# Patient Record
Sex: FEMALE | Race: WHITE
[De-identification: names, ages, dates, MRNs, and addresses within clinical notes are randomized per-mention and may not be internally consistent; named-entity substitution may affect disease eponyms.]

---

## 2017-10-12 ENCOUNTER — HOSPITAL ENCOUNTER (OUTPATIENT)
Dept: HOSPITAL 22 - LAB | Age: 22
End: 2017-10-12
Attending: NURSE PRACTITIONER
Payer: MEDICAID

## 2017-10-12 DIAGNOSIS — R53.83: Primary | ICD-10-CM

## 2017-10-12 DIAGNOSIS — E55.9: ICD-10-CM

## 2017-10-12 LAB
BASOPHILS # BLD AUTO: 1.9 K/MM3 (ref 0.7–4.5)
BUN: 15 MG/DL (ref 7–18)
EOSINOPHIL NFR BLD AUTO: 29.4 % (ref 10–50)
GFR SERPLBLD BASED ON 1.73 SQ M-ARVRAT: 91 ML/MIN (ref 59–?)
HCT VFR BLD CALC: 14.4 G/DL (ref 12.2–16.2)

## 2017-11-19 ENCOUNTER — HOSPITAL ENCOUNTER (EMERGENCY)
Dept: HOSPITAL 22 - UTC | Age: 22
Discharge: HOME | End: 2017-11-19
Payer: MEDICAID

## 2017-11-19 VITALS — HEIGHT: 62 IN | WEIGHT: 160 LBS | BODY MASS INDEX: 29.44 KG/M2

## 2017-11-19 VITALS — SYSTOLIC BLOOD PRESSURE: 131 MMHG | DIASTOLIC BLOOD PRESSURE: 96 MMHG

## 2017-11-19 DIAGNOSIS — J02.9: Primary | ICD-10-CM

## 2017-11-19 NOTE — EXTERNAL MEDICAL SUMMARY RPT
[facility]
 Created on: 2017
 
TROY JAMMIE
External Reference #: [id number]
: 89
Sex: Female
 
Demographics
 
 
 
 Address  [address]  [address]
 
 Home Phone  [phone]
 
 Preferred Language  English
 
 Marital Status  Unknown
 
 Christianity Affiliation  Unknown
 
 Race  Unknown
 
 Ethnic Group  Unknown
 
 
Author
 
 
 
 Author            ,            [facility]
 
 Organization  [facility]
 
 Address  Unknown
 
 Phone  [email or web address]
 
 
 
Care Team Providers
 
 
 
 Care Team Member Name  Role  Phone
 
 YAHIR CARDOZA, YAHIR CARDOZA   Unavailable  Unavailable
 
 LAZARO EMILY, LAZARO   Unavailable  Unavailable
 
 EMILY   
 
 UMESH MILIND,   Unavailable  Unavailable
 
 UMESH MILIND   
 
 MARK VISION,   Unavailable  Unavailable
 
 [facility] V[facility]   
 
 [facility]   Un[facility]  Unavailable
 
 [place]   
 
 [facility] [place]  
 
    
 
 [facility]   Unavailable  Unavailable
 
 [place]  
 
  [facility] [place]  
 
    
 
 ENEDELIA HANNAH,   Unavailable  Unavailable
 
 ENEDELIA HANNAH   
 
 [facility]   Unavailable  Unavailable
 
 [place], [facility]   Unavailable  Unavailable
 
 CENTER, [facility]   Unavailable  Unavailable
 
 INC, [facility]   
 
 [facility]   Unavailable  Unavailable
 
 HOSPITAL, [facility],   Unavailable  Unavailable
 
 [facility]   
 
 [facility],  Unavailable  Unavailable
 
  [facility]  
 
    
 
 [facility]   Unavailable  Unavailable
 
 IM[facility]ING [facility], [facility]   
 
 DYLLAN SOM,   Unavailable  Unavailable
 
 DYLLAN SOM   
 
 DYLLAN, SOM B,   Unavailable  Unavailable
 
 DYLLAN, SOM B   
 
 MUSIC RONDA, MUS[facility] RONDA   Unavailable  Unavailable
 
 [facility],   Unavailable  Unavailable
 
 [facility]   Unavailable  Unavailable
 
 [facility]   
 
 [facility]   
 
 DILLAN MEAD,   Unavailable  Unavailable
 
 [facility],   Unavailable  Unavailable
 
 [facility]   
 
 [facility]   Unavailable  Unavailable
 
 [address]   
 
 [facility] [address]   
 
 [facility]   Unavailable  Unavailable
 
 [facility], [facility]   
 
 [facility] ARNAV WANG III MESERET,   Unavailable  Unavailable
 
 KATHLEEN III MESERET   
 
                                            
 
Purpose
                      Continuity of Care Document - 2008 through 2017                                                                            
                     
 
Problems
                      
 
 
 Code         Diagnosis    DOS          Provider     Status     
 
                                                               
 
               
 
 J00          ACUTE   2017   [facility]              
 
              NASOPHARYNG               PHYSICIAN              
 
      ITIS COMMON          GROUP                   
 
   COLD                       
 
                  
 
      
 
         CARPAL   2017   [facility]              
 
              TUNNEL                PHYSICIANS              
 
      SYNDROME           GROUP                   
 
  BILATERAL                  
 
  UPPER LIMBS     
 
                
 
            
 
         LESION OF   2017   [facility]              
 
              ULNAR NERVE               PHYSICIANS              
 
       BILATERAL           GROUP                   
 
  UPPER LIMBS                 
 
                  
 
            
 
         ENCOUNTER   2017   [facility]              
 
              FOR                PHYSICIANS              
 
      SURVEILLANC          GROUP                   
 
  E                  
 
  CONTRACEPTI     
 
  VES UNS       
 
                
 
        
 
 J209         ACUTE   2017   [facility]              
 
              BRONCHITIS                PHYSICIANS,             
 
      UNSPECIFIED           PLL                   
 
                              
 
              
 
 R05          COUGH        2017   [facility]              
 
                                        PHYSICIANS,             
 
                  [facility]                   
 
                  
 
      
 
 R509         FEVER   2017   [place]              
 
              UNSPECIFIED               MEDICAL              
 
                           IMAGING ASS             
 
                          
 
            
 
 J028         ACUTE   2017   [facility]              
 
              PHARYNGITIS               PHYSICIANS              
 
       DUE TO           GROUP                   
 
  OTHER SPEC                  
 
  ORGANISMS       
 
                
 
          
 
 Z111         ENCOUNTER   2017   [facility]              
 
              SCREENING                PHYSICIAN              
 
      FOR           GROUP                   
 
  RESPIRATORY                 
 
        
 
  TUBERCULOSI   
 
  S             
 
             
 
 J029         ACUTE   2017   [facility]              
 
              [facility]               [facility]              
 
                 INC                     
 
  UNSPECIFIED                
 
                
 
            
 
 J020         STREPTOCOCC  2017   [facility]              
 
              AL                [facility]              
 
      PHARYNGITIS          INC                     
 
                             
 
            
 
 J069         ACUTE UPPER  2016   [facility]              
 
                              PHYSICIANS              
 
      RESPIRATORY          GROUP                   
 
   INFECTION                  
 
  UNSPECIFIED     
 
                
 
            
 
 [id number]       CELLULITIS   2016   [facility]              
 
              OF RIGHT                PHYSICIANS,             
 
      LOWER LIMB            PLLC                   
 
                              
 
             
 
         ENCOUNTER   2016   [facility]              
 
              SURVEILLANC               PHYSICIANS              
 
      E           GROUP                   
 
  INJECTABLE                  
 
  CONTRACEPTI     
 
  VE            
 
              
 
 [id number]       ACUTE   2016   [facility]              
 
              SUPPURATIVE               PHYSICIANS              
 
       OM W/O           GROUP                   
 
  RUPT EAR                  
 
  DRUM UNS      
 
  EAR           
 
               
 
 R102         PELVIC AND   2016   [facility]              
 
              PERINEAL                PHYSICIANS              
 
      PAIN                 GROUP                   
 
                              
 
      
 
         LEFT LOWER   2016   [facility]              
 
              QUADRANT                PHYSICIANS              
 
      PAIN                 GROUP                   
 
                              
 
      
 
         ENCOUNTER   2015   [facility]              
 
              FOR                PHYSICIANS              
 
      SURVEILLANC          GROUP                   
 
  E OTHER                  
 
  CONTRACEPTI     
 
  VES           
 
               
 
         HIGH RISK   2015   [place]              
 
              HETEROSEXUA               [facility]              
 
      L BEHAVIOR           INC                     
 
                             
 
           
 
 7804         DIZZINESS   2015   [facility]              
 
              AND                PHYSICIANS              
 
      GIDDINESS            GROUP                   
 
                              
 
            
 
 7850         UNSPECIFIED  2015   [facility]              
 
                              PHYSICIANS              
 
      TACHYCARDIA          GROUP                   
 
                              
 
              
 
 36198        CHEST PAIN   2015   [facility]              
 
              UNSPECIFIED               PHYSICIANS              
 
                           GROUP                   
 
                          
 
      
 
 81197        ACUTE   2015   [place]              
 
              SANGUINOUS                MEMORIAL              
 
      OTITIS           [facility]                
 
  MEDIA                       
 
                     
 
      
 
 5990         URINARY   2015   [facility]              
 
              TRACT                PHYSICIANS              
 
      INFECTION           GROUP                   
 
  SITE NOT                  
 
  SPECIFIED       
 
                
 
          
 
 6268         OTH D/O   2015   [facility]              
 
              MENSTRUATIO               PHYSICIANS              
 
      N&OTH ABN           GROUP                   
 
  BLEED FE                  
 
  GNT TRACT       
 
                
 
          
 
 20444        INFLUENZA   2015   [place]              
 
              IDENT [facility]                
 
  A RESP                  
 
  MANIFEST           
 
                
 
         
 
 4660         ACUTE   2015   [facility]              
 
              BRONCHITIS                PHYSICIANS              
 
                           GROUP                   
 
                         
 
      
 
 52674        ABDOMINAL   2014   [facility]              
 
              PAIN RIGHT                PHYSICIANS              
 
      LOWER           GROUP                   
 
  QUADRANT                    
 
                  
 
         
 
 V741         SCREENING   10-   [facility]              
 
              EXAMINATION               PHYSICIANS              
 
       FOR           GROUP                   
 
  PULMONARY                  
 
  TUBERCULOSI     
 
  S             
 
             
 
 23852        SHORTNESS   2014   [facility]              
 
              OF BREATH                 PHYSICIANS              
 
                           GROUP                   
 
                        
 
      
 
 5368         DYSPEPSIA&O  2014   [facility]              
 
              THER SPEC                [facility] DEPT              
 
    DISORDERS           [facility]                 
 
  FUNCTION                  
 
  STOMACH           
 
                
 
        
 
 09586        NAUSEA   2014   [facility]              
 
              ALONE                     [facility] DE[facility]              
 
                         [place]                 
 
                    
 
        
 
 63219        NAUSEA WITH  2014   [facility]              
 
               VOMITING                 [facility]              
 
                         [place]                 
 
                        
 
        
 
 61308        PAIN IN   2014   [facility]              
 
              JOINT, SITE               TH DEPT              
 
               [place]                 
 
  UNSPECIFIED                 
 
                    
 
            
 
 6253         DYSMENORRHE  2014   [facility]              
 
              A                         [facility] DE[facility]              
 
                      [place]                 
 
                  
 
        
 
 7840         HEADACHE     2014   [facility]              
 
                                        TH DEPT              
 
                  [place]                 
 
                  
 
        
 
 64461        FEVER   2014   [facility]              
 
              UNSPECIFIED               [facility] DEPT              
 
                         [place]                 
 
                          
 
        
 
 462          ACUTE   2014   [facility]              
 
              PHARYNGITIS               PHYSICIANS              
 
                           GROUP                   
 
                          
 
      
 
 46297        FEVER   2014   [facility]              
 
              PRESENTING                PHYSICIANS              
 
      CONDITIONS           GROUP                   
 
  CLASSIFIED                  
 
  ELSEWHERE       
 
                
 
          
 
         SURVEILLANC  2013   [facility]             
 
              E OT PREV                 HEALTH              
 
    PRSC           CENTER                  
 
  CONTRACEPT                  
 
  METHOD           
 
                
 
       
 
         OTHER   2013   [facility]             
 
              [facility]                 HEALTH              
 
    PROCREATIVE          CENTER                  
 
   MANAGEMENT                 
 
                   
 
            
 
 26645        DIARRHEA     2013   [facility]AH[facility] SONY               
 
                                                                
 
                                    
 
      
 
 V700         ROUTINE   2013   [facility]              
 
              GENERAL                [facility]                     
 
    MEDICAL                                  
 
  EXAM@[facility]     
 
   CARE FACL    
 
                
 
           
 
 1110         PITYRIASIS   2013   MUS[facility] RONDA               
 
              VERSICOLOR                                        
 
                                           
 
             
 
 V255         INSERTION   2012   [facility]              
 
              OF                                        
 
    IMPLANTABLE                             
 
   SUBDERMAL      
 
  CONTRACEPTI   
 
  VE            
 
              
 
 2662         OTHER   2012   [facility]             
 
              [facility]                 HEALTH              
 
    DEFICIENCIE          CENTER                  
 
  S                           
 
                
 
 17345        UNSPECIFIED  2012   [facility]             
 
               VIRAL                 [facility]                    
 
    INFECTION                                   
 
  IN CCE &      
 
  UNS SITE      
 
                
 
         
 
 490          BRONCHITIS   2011   [facility]             
 
              NOT                 [facility]                    
 
    SPECIFIED                                   
 
  AS ACUTE OR     
 
   CHRONIC      
 
                
 
         
 
 49741        ASTHMA   2011   [facility]             
 
              UNSPECIFIED                [facility]                    
 
     WITH                                   
 
  EXACERBATIO     
 
  N             
 
             
 
 09982        ACUTE   2011   [facility]             
 
              BRONCHOSPAS                [facility]                    
 
    M                                            
 
               
 
 67850        OTHER   2011   [facility]              
 
              DYSPNEA AND               MIL[facility]                     
 
                                      
 
  RESPIRATORY     
 
      
 
  ABNORMALITI   
 
  ES            
 
              
 
 4659         ACUTE URIS   10-   [facility]              
 
              OF                DON                     
 
    UNSPECIFIED                                 
 
   SITE           
 
                
 
      
 
 10223        OTHER   2011   [facility]              
 
              SPECIFIED                DON                     
 
    DISORDERS                                  
 
  OF URINARY      
 
  TRACT         
 
                
 
      
 
 5589         OTH&UNSPEC   2011   [facility]              
 
              NONINFECTIO               DON                     
 
    US                                  
 
  GASTROENTER     
 
  ITIS&COLITI   
 
  S             
 
             
 
         OT GENERAL  2011   [facility]             
 
                               HEALTH              
 
    CNSL&ADVICE          CENTER                  
 
   CONTRACEPT                 
 
   MANAGEMENT      
 
                
 
            
 
 3670         HYPERMETROP  2011   [facility]              
 
              IA                        VISION                  
 
                                               
 
         
 
 4778         ALLERGIC   2011   [facility]              
 
              RHINITIS                SOM                     
 
    DUE TO                                  
 
  OTHER      
 
  ALLERGEN      
 
                
 
         
 
 7862         COUGH        2011   [facility]              
 
                                        SOM                     
 
                                      
 
      
 
 70550        EXTRINSIC   2011   [facility]              
 
              ASTHMA,                SOM                     
 
    WITH                                  
 
  EXACERBATIO     
 
  N             
 
             
 
         GENERAL   10-   [facility]             
 
              CNSL                 HEALTH              
 
    INITIATION           CENTER                  
 
  OTH                  
 
  CONTRACEPT       
 
  MEASURES      
 
                
 
         
 
         PREGNANCY   10-   [facility]             
 
              EXAMINATION                HEALTH              
 
     OR TEST           CENTER                  
 
  NEGATIVE                  
 
  RESULT           
 
                
 
       
 
 7881         DYSURIA      2010   [facility]              
 
                                        DON                     
 
                                        
 
      
 
 3829         UNSPECIFIED  2010   [facility],              
 
               OTITIS                DON R                   
 
    MEDIA                                        
 
                    
 
      
 
 70637        OTHER   2010   [facility],              
 
              CHRONIC                SOM B                   
 
    ALLERGIC                                   
 
  CONJUNCTIVI       
 
  TIS           
 
               
 
 22637        ESOPHAGEAL   2010   [facility],              
 
              REFLUX                    SOM B                   
 
                                                 
 
           
 
 4779         ALLERGIC   2009   [facility],              
 
              RHINITIS                DON R                   
 
    CAUSE                                   
 
  UNSPECIFIED       
 
                
 
            
 
 6262         EXCESSIVE   2008   [facility],              
 
              OR FREQUENT               DON R                   
 
                                       
 
  MENSTRUATIO       
 
  N             
 
             
 
 7831         ABNORMAL   2008   [place]              
 
              WEIGHT GAIN               [facility]              
 
                         INC                     
 
                         
 
 V069         NEED PROPH   2008   [facility]              
 
              VACCINATION               HEALTH              
 
     W/UNSPEC           CENTRAL              
 
  COMB    BANK ACCT     
 
  VACCINE                   
 
                      
 
        
 
 0340         STREPTOCOCC  2008   [facility]             
 
              AL [facility]             
 
    THROAT                                       
 
                          
 
       
 
 7821         RASH AND   2008   [facility],              
 
              OTHER                DON R                   
 
    NONSPECIFIC                                  
 
   SKIN        
 
  ERUPTION      
 
                
 
         
 
 6828         CELLULITIS   2008   [facility],              
 
              AND ABSCESS               DON R                   
 
     OF OTHER                                   
 
  SPECIFIED        
 
  SITE          
 
                
 
 J20.9        ACUTE                                        
 
              BRONCHITIS,                                       
 
                                            
 
  UNSPECIFIED             
 
                
 
            
 
                                                                                
                                                                                
                                                                                
                                                                                
                                                                                
                                                                                
                                                                                
                                                                                
                                                                                
                                                                                
        
 
Medications
                      
 
 
 Na  ND  Rx  Da  Fi  Fi  Am  Da  Di  Ph  RX  Ph  St
 
 me  C   No  te  ll  ll  ou  ys  ag  ar   #  ys  at
 
         rm        s   nt      no  ma      ic  us
 
             Or  Da              si  cy      ia    
 
             de  te              s           n     
 
             re                                    
 
             d                                     
 
                                                   
 
                                                   
 
                                                   
 
                                                  
 
                                   
 
                           
 
                      
 
               
 
              
 
 VI  65      10  11      4.  28          00  [place]  Ac
 
 T   38      -1  -1      00              00  L-  ti
 
 D2  00      2-  7-      0               07  MA  ve
 
    73      20  20                      51  RT    
 
 1.  70      17  17                      50       
 
 25  6                                   83  PH    
 
                                            AR    
 
 MG                                          MA    
 
                                            CY    
 
 (5                                              
 
 0,                                    #5    
 
 00                                   91 
 
 0                                     
 
 UN                                    
 
 IT                             
 
 )                         
 
                           
 
                           
 
                  
 
                  
 
                  
 
               
 
               
 
               
 
              
 
 SV  81      10  11      30  30          00  [place]  Ac
 
    13      -1  -1      .0              00  L-  ti
 
 VI  10      2-  7-      00              08  MA  ve
 
 TA  31      20  20                      84  RT    
 
 MI  27      17  17                      15       
 
 N   1                                   82  PH    
 
 D3                                          AR    
 
                                            MA    
 
 5,                                          CY    
 
 00                                              
 
 0                                     #5    
 
 UN                                    91 
 
 IT                                    
 
                                      
 
 SF                             
 
 TG                        
 
 L                         
 
                           
 
                  
 
                  
 
                  
 
               
 
               
 
               
 
               
 
              
 
 NA  65      10  11      60  30          00  [place]  Ac
 
 ME  16      -1  -1      .0              00  L-  ti
 
 OX  20      2  7-      00              07  MA  ve
 
 EN  19      20  20                      51  RT    
 
    01      17  17                      50       
 
 50  1                                   84  PH    
 
 0                                           AR    
 
 MG                                          MA    
 
                                            CY    
 
 TA                                              
 
 BL                                    #5    
 
 ET                                    91 
 
                                       
 
                                       
 
                                
 
                           
 
                           
 
                           
 
                  
 
                  
 
                  
 
 [place]  60      10  11      5.  7           00  [place]  Ac
 
 LF  75      -1  -1      00              00  L-  ti
 
 AC  80      6-  7-      0               07  MA  ve
 
 ET  01      20  20                      51  RT    
 
 AM  80      17  17                      56       
 
 ID  5                                   63  PH    
 
 E                                           AR    
 
 10                                          MA    
 
 %                                           CY    
 
 EY                                             
 
 E                                     #5    
 
 DR                                   91 
 
 OP                                    
 
 S                                     
 
                                
 
                           
 
                           
 
                           
 
                  
 
                  
 
                  
 
               
 
              
 
 AZ  50      10  11      6.  5           00  [place]  Ac
 
 IT  11      -1  -1      00              00  L-  ti
 
 HR  10      6-  7-      0               07  MA  ve
 
 OM  78      20  20                      51  RT    
 
 YC  75      17  17                      56       
 
 IN  1                                   62  PH    
 
                                            AR    
 
 25                                          MA    
 
 0                                           CY    
 
 MG                                             
 
                                      #5    
 
 TA                                   91 
 
 BL                                    
 
 ET                                    
 
                                
 
                           
 
                           
 
                           
 
                  
 
                  
 
                  
 
               
 
               
 
 ME  00      09  11      20  10          00  [place]  Ac
 
 OM  60      -2  -0      0.              00  L-  ti
 
 ET  31      9-  3-      00              07  MA  ve
 
 HA  58      20  20      0               51  RT    
 
 ZI  65      17  17                      26       
 
 NE  8                                   76  PH    
 
 -D                                          AR    
 
 M                                           MA    
 
 SY                                          CY    
 
 RU                                              
 
 P                                     #5    
 
                                       91 
 
                                         
 
                                       
 
                                
 
                           
 
                           
 
                           
 
                  
 
                 
 
               
 
 LO  00      09  11      30  30          00  [place]  Ac
 
 RA  78      -2  -0      .0              00  L-  ti
 
 TA  15      9-  3-      00              08  MA  ve
 
 DI  07      20  20                      84  RT    
 
 NE  70      17  17                      13       
 
    1                                   82  PH    
 
 10                                          AR    
 
                                            MA    
 
 MG                                          CY    
 
                                                
 
 TA                                    #5    
 
 BL                                    91 
 
 ET                                    
 
                                       
 
                                
 
                           
 
                           
 
                           
 
                  
 
                  
 
                  
 
               
 
 ME  59      08  09      1.  90          00  CL  Ac
 
 DR  76      -1  -2      00              00  IN  ti
 
 OX  24      8-  2-      0               00  IC  ve
 
 YP  53      20  20                      40       
 
 RO  80      17  17                      75  PH    
 
 GE  2                                   72  AR    
 
 ST                                          MA    
 
 ER                                          CY    
 
 ON                                                
 
 E                                                
 
 15                                          
 
 0                                       
 
 MG                                    
 
 /M                                    
 
 L                              
 
                           
 
                           
 
                        
 
               
 
               
 
               
 
               
 
               
 
              
 
 BE  68      08  09      15  5           00  [place]  Ac
 
 NZ  38      -0  -0      .0              00  L-  ti
 
 ON  20      3-  8-      00              07  MA  ve
 
 AT  24      20  20                      50  RT    
 
 AT  70      17  17                      20       
 
 E   1                                   58  PH    
 
 10                                          AR    
 
 0                                           MA    
 
 MG                                          CY    
 
                                               
 
 CA                                    #5    
 
 PS                                    91 
 
 UL                                    
 
 E                                     
 
                                
 
                           
 
                           
 
                           
 
                  
 
                  
 
                  
 
               
 
              
 
 AZ  59      08  09      4.  4           00  [place]  Ac
 
 IT  76      -0  -0      00              00  L-  ti
 
 HR  23      3-  8-      0               07  MA  ve
 
 OM  06      20  20                      50  RT    
 
 YC  00      17  17                      20       
 
 IN  2                                   59  PH    
 
                                            AR    
 
 25                                          MA    
 
 0                                           CY    
 
 MG                                             
 
                                      #5    
 
 TA                                   91 
 
 BL                                    
 
 ET                                    
 
                                
 
                           
 
                           
 
                           
 
                  
 
                  
 
                  
 
               
 
               
 
 BR  60      07  08      40  7           00  [place]  Ac
 
 OM  43      -2  -2      0.              00  L-  ti
 
 PH  20      6-  5-      00              07  MA  ve
 
 EN  27      20  20      0               50  RT    
 
 IR  51      17  17                      08       
 
 -P  6                                   19  PH    
 
 SE                                          AR    
 
 UD                                          MA    
 
 OE                                          CY    
 
 PH                                             
 
 ED                                    #5    
 
 -D                                    91 
 
 M                                       
 
 SY                                    
 
 R                              
 
                           
 
                           
 
                           
 
                  
 
                  
 
                  
 
               
 
               
 
              
 
 NA  65      07  08      28  14          00  [place]  Ac
 
 ME  16      -1  -1      .0              00  L-  ti
 
 OX  20      8-  8-      00              07  MA  ve
 
 EN  19      20  20                      49  RT    
 
    01      17  17                      93       
 
 50  1                                   43  PH    
 
 0                                           AR    
 
 MG                                          MA    
 
                                            CY    
 
 TA                                              
 
 BL                                    #5    
 
 ET                                    91 
 
                                       
 
                                       
 
                                
 
                           
 
                           
 
                           
 
                  
 
                  
 
                  
 
 ME  59      05  06      1.  90          00  CL  
 
 DR  76      -2  -2      00              00  IN  ti
 
 OX  24      4-  3-      0               00  IC  ve
 
 YP  53      20  20                      40       
 
 RO  80      17  17                      75  PH    
 
 GE  2                                   72  AR    
 
 ST                                          MA    
 
 ER                                          CY    
 
 ON                                                
 
 E                                                
 
 15                                          
 
 0                                       
 
 MG                                    
 
 /M                                    
 
 L                              
 
                           
 
                           
 
                        
 
               
 
               
 
               
 
               
 
               
 
              
 
 BR  60      05  06      15  3           00  [facility]
 
 OM  43      -1  -1      0.              00  L-  ti
 
 PH  20      1-  6-      00              07  MA  ve
 
 EN  27      20  20      0               48  RT    
 
 IR  51      17  17                      74       
 
 -P  6                                   48  PH    
 
 SE                                          AR    
 
 UD                                          MA    
 
 OE                                          CY    
 
 PH                                             
 
 ED                                    #5    
 
 -D                                    91 
 
 M                                       
 
 SY                                    
 
 R                              
 
                           
 
                           
 
                           
 
                  
 
                  
 
                  
 
               
 
               
 
              
 
 VE  00      05  06      18  17          00  [facility]
 
 NT  17      -1  -1      .0              00  L-  ti
 
 OL  30      1-  6-      00              07  MA  ve
 
 IN  68      20  20                      48  RT    
 
    22      17  17                      74       
 
 HF  0                                   50  PH    
 
 A                                           AR    
 
 90                                          MA    
 
                                            CY    
 
 MC                                              
 
 G                                     #5    
 
 IN                                    91 
 
 [place]                                    
 
 LE                                    
 
 R                              
 
                           
 
                           
 
                           
 
                  
 
                  
 
                  
 
               
 
               
 
              
 
 CE  68      05  06      20  10          00  [facility]
 
 FD  18      -1  -1      .0              00  L-  ti
 
 IN  00      2-  6-      00              07  MA  ve
 
 IR  71      20  20                      48  RT    
 
    16      17  17                      76       
 
 30  0                                   54  PH    
 
 0                                           AR    
 
 MG                                          MA    
 
                                            CY    
 
 CA                                              
 
 PS                                    #5    
 
 UL                                    91 
 
 E                                     
 
                                       
 
                                
 
                           
 
                           
 
                           
 
                  
 
                  
 
                  
 
              
 
 AZ  59      05  06      6.  5           00  [facility]
 
 IT  76      -1  -1      00              00  L-  ti
 
 HR  23      6-  6-      0               07  MA  ve
 
 OM  06      20  20                      48  RT    
 
 YC  00      17  17                      81       
 
 IN  1                                   07  PH    
 
                                            AR    
 
 25                                          MA    
 
 0                                           CY    
 
 MG                                             
 
                                      #5    
 
 TA                                   91 
 
 BL                                    
 
 ET                                    
 
                                
 
                           
 
                           
 
                           
 
                  
 
                  
 
                  
 
               
 
               
 
 BE  68      05  06      15  5           00  [facility]
 
 NZ  38      -1  -1      .0              00  L-  ti
 
 ON  20      6-  6-      00              07  MA  ve
 
 AT  24      20  20                      48  RT    
 
 AT  70      17  17                      81       
 
 E   1                                   08  PH    
 
 10                                          AR    
 
 0                                           MA    
 
 MG                                          CY    
 
                                               
 
 CA                                    #5    
 
 PS                                    91 
 
 UL                                    
 
 E                                     
 
                                
 
                           
 
                           
 
                           
 
                  
 
                  
 
                  
 
               
 
              
 
 ME  59      03  03      1.  90          00  CL  
 
 DR  76      -0  -3      00              00  IN  ti
 
 OX  24      1-  1-      0               00  IC  ve
 
 YP  53      20  20                      40       
 
 RO  80      17  17                      75  PH    
 
 GE  2                                   72  AR    
 
 ST                                          MA    
 
 ER                                          CY    
 
 ON                                                
 
 E                                                
 
 15                                          
 
 0                                       
 
 MG                                    
 
 /M                                    
 
 L                              
 
                           
 
                           
 
                        
 
               
 
               
 
               
 
               
 
               
 
              
 
 BR  60      12  01      20  4           00  [facility]
 
 OM  43      -1  -1      0.              00  L-  ti
 
 PH  20      4-  3-      00              07  MA  ve
 
 EN  27      20  20      0               45  RT    
 
 IR  51      16  17                      85       
 
 -P  6                                   18  PH    
 
 SE                                          AR    
 
 UD                                          MA    
 
 OE                                          CY    
 
 PH                                             
 
 ED                                    #5    
 
 -D                                    91 
 
 M                                       
 
 SY                                    
 
 R                              
 
                           
 
                           
 
                           
 
                  
 
                  
 
                  
 
               
 
               
 
              
 
 ME  59      12  01      1.  90          00  CL  Ac
 
 DR  76      -0  -0      00              00  IN  ti
 
 OX  24      7-  9-      0               00  IC  ve
 
 YP  53      20  20                      40       
 
 RO  80      16  17                      75  PH    
 
 GE  2                                   72  AR    
 
 ST                                          MA    
 
 ER                                          CY    
 
 ON                                                
 
 E                                                
 
 15                                          
 
 0                                       
 
 MG                                    
 
 /M                                    
 
 L                              
 
                           
 
                           
 
                        
 
               
 
               
 
               
 
               
 
               
 
              
 
 FL  00      10  10  1   16  30      EA  24  ST  Ac
 
 UT  05      -1  -1      .0          ST  57  EP  ti
 
 IC  43      9-  9-      00          SI  37  HE  ve
 
 AS  27      20  20                  DE      NS    
 
 ON  09      11  11                              
 
 E   9                               PH      DO    
 
 ME                                  AR      N     
 
 OP                                  MA      R     
 
                                    CY            
 
 50                                             
 
                           OF            
 
 MC                                   
 
 G                       CY      
 
 SP                      NT      
 
 RA               HI      
 
 Y              AN      
 
                A      
 
                     
 
                  
 
                  
 
                  
 
                  
 
                  
 
                  
 
                 
 
              
 
 BR  60      10  10  1   12  3       EA  24  ST  Ac
 
 OM  43      -1  -1      0.          ST  57  EP  ti
 
 FE  20      9-  9-      00          SI  38  HE  ve
 
 D   83      20  20      0           DE      NS    
 
 DM  71      11  11                              
 
    6                               PH      DO    
 
 CO                                  AR      N     
 
 UG                                  MA      R     
 
 H                                   CY            
 
 SY                                            
 
 RU                         OF            
 
 P                                    
 
                           CY      
 
                         NT      
 
                  HI      
 
                AN      
 
                A      
 
                     
 
                  
 
                  
 
                 
 
               
 
               
 
               
 
               
 
              
 
 ME  00      06  06  0   16  3       EA  23  ST  Ac
 
 OM  78      -2  -2      .0          ST  09  EP  ti
 
 ET  11      7-  7      00          SI  79  HE  ve
 
 HA  83      20  20                  DE      NS    
 
 ZI  01      11  11                              
 
 NE  0                               PH      DO    
 
                                    AR      N     
 
 25                                  MA      R     
 
                                    CY            
 
 MG                                            
 
                           OF            
 
 TA                                   
 
 BL                      CY      
 
 ET                      NT      
 
                  HI      
 
                AN      
 
                A      
 
                     
 
                  
 
                  
 
                  
 
                  
 
                  
 
               
 
               
 
              
 
 [place]  50      06  06  0   12  6       EA  23  ST  Ac
 
 LF  38      -2  -2      0.          ST  09  EP  ti
 
 AM  30      7-  7-      00          SI  80  HE  ve
 
 ET  82      20  20      0           DE      NS    
 
 HO  31      11  11                              
 
 XA  6                               PH      DO    
 
 ZO                                  AR      N     
 
 LE                                  MA      R     
 
 -T                                  CY            
 
 MP                                            
 
                           OF            
 
 [place]                                   
 
 SP                        CY      
 
                         NT      
 
                  HI      
 
                AN      
 
                A      
 
                     
 
                  
 
                  
 
                  
 
                  
 
               
 
               
 
               
 
              
 
 AM  00      05  05  0   30  10      EA  22  ST  Ac
 
 OX  78      -2  -2      .0          ST  65  EP  ti
 
 IC  12      4-  4-      00          SI  59  HE  ve
 
 IL  02      20  20                  DE      NS    
 
 LI  00      11  11                              
 
 N   5                               PH      DO    
 
 25                                  AR      N     
 
 0                                   MA      R     
 
 MG                                  CY            
 
                                               
 
 CA                         OF            
 
 PS                                   
 
 UL                      CY      
 
 E                       NT      
 
                  HI      
 
                AN      
 
                A      
 
                     
 
                  
 
                  
 
                  
 
                  
 
                 
 
               
 
               
 
              
 
 ME  00      02  02  0   12  4       EA  21  ST  Ac
 
 OM  78      -2  -2      .0          ST  40  EP  ti
 
 ET  11      5-  5-      00          SI  90  HE  ve
 
 HA  83      20  20                  DE      NS    
 
 ZI  01      11  11                              
 
 NE  0                               PH      DO    
 
                                    AR      N     
 
 25                                  MA      R     
 
                                    CY            
 
 MG                                            
 
                           OF            
 
 TA                                   
 
 BL                      CY      
 
 ET                      NT      
 
                  HI      
 
                AN      
 
                A      
 
                     
 
                  
 
                  
 
                  
 
                  
 
                  
 
               
 
               
 
              
 
 LO  45      01  02  6   30  30      EA  20  MA  Ac
 
 RA  80      -1  -2      .0          ST  86  SH  ti
 
 TA  20      8-  4-      00          SI  08  BU  ve
 
 DI  65      20  20                  DE      RN    
 
 NE  08      11  11                              
 
    7                               PH      AM    
 
 10                                  AR      Y     
 
                                    MA      B     
 
 MG                                  CY            
 
                                               
 
 TA                         OF            
 
 BL                                   
 
 ET                      CY      
 
                         NT      
 
                  HI      
 
                AN      
 
                A      
 
                     
 
                  
 
                  
 
                  
 
                  
 
               
 
               
 
               
 
              
 
     00      01  02  6   30  30      EA  20  CO  Ac
 
     00        -2      .0          ST  86  MM  ti
 
     60      8-  4      00          SI  10  UN  ve
 
     11      20  20                  DE      IT    
 
     73      11  11                         Y     
 
     1                               PH      AL    
 
                                     AR      LE    
 
                                     MA      RG    
 
                                     CY      Y     
 
                                          &     
 
                            OF      AS    
 
                                   TH 
 
                         CY   MA 
 
                         NT     
 
                  HI   PS 
 
                AN   C  
 
                A       
 
                        
 
                  
 
                  
 
                  
 
                  
 
                  
 
                  
 
                 
 
              
 
     00      01  02  6   30  30      EA  20  MA  Ac
 
       -2      .0          ST  86  SH  ti
 
     60      8-  4      00          SI  10  BU  ve
 
     11      20  20                  DE      RN    
 
     73      11  11                              
 
     1                               PH      AM    
 
                                     AR      Y     
 
                                     MA      B     
 
                                     CY            
 
                                                
 
                            OF            
 
                                      
 
                         CY      
 
                         NT      
 
                  HI      
 
                AN      
 
                A      
 
                     
 
               
 
               
 
               
 
               
 
               
 
               
 
               
 
              
 
 AS  00      02  02  6   0.  15      EA  21  MA  Ac
 
 MA        24          ST  29  SH  ti
 
 NE  51      7-  7-      0           SI  45  BU  ve
 
 X   34      20  20                  DE      RN    
 
 TW  10      11  11                              
 
 IS  2                               PH      AM    
 
 TH                                  AR      Y     
 
 AL                                  MA      B     
 
 ER                                  CY            
 
                                               
 
 22                         OF            
 
 0                                   
 
 MC                      CY      
 
 G                       NT      
 
 #6               HI      
 
 0              AN      
 
                A      
 
                     
 
                  
 
                  
 
                  
 
                  
 
                  
 
                  
 
                 
 
              
 
 MA  51      02  02  0   59  1       EA  21  ST  Ac
 
 LA  67        -1      .0          ST  26  EP  ti
 
 TH  25      6          SI  49  HE  ve
 
 IO  27      20  20                  DE      NS    
 
 N   70      11  11                              
 
 0.  4                               PH      DO    
 
 5%                                  AR      N     
 
                                    MA      R     
 
 LO                                  CY            
 
 TI                                            
 
 ON                         OF            
 
                                      
 
                         CY      
 
                         NT      
 
                  HI      
 
                AN      
 
                A      
 
                     
 
                  
 
                  
 
               
 
               
 
               
 
               
 
               
 
              
 
 ME  37      01  01  6   30  30      EA  20  MA  Ac
 
 IL    -1      .0          ST  86  SH  ti
 
 OS  00                SI  05  BU  ve
 
 EC  45      20  20                  DE      RN    
 
    50      11  11                              
 
 OT  2                               PH      AM    
 
 C                                   AR      Y     
 
 20                                  MA      B     
 
 .6                                  CY            
 
                                               
 
 MG                         OF            
 
                                     
 
 TA                      CY      
 
 BL                      NT      
 
 ET               HI      
 
                AN      
 
                A      
 
                     
 
                  
 
                  
 
                  
 
                  
 
                  
 
                  
 
               
 
              
 
 [place]  00      01  01  3   30  4       EA  20  MA  Ac
 
 ST  16        -1      .0          ST  86  SH  ti
 
 AT  80      8  8          SI  06  BU  ve
 
 IN  00      20  20                  DE      RN    
 
    73      11  11                              
 
 10  0                               PH      AM    
 
 0,                                  AR      Y     
 
 00                                  MA      B     
 
 0                                   CY            
 
 UN                                            
 
 IT                         OF            
 
 S/                                   
 
 GM                      CY      
 
                        NT      
 
 OI               HI      
 
 NT             AN      
 
                A      
 
                     
 
                  
 
                  
 
                  
 
                  
 
                  
 
                  
 
                  
 
              
 
 NA  00      01  01  6   17  30      EA  20  MA  Ac
 
 SO  08      -  -1      .0          ST  86  SH  ti
 
 NE  51      8  8      00          SI  07  BU  ve
 
 X   28      20  20                  DE      RN    
 
 50  80      11  11                              
 
    1                               PH      AM    
 
 MC                                  AR      Y     
 
 G                                   MA      B     
 
 NA                                  CY            
 
 SA                                             
 
 L                          OF            
 
 SP                                   
 
 RA                      CY      
 
 Y                       NT      
 
                  HI      
 
                AN      
 
                A      
 
                     
 
                  
 
                  
 
                  
 
                  
 
                 
 
               
 
               
 
              
 
 LO  45      01  01  6   30  30      EA  20  MA  Ac
 
 RA  80      -1  -1      .0          ST  86  SH  ti
 
 TA  20      8-  8-      00          SI  08  BU  ve
 
 DI  65      20  20                  DE      RN    
 
 NE  08      11  11                              
 
    7                               PH      AM    
 
 10                                  AR      Y     
 
                                    MA      B     
 
 MG                                  CY            
 
                                               
 
 TA                         OF            
 
 BL                                   
 
 ET                      CY      
 
                         NT      
 
                  HI      
 
                AN      
 
                A      
 
                     
 
                  
 
                  
 
                  
 
                  
 
               
 
               
 
               
 
              
 
 VE  00      01  01  3   18  18      EA  20  MA  Ac
 
 NT  17      -1  -1      .0          ST  86  SH  ti
 
 OL  30      8-  8-      00          SI  09  BU  ve
 
 IN  68      20  20                  DE      RN    
 
    22      11  11                              
 
 HF  0                               PH      AM    
 
 A                                   AR      Y     
 
 90                                  MA      B     
 
                                    CY            
 
 MC                                             
 
 G                          OF            
 
 IN                                   
 
 HA                      CY      
 
 LE                      NT      
 
 R                HI      
 
                AN      
 
                A      
 
                     
 
                  
 
                  
 
                  
 
                  
 
                  
 
                 
 
               
 
              
 
     00      01  01  6   30  30      EA  20  CO  Ac
 
     00      -1  -1      .0          ST  86  MM  ti
 
     60      8-  8-      00          SI  10  UN  ve
 
     11      20  20                  DE      IT    
 
     73      11  11                         Y     
 
     1                               PH      AL    
 
                                     AR      LE    
 
                                     MA      RG    
 
                                     CY      Y     
 
                                          &     
 
                            OF      AS    
 
                                   TH 
 
                         CY   MA 
 
                         NT     
 
                  HI   PS 
 
                AN   C  
 
                A       
 
                        
 
                  
 
                  
 
                  
 
                  
 
                  
 
                  
 
                 
 
              
 
     00      01  01  6   30  30      EA  20  MA  Ac
 
     00      -1  -1      .0          ST  86  SH  ti
 
     60      8-  8-      00          SI  10  BU  ve
 
     11      20  20                  DE      RN    
 
     73      11  11                              
 
     1                               PH      AM    
 
                                     AR      Y     
 
                                     MA      B     
 
                                     CY            
 
                                                
 
                            OF            
 
                                      
 
                         CY      
 
                         NT      
 
                  HI      
 
                AN      
 
                A      
 
                     
 
               
 
               
 
               
 
               
 
               
 
               
 
               
 
              
 
 LO  45      11  11  0   30  30      EA  19  ST  Ac
 
 RA  80      -0  -0      .0          ST  89  EP  ti
 
 TA  20      9-  9-      00          SI  81  HE  ve
 
 DI  65      20  20                  DE      NS    
 
 NE  08      10  10                              
 
    7                               PH      DO    
 
 10                                  AR      N     
 
                                    MA      R     
 
 MG                                  CY            
 
                                               
 
 TA                         OF            
 
 BL                                   
 
 ET                      CY      
 
                         NT      
 
                  HI      
 
                AN      
 
                A      
 
                     
 
                  
 
                  
 
                  
 
                  
 
               
 
               
 
               
 
              
 
 AZ  00      11  11  0   6.  6       EA  19  ST  Ac
 
 IT  09      -0  -0      00          ST  89  EP  ti
 
 HR  37      9-  9-      0           SI  82  HE  ve
 
 OM  14      20  20                  DE      NS    
 
 YC  61      10  10                              
 
 IN  8                               PH      DO    
 
                                    AR      N     
 
 25                                  MA      R     
 
 0                                   CY            
 
 MG                                            
 
                           OF            
 
 TA                                  
 
 BL                      CY      
 
 ET                      NT      
 
                  HI      
 
                AN      
 
                A      
 
                     
 
                  
 
                  
 
                  
 
                  
 
                  
 
               
 
               
 
              
 
 [place]  53      09  09  0   14  7       EA  19  ST  Ac
 
 LF  74      -2  -2      .0          ST  29  EP  ti
 
 AM  60      5-  5-      00          SI  06  HE  ve
 
 ET  27      20  20                  DE      NS    
 
 HO  20      10  10                              
 
 XA  5                               PH      DO    
 
 ZO                                  AR      N     
 
 LE                                  MA      R     
 
 -T                                  CY            
 
 MP                                            
 
                           OF            
 
 DS                                   
 
                        CY      
 
 TA                      NT      
 
 BL               HI      
 
 ET             AN      
 
                A      
 
                     
 
                  
 
                  
 
                  
 
                  
 
                  
 
                  
 
                  
 
              
 
     00      09  09  0   6.  3       EA  19  RU  Ac
 
     59      -2  -2      00          ST  25  SH  ti
 
     10      2-  2-      0           SI  63     ve
 
     50      20  20                  DE      NE    
 
     20      10  10                         IL    
 
     1                               PH       C    
 
                                     AR            
 
                                     MA            
 
                                     CY            
 
                                               
 
                            OF            
 
                                     
 
                         CY      
 
                         NT      
 
                  HI      
 
                AN   
 
                A    
 
                     
 
               
 
               
 
               
 
               
 
               
 
               
 
               
 
              
 
 AZ  00      08  08  0   6.  6       EA  18  ST  Ac
 
 IT  09      -2  -2      00          ST  78  EP  ti
 
 HR  37      0-  0-      0           SI  86  HE  ve
 
 OM  14      20  20                  DE      NS    
 
 YC  61      10  10                              
 
 IN  8                               PH      DO    
 
                                    AR      N     
 
 25                                  MA      R     
 
 0                                   CY            
 
 MG                                            
 
                           OF            
 
 TA                                  
 
 BL                      CY      
 
 ET                      NT      
 
                  HI      
 
                AN      
 
                A      
 
                     
 
                  
 
                  
 
                  
 
                  
 
                  
 
               
 
               
 
              
 
 SE  45      03  07  3   12  15      EA  16  ST  Ac
 
 LE  80      -2  -2      0.          ST  91  EP  ti
 
 NI  20      4-  6-      00          SI  94  HE  ve
 
 UM  04      20  20      0           DE      NS    
 
    06      10  10                              
 
 [place]  4                               PH      DO    
 
 LF                                  AR      N     
 
 ID                                  MA      R     
 
 E                                   CY            
 
 2.                                             
 
 5%                         OF            
 
                                     
 
 LO                        CY      
 
 TI                      NT      
 
 ON               HI      
 
                AN      
 
                A      
 
                     
 
                  
 
                  
 
                  
 
                  
 
                  
 
                  
 
               
 
              
 
 NA  00      03  03  0   17  30      EA  16  MA  Ac
 
 SO  08      -2  -2      .0          ST  96  SH  ti
 
 NE  51      7-  7-      00          SI  78  BU  ve
 
 X   28      20  20                  DE      RN    
 
 50  80      10  10                              
 
    1                               PH      AM    
 
 MC                                  AR      Y     
 
 G                                   MA      B     
 
 NA                                  CY            
 
 SA                                             
 
 L                          OF            
 
 SP                                   
 
 RA                      CY      
 
 Y                       NT      
 
                  HI      
 
                AN      
 
                A      
 
                     
 
                  
 
                  
 
                  
 
                  
 
                 
 
               
 
               
 
              
 
 LO  45      03  03  0   30  30      EA  16  MA  Ac
 
 RA  80      -2  -2      .0          ST  96  SH  ti
 
 TA  20      7-  7-      00          SI  79  BU  ve
 
 DI  65      20  20                  DE      RN    
 
 NE  08      10  10                              
 
    7                               PH      AM    
 
 10                                  AR      Y     
 
                                    MA      B     
 
 MG                                  CY            
 
                                               
 
 TA                         OF            
 
 BL                                   
 
 ET                      CY      
 
                         NT      
 
                  HI      
 
                AN      
 
                A      
 
                     
 
                  
 
                  
 
                  
 
                  
 
               
 
               
 
               
 
              
 
     00      03  03  0   30  30      EA  16  CO  Ac
 
     00      -2  -2      .0          ST  96  MM  ti
 
     60      7-  7-      00          SI  80  UN  ve
 
     11      20  20                  DE      IT    
 
     73      10  10                         Y     
 
     1                               PH      AL    
 
                                     AR      LE    
 
                                     MA      RG    
 
                                     CY      Y     
 
                                          &     
 
                            OF      AS    
 
                                   TH 
 
                         CY   MA 
 
                         NT     
 
                  HI   PS 
 
                AN   C  
 
                A       
 
                        
 
                  
 
                  
 
                  
 
                  
 
                  
 
                  
 
                 
 
              
 
     00      03  03  0   30  30      EA  16  MA  Ac
 
     00      -2  -2      .0          ST  96  SH  ti
 
     60      7-  7-      00          SI  80  BU  ve
 
     11      20  20                  DE      RN    
 
     73      10  10                              
 
     1                               PH      AM    
 
                                     AR      Y     
 
                                     MA      B     
 
                                     CY            
 
                                                
 
                            OF            
 
                                      
 
                         CY      
 
                         NT      
 
                  HI      
 
                AN      
 
                A      
 
                     
 
               
 
               
 
               
 
               
 
               
 
               
 
               
 
              
 
 ME  00      03  03  0   21  6       EA  16  ST  Ac
 
 TH  78      -2  -2      .0          ST  91  EP  ti
 
 YL  15      4-  4-      00          SI  92  HE  ve
 
 ME  02      20  20                  DE      NS    
 
 ED  20      10  10                              
 
 NI  7                               PH      DO    
 
 SO                                  AR      N     
 
 LO                                  MA      R     
 
 NE                                  CY            
 
  4                                            
 
                           OF            
 
 MG                                   
 
                        CY      
 
 DO                      NT      
 
 SE               HI      
 
 PK             AN      
 
                A      
 
                     
 
                  
 
                  
 
                  
 
                  
 
                  
 
                  
 
                  
 
              
 
 AM  00      03  03  0   30  10      EA  16  ST  Ac
 
 OX  78      -2  -2      .0          ST  91  EP  ti
 
 IC  12      4-  4-      00          SI  93  HE  ve
 
 IL  02      20  20                  DE      NS    
 
 LI  00      10  10                              
 
 N   5                               PH      DO    
 
 25                                  AR      N     
 
 0                                   MA      R     
 
 MG                                  CY            
 
                                               
 
 CA                         OF            
 
 PS                                   
 
 UL                      CY      
 
 E                       NT      
 
                  HI      
 
                AN      
 
                A      
 
                     
 
                  
 
                  
 
                  
 
                  
 
                 
 
               
 
               
 
              
 
 SE  45      03  03  3   12  15      EA  16  ST  Ac
 
 LE  80      -2  -2      0.          ST  91  EP  ti
 
 NI  20      4-  4-      00          SI  94  HE  ve
 
 UM  04      20  20      0           DE      NS    
 
    06      10  10                              
 
 [place]  4                               PH      DO    
 
 LF                                  AR      N     
 
 ID                                  MA      R     
 
 E                                   CY            
 
 2.                                             
 
 5%                         OF            
 
                                     
 
 LO                        CY      
 
 TI                      NT      
 
 ON               HI      
 
                AN      
 
                A      
 
                     
 
                  
 
                  
 
                  
 
                  
 
                  
 
                  
 
               
 
              
 
 ME  68      02  02  00  12  2       WA  70  ST  Ac
 
 OM  38      -1  -2      .0          L-  58  EP  ti
 
 ET  20      4-  6-      00          MA  50  HE  ve
 
 HA  04      20  20                  RT  9   NS    
 
 ZI  10      10  10                              
 
 NE  1                               PH      DO    
 
                                    AR      N     
 
 25                                  MA      R     
 
                                    CY            
 
 MG                                             
 
                           #5            
 
 TA                         91         
 
 BL                                
 
 ET                              
 
                          
 
                        
 
                       
 
                     
 
                  
 
                  
 
                  
 
               
 
               
 
 AM  00      02  02  00  21  7       EA  16  ST  Ac
 
 OX  78      -1  -2      .0          ST  38  EP  ti
 
 IC  12      6-  6-      00          SI  63  HE  ve
 
 IL  61      20  20                  DE      NS    
 
 LI  30      10  10                              
 
 N   5                               PH      DO    
 
 50                                  AR      N     
 
 0                                   MA      R     
 
 MG                                  CY            
 
                                               
 
 CA                         OF            
 
 PS                         CY         
 
 UL                      NT      
 
 E                       HI      
 
                  AN      
 
                A       
 
                       
 
                     
 
                  
 
                  
 
                  
 
                  
 
                 
 
               
 
              
 
 LO  45      02  02  00  14  14      EA  16  ST  Ac
 
 RA  80      -1  -2      .0          ST  38  EP  ti
 
 TA  20      6-  6-      00          SI  64  HE  ve
 
 DI  65      20  20                  DE      NS    
 
 NE  08      10  10                              
 
    7                               PH      DO    
 
 10                                  AR      N     
 
                                    MA      R     
 
 MG                                  CY            
 
                                                
 
 TA                         OF            
 
 BL                         CY         
 
 ET                      NT      
 
                         HI      
 
                  AN      
 
                A       
 
                       
 
                     
 
                  
 
                  
 
                  
 
                  
 
               
 
               
 
              
 
 LO  45      01  12  03  30  30      EA  11  CO  Ac
 
 RA  80      -1  -0      .0          ST  08  MM  ti
 
 TA  20      3-  3-      00          SI  13  UN  ve
 
 DI  65      20  20                  DE      IT    
 
 NE  08      09  09                         Y     
 
    7                               PH      AL    
 
 10                                  AR      LE    
 
                                    MA      RG    
 
 MG                                  CY      Y     
 
                                          &     
 
 TA                         OF      AS    
 
 BL                         CY      TH 
 
 ET                      NT   MA 
 
                         HI     
 
                  AN   PS 
 
                A    C  
 
                        
 
                        
 
                     
 
                     
 
                     
 
                     
 
                  
 
                  
 
                
 
 NA  00      01  12  01  17  30      EA  11  CO  Ac
 
 SO  08      -1  -0      .0          ST  08  MM  ti
 
 NE  51      3-  3-      00          SI  11  UN  ve
 
 X   28      20  20                  DE      IT    
 
 50  80      09  09                         Y     
 
    1                               PH      AL    
 
                                   AR      LE    
 
 G                                   MA      RG    
 
 NA                                  CY      Y     
 
 SA                                        &     
 
 L                          OF      AS    
 
 SP                         CY      TH 
 
 RA                      NT   MA 
 
 Y                       HI     
 
                  AN   PS 
 
                A    C  
 
                        
 
                        
 
                     
 
                     
 
                     
 
                     
 
                    
 
                  
 
                
 
     00      01  12  04  30  30      EA  11  CO  Ac
 
     00      -1  -0      .0          ST  08  MM  ti
 
     60      3-  3-      00          SI  14  UN  ve
 
     11      20  20                  DE      IT    
 
     73      09  09                         Y     
 
     1                               PH      AL    
 
                                     AR      LE    
 
                                     MA      RG    
 
                                     CY      Y     
 
                                           &     
 
                            OF      AS    
 
                            CY      TH 
 
                         NT   MA 
 
                         HI     
 
                  AN   PS 
 
                A    C  
 
                        
 
                        
 
                  
 
                  
 
                  
 
                  
 
                  
 
                  
 
                
 
 LO  60      01  11  02  30  30      EA  11  CO  Ac
 
 RA  50      -1  -1      .0          ST  08  MM  ti
 
 TA  50      3-  9-      00          SI  13  UN  ve
 
 DI  14      20  20                  DE      IT    
 
 NE  70      09  09                         Y     
 
    8                               PH      AL    
 
 10                                  AR      LE    
 
                                    MA      RG    
 
 MG                                  CY      Y     
 
                                          &     
 
 TA                         OF      AS    
 
 BL                         CY      TH 
 
 ET                      NT   MA 
 
                         HI     
 
                  AN   PS 
 
                A    C  
 
                        
 
                        
 
                     
 
                     
 
                     
 
                     
 
                  
 
                  
 
                
 
 AZ  00      09  09  00  6.  5       EA  14  ST  Ac
 
 IT  09      -1  -2      00          ST  28  EP  ti
 
 HR  37      6-  4-      0           SI  03  HE  ve
 
 OM  14      20  20                  DE      NS    
 
 YC  61      09  09                              
 
 IN  8                               PH      DO    
 
                                    AR      N     
 
 25                                  MA      R     
 
 0                                   CY            
 
 MG                                             
 
                           OF            
 
 TA                        CY         
 
 BL                      NT      
 
 ET                      HI      
 
                  AN      
 
                A       
 
                       
 
                     
 
                  
 
                  
 
                  
 
                  
 
                  
 
               
 
              
 
 VE  00      07  08  00  18  18      EA  13  CO  Ac
 
 NT  17      -3  -1      .0          ST  68  MM  ti
 
 OL  30      1-  3-      00          SI  23  UN  ve
 
 IN  68      20  20                  DE      IT    
 
    22      09  09                         Y     
 
 HF  0                               PH      AL    
 
 A                                   AR      LE    
 
 90                                  MA      RG    
 
                                    CY      Y     
 
 MC                                        &     
 
 G                          OF      AS    
 
 IN                         CY      TH 
 
 FRANCIS                      NT   MA 
 
 LE                      HI     
 
 R                AN   PS 
 
                A    C  
 
                        
 
                        
 
                     
 
                     
 
                     
 
                     
 
                     
 
                    
 
                
 
 LO  60      01  06  01  30  30      EA  11  CO  Ac
 
 RA  50      -1  -1      .0          ST  08  MM  ti
 
 TA  50      3-  8-      00          SI  13  UN  ve
 
 DI  14      20  20                  DE      IT    
 
 NE  70      09  09                         Y     
 
    8                               PH      AL    
 
 10                                  AR      LE    
 
                                    MA      RG    
 
 MG                                  CY      Y     
 
                                          &     
 
 TA                         OF      AS    
 
 BL                         CY      TH 
 
 ET                      NT   MA 
 
                         HI     
 
                  AN   PS 
 
                A    C  
 
                        
 
                        
 
                     
 
                     
 
                     
 
                     
 
                  
 
                  
 
                
 
     00      01  06  03  30  30      EA  11  CO  Ac
 
     00      -1  -1      .0          ST  08  MM  ti
 
     60      3-  8-      00          SI  14  UN  ve
 
     11      20  20                  DE      IT    
 
     73      09  09                         Y     
 
     1                               PH      AL    
 
                                     AR      LE    
 
                                     MA      RG    
 
                                     CY      Y     
 
                                           &     
 
                            OF      AS    
 
                            CY      TH 
 
                         NT   MA 
 
                         HI     
 
                  AN   PS 
 
                A    C  
 
                        
 
                        
 
                  
 
                  
 
                  
 
                  
 
                  
 
                  
 
                
 
 ME  37      01  06  04  30  30      EA  11  CO  Ac
 
 IL  00      -1  -1      .0          ST  08  MM  ti
 
 OS  00      3-  8-      00          SI  12  UN  ve
 
 EC  45      20  20                  DE      IT    
 
    50      09  09                         Y     
 
 OT  2                               PH      AL    
 
 C                                   AR      LE    
 
 20                                  MA      RG    
 
 .6                                  CY      Y     
 
                                          &     
 
 MG                         OF      AS    
 
                           CY      TH 
 
 TA                      NT   MA 
 
 BL                      HI     
 
 ET               AN   PS 
 
                A    C  
 
                        
 
                        
 
                     
 
                     
 
                     
 
                     
 
                     
 
                     
 
                
 
     00      01  05  02  30  30      EA  11  CO  Ac
 
     00      -1  -0      .0          ST  08  MM  ti
 
     60      3-  7-      00          SI  14  UN  ve
 
     11      20  20                  DE      IT    
 
     73      09  09                         Y     
 
     1                               PH      AL    
 
                                     AR      LE    
 
                                     MA      RG    
 
                                     CY      Y     
 
                                           &     
 
                            OF      AS    
 
                            CY      TH 
 
                         NT   MA 
 
                         HI     
 
                  AN   PS 
 
                A    C  
 
                        
 
                        
 
                  
 
                  
 
                  
 
                  
 
                  
 
                  
 
                
 
 ME  37      01  05  03  30  30      EA  11  CO  Ac
 
 IL  00      -1  -0      .0          ST  08  MM  ti
 
 OS  00      3-  7-      00          SI  12  UN  ve
 
 EC  45      20  20                  DE      IT    
 
    50      09  09                         Y     
 
 OT  2                               PH      AL    
 
 C                                   AR      LE    
 
 20                                  MA      RG    
 
 .6                                  CY      Y     
 
                                          &     
 
 MG                         OF      AS    
 
                           CY      TH 
 
 TA                      NT   MA 
 
 BL                      HI     
 
 ET               AN   PS 
 
                A    C  
 
                        
 
                        
 
                     
 
                     
 
                     
 
                     
 
                     
 
                     
 
                
 
 LO  45      01  05  00  30  30      EA  11  CO  Ac
 
 RA  80      -1  -0      .0          ST  08  MM  ti
 
 TA  20      3-  7-      00          SI  13  UN  ve
 
 DI  65      20  20                  DE      IT    
 
 NE  08      09  09                         Y     
 
    7                               PH      AL    
 
 10                                  AR      LE    
 
                                    MA      RG    
 
 MG                                  CY      Y     
 
                                          &     
 
 TA                         OF      AS    
 
 BL                         CY      TH 
 
 ET                      NT   MA 
 
                         HI     
 
                  AN   PS 
 
                A    C  
 
                        
 
                        
 
                     
 
                     
 
                     
 
                     
 
                  
 
                  
 
                
 
     60      03  04  00  12  6       EA  12  ST  Ac
 
     25      -2  -0      0.          ST  01  EP  ti
 
     80      3-  9-      00          SI  46  HE  ve
 
     23      20  20      0           DE      NS    
 
     91      09  09                              
 
     6                               PH      DO    
 
                                     AR      N     
 
                                     MA      R     
 
                                     CY            
 
                                                
 
                            OF            
 
                            CY         
 
                           NT      
 
                         HI      
 
                  AN      
 
                A       
 
                       
 
                     
 
               
 
               
 
               
 
               
 
               
 
               
 
              
 
 AZ  00      03  04  00  6.  5       EA  12  ST  Ac
 
 IT  09      -2  -0      00          ST  01  EP  ti
 
 HR  37      3-  9-      0           SI  45  HE  ve
 
 OM  14      20  20                  DE      NS    
 
 YC  61      09  09                              
 
 IN  8                               PH      DO    
 
                                    AR      N     
 
 25                                  MA      R     
 
 0                                   CY            
 
 MG                                             
 
                           OF            
 
 TA                        CY         
 
 BL                      NT      
 
 ET                      HI      
 
                  AN      
 
                A       
 
                       
 
                     
 
                  
 
                  
 
                  
 
                  
 
                  
 
               
 
              
 
     00      01  03  01  30  30      EA  11  CO  Ac
 
     00      -1  -2      .0          ST  08  MM  ti
 
     60      3-  6-      00          SI  14  UN  ve
 
     11      20  20                  DE      IT    
 
     73      09  09                         Y     
 
     1                               PH      AL    
 
                                     AR      LE    
 
                                     MA      RG    
 
                                     CY      Y     
 
                                           &     
 
                            OF      AS    
 
                            CY      TH 
 
                         NT   MA 
 
                         HI     
 
                  AN   PS 
 
                A    C  
 
                        
 
                        
 
                  
 
                  
 
                  
 
                  
 
                  
 
                  
 
                
 
 ME  37      01  03  02  30  30      EA  11  CO  Ac
 
 IL  00      -1  -2      .0          ST  08  MM  ti
 
 OS  00      3-  6-      00          SI  12  UN  ve
 
 EC  45      20  20                  DE      IT    
 
    50      09  09                         Y     
 
 OT  2                               PH      AL    
 
 C                                   AR      LE    
 
 20                                  MA      RG    
 
 .6                                  CY      Y     
 
                                          &     
 
 MG                         OF      AS    
 
                           CY      TH 
 
 TA                      NT   MA 
 
 BL                      HI     
 
 ET               AN   PS 
 
                A    C  
 
                        
 
                        
 
                     
 
                     
 
                     
 
                     
 
                     
 
                     
 
                
 
 ME  37      01  02  01  30  30      EA  11  CO  Ac
 
 IL  00      -1  -2      .0          ST  08  MM  ti
 
 OS  00      3-  6-      00          SI  12  UN  ve
 
 EC  45      20  20                  DE      IT    
 
    50      09  09                         Y     
 
 OT  2                               PH      AL    
 
 C                                   AR      LE    
 
 20                                  MA      RG    
 
 .6                                  CY      Y     
 
                                          &     
 
 MG                         OF      AS    
 
                           CY      TH 
 
 TA                      NT   MA 
 
 BL                      HI     
 
 ET               AN   PS 
 
                A    C  
 
                        
 
                        
 
                     
 
                     
 
                     
 
                     
 
                     
 
                     
 
                
 
     00      01  02  00  30  30      EA  11  CO  Ac
 
     00      -1  -2      .0          ST  08  MM  ti
 
     60      3-  6-      00          SI  14  UN  ve
 
     11      20  20                  DE      IT    
 
     73      09  09                         Y     
 
     1                               PH      AL    
 
                                     AR      LE    
 
                                     MA      RG    
 
                                     CY      Y     
 
                                           &     
 
                            OF      AS    
 
                            CY      TH 
 
                         NT   MA 
 
                         HI     
 
                  AN   PS 
 
                A    C  
 
                        
 
                        
 
                  
 
                  
 
                  
 
                  
 
                  
 
                  
 
                
 
 ME  37      01  01  00  30  30      EA  11  CO  Ac
 
 IL  00      -1  -3      .0          ST  08  MM  ti
 
 OS  00      3-  0-      00          SI  12  UN  ve
 
 EC  45      20  20                  DE      IT    
 
    50      09  09                         Y     
 
 OT  2                               PH      AL    
 
 C                                   AR      LE    
 
 20                                  MA      RG    
 
 .6                                  CY      Y     
 
                                          &     
 
 MG                         OF      AS    
 
                           CY      TH 
 
 TA                      NT   MA 
 
 BL                      HI     
 
 ET               AN   PS 
 
                A    C  
 
                        
 
                        
 
                     
 
                     
 
                     
 
                     
 
                     
 
                     
 
                
 
 NA  00      01  01  00  17  30      EA  11  CO  Ac
 
 SO  08      -1  -3      .0          ST  08  MM  ti
 
 NE  51      3-  0-      00          SI  11  UN  ve
 
 X   28      20  20                  DE      IT    
 
 50  80      09  09                         Y     
 
    1                               PH      AL    
 
 MC                                  AR      LE    
 
 G                                   MA      RG    
 
 NA                                  CY      Y     
 
 SA                                        &     
 
 L                          OF      AS    
 
 SP                         CY      TH 
 
 RA                      NT   MA 
 
 Y                       HI     
 
                  AN   PS 
 
                A    C  
 
                        
 
                        
 
                     
 
                     
 
                     
 
                     
 
                    
 
                  
 
                
 
 AZ  00      01  01  00  6.  5       EA  11  ST  Ac
 
 IT  09      -2  -3      00          ST  18  EP  ti
 
 HR  37      1-  0-      0           SI  16  HE  ve
 
 OM  14      20  20                  DE      NS    
 
 YC  61      09  09                              
 
 IN  8                               PH      DO    
 
                                    AR      N     
 
 25                                  MA      R     
 
 0                                   CY            
 
 MG                                             
 
                           OF            
 
 TA                        CY         
 
 BL                      NT      
 
 ET                      HI      
 
                  AN      
 
                A       
 
                       
 
                     
 
                  
 
                  
 
                  
 
                  
 
                  
 
               
 
              
 
     00      07  01  05  30  30      EA  98  CO  Ac
 
     00      -0  -0      .0          ST  63  MM  ti
 
     60      7-  1-      00          SI  68  UN  ve
 
     11      20  20                  DE      IT    
 
     73      08  09                         Y     
 
     1                               PH      AL    
 
                                     AR      LE    
 
                                     MA      RG    
 
                                     CY      Y     
 
                                           &     
 
                            OF      AS    
 
                            CY      TH 
 
                         NT   MA 
 
                         HI     
 
                  AN   PS 
 
                A    C  
 
                        
 
                        
 
                  
 
                  
 
                  
 
                  
 
                  
 
                  
 
                
 
 LO  60      10  01  02  30  30      EA  99  CO  Ac
 
 RA  50      -2  -0      .0          ST  94  MM  ti
 
 TA  50      1-  1-      00          SI  12  UN  ve
 
 DI  14      20  20                  DE      IT    
 
 NE  70      08  09                         Y     
 
    1                               PH      AL    
 
 10                                  AR      LE    
 
                                    MA      RG    
 
 MG                                  CY      Y     
 
                                          &     
 
 TA                         OF      AS    
 
 BL                         CY      TH 
 
 ET                      NT   MA 
 
                         HI     
 
                  AN   PS 
 
                A    C  
 
                        
 
                        
 
                     
 
                     
 
                     
 
                     
 
                  
 
                  
 
                
 
     49      07  01  05  60  30      EA  98  CO  Ac
 
     88      -0  -0      .0          ST  63  MM  ti
 
     40      7-  1-      00          SI  67  UN  ve
 
     54      20  20                  DE      IT    
 
     41      08  09                         Y     
 
     0                               PH      AL    
 
                                     AR      LE    
 
                                     MA      RG    
 
                                     CY      Y     
 
                                           &     
 
                            OF      AS    
 
                            CY      TH 
 
                         NT   MA 
 
                         HI     
 
                  AN   PS 
 
                A    C  
 
                        
 
                        
 
                  
 
                  
 
                  
 
                  
 
                  
 
                  
 
                
 
 ME  60      12  12  00  12  3       EA  10  ST  Ac
 
 OM  43      -0  -1      0.          ST  57  EP  ti
 
 ET  20      8-  8-      00          SI  97  HE  ve
 
 HA  60      20  20      0           DE      NS    
 
 ZI  80      08  08                              
 
 NE  4                               PH      DO    
 
                                    AR      N     
 
 6.                                  MA      R     
 
 25                                  CY            
 
                                               
 
 MG                         OF            
 
 /5                         CY         
 
                          NT      
 
 ML                      HI      
 
                 AN      
 
 SY             A       
 
 RP                    
 
                     
 
                  
 
                  
 
                  
 
                  
 
                  
 
                  
 
                 
 
               
 
     00      07  12  04  30  30      EA  98  CO  Ac
 
     00      -0  -0      .0          ST  63  MM  ti
 
     60      7-  4-      00          SI  68  UN  ve
 
     11      20  20                  DE      IT    
 
     73      08  08                         Y     
 
     1                               PH      AL    
 
                                     AR      LE    
 
                                     MA      RG    
 
                                     CY      Y     
 
                                           &     
 
                            OF      AS    
 
                            CY      TH 
 
                         NT   MA 
 
                         HI     
 
                  AN   PS 
 
                A    C  
 
                        
 
                        
 
                  
 
                  
 
                  
 
                  
 
                  
 
                  
 
                
 
     49      07  12  04  60  30      EA  98  CO  Ac
 
     88      -0  -0      .0          ST  63  MM  ti
 
     40      7-  4-      00          SI  67  UN  ve
 
     54      20  20                  DE      IT    
 
     40      08  08                         Y     
 
     2                               PH      AL    
 
                                     AR      LE    
 
                                     MA      RG    
 
                                     CY      Y     
 
                                           &     
 
                            OF      AS    
 
                            CY      TH 
 
                         NT   MA 
 
                         HI     
 
                  AN   PS 
 
                A    C  
 
                        
 
                        
 
                  
 
                  
 
                  
 
                  
 
                  
 
                  
 
                
 
 LO  60      10  12  01  30  30      EA  99  CO  Ac
 
 RA  50      -2  -0      .0          ST  94  MM  ti
 
 TA  50      1-  4-      00          SI  12  UN  ve
 
 DI  14      20  20                  DE      IT    
 
 NE  70      08  08                         Y     
 
    1                               PH      AL    
 
 10                                  AR      LE    
 
                                    MA      RG    
 
 MG                                  CY      Y     
 
                                          &     
 
 TA                         OF      AS    
 
 BL                         CY      TH 
 
 ET                      NT   MA 
 
                         HI     
 
                  AN   PS 
 
                A    C  
 
                        
 
                        
 
                     
 
                     
 
                     
 
                     
 
                  
 
                  
 
                
 
 RA  00      07  11  03  60  30      EA  98  CO  Ac
 
 NI  17      -0  -0      .0          ST  63  MM  ti
 
 TI  24      7-  7-      00          SI  67  UN  ve
 
 DI  35      20  20                  DE      IT    
 
 NE  77      08  08                         Y     
 
    0                               PH      AL    
 
 15                                  AR      LE    
 
 0                                   MA      RG    
 
 MG                                  CY      Y     
 
                                          &     
 
 TA                         OF      AS    
 
 BL                         CY      TH 
 
 ET                      NT   MA 
 
                         HI     
 
                  AN   PS 
 
                A    C  
 
                        
 
                        
 
                     
 
                     
 
                     
 
                     
 
                  
 
                  
 
                
 
     00      07  11  03  30  30      EA  98  CO  Ac
 
     00      -0  -0      .0          ST  63  MM  ti
 
     60      7-  7-      00          SI  68  UN  ve
 
     11      20  20                  DE      IT    
 
     73      08  08                         Y     
 
     1                               PH      AL    
 
                                     AR      LE    
 
                                     MA      RG    
 
                                     CY      Y     
 
                                           &     
 
                            OF      AS    
 
                            CY      TH 
 
                         NT   MA 
 
                         HI     
 
                  AN   PS 
 
                A    C  
 
                        
 
                        
 
                  
 
                  
 
                  
 
                  
 
                  
 
                  
 
                
 
 LO  60      10  11  00  30  30      EA  99  CO  Ac
 
 RA  50      -2  -0      .0          ST  94  MM  ti
 
 TA  50      1-  7-      00          SI  12  UN  ve
 
 DI  14      20  20                  DE      IT    
 
 NE  70      08  08                         Y     
 
    1                               PH      AL    
 
 10                                  AR      LE    
 
                                    MA      RG    
 
 MG                                  CY      Y     
 
                                          &     
 
 TA                         OF      AS    
 
 BL                         CY      TH 
 
 ET                      NT   MA 
 
                         HI     
 
                  AN   PS 
 
                A    C  
 
                        
 
                        
 
                     
 
                     
 
                     
 
                     
 
                  
 
                  
 
                
 
 AM  00      10  10  00  21  7       EA  99  No  Ac
 
 OX  78      -0  -2      .0          ST  73  t   ti
 
 IC  12      4-  3-      00          SI  66  Av  ve
 
 IL  02      20  20                  DE      ai    
 
 LI  00      08  08                         la    
 
 N   5                               PH      bl    
 
 25                                  AR      e     
 
 0                                   MA            
 
 MG                                  CY            
 
                                               
 
 CA                         OF            
 
 PS                         CY         
 
 UL                      NT      
 
 E                       HI      
 
                  AN      
 
                A      
 
                     
 
                     
 
                  
 
                  
 
                  
 
                  
 
                 
 
               
 
              
 
     00      07  10  02  30  30      EA  98  No  Ac
 
     00      -0  -0      .0          ST  63  t   ti
 
     60      7-  9-      00          SI  68  Av  ve
 
     11      20  20                  DE      ai    
 
     73      08  08                         la    
 
     1                               PH      bl    
 
                                     AR      e     
 
                                     MA            
 
                                     CY            
 
                                                 
 
                            OF            
 
                            CY         
 
                         NT      
 
                         HI      
 
                  AN      
 
                A      
 
                     
 
                     
 
               
 
               
 
               
 
               
 
               
 
               
 
              
 
 LO  60      09  10  00  30  30      EA  99  No  Ac
 
 RA  50      -2  -0      .0          ST  55  t   ti
 
 TA  50      2-  9-      00          SI  80  Av  ve
 
 DI  14      20  20                  DE      ai    
 
 NE  70      08  08                         la    
 
    1                               PH      bl    
 
 10                                  AR      e     
 
                                    MA            
 
 MG                                  CY            
 
                                                
 
 TA                         OF            
 
 BL                         CY         
 
 ET                      NT      
 
                         HI      
 
                  AN      
 
                A      
 
                     
 
                     
 
                  
 
                  
 
                  
 
                  
 
               
 
               
 
              
 
     49      07  10  02  60  30      EA  98  No  Ac
 
     88      -0  -0      .0          ST  63  t   ti
 
     40      7-  9-      00          SI  67  Av  ve
 
     54      20  20                  DE      ai    
 
     40      08  08                         la    
 
     2                               PH      bl    
 
                                     AR      e     
 
                                     MA            
 
                                     CY            
 
                                                 
 
                            OF            
 
                            CY         
 
                         NT      
 
                         HI      
 
                  AN      
 
                A      
 
                     
 
                     
 
               
 
               
 
               
 
               
 
               
 
               
 
              
 
     00      07  08  01  30  30      EA  98  No  Ac
 
     00      -0  -2      .0          ST  63  t   ti
 
     60      7-  8-      00          SI  68  Av  ve
 
     11      20  20                  DE      ai    
 
     73      08  08                         la    
 
     1                               PH      bl    
 
                                     AR      e     
 
                                     MA            
 
                                     CY            
 
                                                 
 
                            OF            
 
                            CY         
 
                         NT      
 
                         HI      
 
                  AN      
 
                A      
 
                     
 
                     
 
               
 
               
 
               
 
               
 
               
 
               
 
              
 
 LO  60      01  08  06  30  30      EA  96  No  Ac
 
 RA  50      -2  -2      .0          ST  44  t   ti
 
 TA  50      1-  8-      00          SI  65  Av  ve
 
 DI  14      20  20                  DE      ai    
 
 NE  70      08  08                         la    
 
    1                               PH      bl    
 
 10                                  AR      e     
 
                                    MA            
 
 MG                                  CY            
 
                                                
 
 TA                         OF            
 
 BL                         CY         
 
 ET                      NT      
 
                         HI      
 
                  AN      
 
                A      
 
                     
 
                     
 
                  
 
                  
 
                  
 
                  
 
               
 
               
 
              
 
     49      07  08  01  60  30      EA  98  No  Ac
 
     88      -0  -2      .0          ST  63  t   ti
 
     40      7-  8-      00          SI  67  Av  ve
 
     54      20  20                  DE      ai    
 
     40      08  08                         la    
 
     2                               PH      bl    
 
                                     AR      e     
 
                                     MA            
 
                                     CY            
 
                                                 
 
                            OF            
 
                            CY         
 
                         NT      
 
                         HI      
 
                  AN      
 
                A      
 
                     
 
                     
 
               
 
               
 
               
 
               
 
               
 
               
 
              
 
 LO  60      01  08  05  30  30      EA  96  No  Ac
 
 RA  50      -2  -0      .0          ST  44  t   ti
 
 TA  50      1-  1-      00          SI  65  Av  ve
 
 DI  14      20  20                  DE      ai    
 
 NE  70      08  08                         la    
 
    1                               PH      bl    
 
 10                                  AR      e     
 
                                    MA            
 
 MG                                  CY            
 
                                                
 
 TA                         OF            
 
 BL                         CY         
 
 ET                      NT      
 
                         HI      
 
                  AN      
 
                A      
 
                     
 
                     
 
                  
 
                  
 
                  
 
                  
 
               
 
               
 
              
 
 SE  45      01  08  01  12  4       EA  96  No  Ac
 
 LE  80      -0  -0      0.          ST  28  t   ti
 
 NI  20      9-  1-      00          SI  45  Av  ve
 
 UM  04      20  20      0           DE      ai    
 
    06      08  08                         la    
 
 [place]  4                               PH      bl    
 
 LF                                  AR      e     
 
 ID                                  MA            
 
 E                                   CY            
 
 2.                                             
 
 5%                         OF            
 
                           CY         
 
 LO                        NT      
 
 TI                      HI      
 
 ON               AN      
 
                A      
 
                     
 
                     
 
                  
 
                  
 
                  
 
                  
 
                  
 
                  
 
              
 
     00      07  07  00  30  30      EA  98  No  Ac
 
     00      -0  -1      .0          ST  63  t   ti
 
     60      7-  7-      00          SI  68  Av  ve
 
     11      20  20                  DE      ai    
 
     73      08  08                         la    
 
     1                               PH      bl    
 
                                     AR      e     
 
                                     MA            
 
                                     CY            
 
                                                 
 
                            OF            
 
                            CY         
 
                         NT      
 
                         HI      
 
                  AN      
 
                A      
 
                     
 
                     
 
               
 
               
 
               
 
               
 
               
 
               
 
              
 
 NA  00      07  07  00  17  17      EA  98  No  Ac
 
 SO  08      -0  -1      .0          ST  63  t   ti
 
 NE  51      7-  7-      00          SI  66  Av  ve
 
 X   28      20  20                  DE      ai    
 
 50  80      08  08                         la    
 
    1                               PH      bl    
 
 MC                                  AR      e     
 
 G                                   MA            
 
 NA                                  CY            
 
 SA                                              
 
 L                          OF            
 
 SP                         CY         
 
 RA                      NT      
 
 Y                       HI      
 
                  AN      
 
                A      
 
                     
 
                     
 
                  
 
                  
 
                  
 
                  
 
                 
 
               
 
              
 
     49      07  07  00  60  30      EA  98  No  Ac
 
     88      -0  -1      .0          ST  63  t   ti
 
     40      7-  7-      00          SI  67  Av  ve
 
     54      20  20                  DE      ai    
 
     40      08  08                         la    
 
     2                               PH      bl    
 
                                     AR      e     
 
                                     MA            
 
                                     CY            
 
                                                 
 
                            OF            
 
                            CY         
 
                         NT      
 
                         HI      
 
                  AN      
 
                A      
 
                     
 
                     
 
               
 
               
 
               
 
               
 
               
 
               
 
              
 
 VE  00      07  07  00  18  18      EA  98  No  Ac
 
 NT  17      -0  -1      .0          ST  63  t   ti
 
 OL  30      7-  7-      00          SI  69  Av  ve
 
 IN  68      20  20                  DE      ai    
 
    22      08  08                         la    
 
 HF  0                               PH      bl    
 
 A                                   AR      e     
 
 90                                  MA            
 
                                    CY            
 
 MC                                              
 
 G                          OF            
 
 IN                         CY         
 
 [place]ANC[place]                      NT      
 
 LE                      HI      
 
 R                AN      
 
                A      
 
                     
 
                     
 
                  
 
                  
 
                  
 
                  
 
                  
 
                 
 
              
 
 NA  00      01  07  02  17  17      EA  96  No  Ac
 
 SO  08      -2  -0      .0          ST  44  t   ti
 
 NE  51      1-  3-      00          SI  61  Av  ve
 
 X   28      20  20                  DE      ai    
 
 50  80      08  08                         la    
 
    1                               PH      bl    
 
 MC                                  AR      e     
 
 G                                   MA            
 
 NA                                  CY            
 
 SA                                              
 
 L                          OF            
 
 SP                         CY         
 
 RA                      NT      
 
 Y                       HI      
 
                  AN      
 
                A      
 
                     
 
                     
 
                  
 
                  
 
                  
 
                  
 
                 
 
               
 
              
 
 LO  60      01  06  04  30  30      EA  96  No  Ac
 
 RA  50      -2  -1      .0          ST  44  t   ti
 
 TA  50      1-  2-      00          SI  65  Av  ve
 
 DI  14      20  20                  DE      ai    
 
 NE  70      08  08                         la    
 
    1                               PH      bl    
 
 10                                  AR      e     
 
                                    MA            
 
 MG                                  CY            
 
                                                
 
 TA                         OF            
 
 BL                         CY         
 
 ET                      NT      
 
                         HI      
 
                  AN      
 
                A      
 
                     
 
                     
 
                  
 
                  
 
                  
 
                  
 
               
 
               
 
              
 
     00      01  06  04  30  30      EA  96  No  Ac
 
     00      -2  -1      .0          ST  44  t   ti
 
     60      1-  2-      00          SI  63  Av  ve
 
     11      20  20                  DE      ai    
 
     73      08  08                         la    
 
     1                               PH      bl    
 
                                     AR      e     
 
                                     MA            
 
                                     CY            
 
                                                 
 
                            OF            
 
                            CY         
 
                         NT      
 
                         HI      
 
                  AN      
 
                A      
 
                     
 
                     
 
               
 
               
 
               
 
               
 
               
 
               
 
              
 
     49      01  06  04  60  30      EA  96  No  Ac
 
     88      -2  -1      .0          ST  44  t   ti
 
     40      1-  2-      00          SI  64  Av  ve
 
     54      20  20                  DE      ai    
 
     40      08  08                         la    
 
     2                               PH      bl    
 
                                     AR      e     
 
                                     MA            
 
                                     CY            
 
                                                 
 
                            OF            
 
                            CY         
 
                         NT      
 
                         HI      
 
                  AN      
 
                A      
 
                     
 
                     
 
               
 
               
 
               
 
               
 
               
 
               
 
              
 
     49      01  05  03  60  30      EA  96  No  Ac
 
     88      -2  -2      .0          ST  44  t   ti
 
     40      1-  2-      00          SI  64  Av  ve
 
     54      20  20                  DE      ai    
 
     40      08  08                         la    
 
     2                               PH      bl    
 
                                     AR      e     
 
                                     MA            
 
                                     CY            
 
                                                 
 
                            OF            
 
                            CY         
 
                         NT      
 
                         HI      
 
                  AN      
 
                A      
 
                     
 
                     
 
               
 
               
 
               
 
               
 
               
 
               
 
              
 
 LO  60      01  05  03  30  30      EA  96  No  Ac
 
 RA  50      -2  -2      .0          ST  44  t   ti
 
 TA  50      1-  2-      00          SI  65  Av  ve
 
 DI  14      20  20                  DE      ai    
 
 NE  70      08  08                         la    
 
    1                               PH      bl    
 
 10                                  AR      e     
 
                                    MA            
 
 MG                                  CY            
 
                                                
 
 TA                         OF            
 
 BL                         CY         
 
 ET                      NT      
 
                         HI      
 
                  AN      
 
                A      
 
                     
 
                     
 
                  
 
                  
 
                  
 
                  
 
               
 
               
 
              
 
     00      01  05  03  30  30      EA  96  No  Ac
 
     00      -2  -2      .0          ST  44  t   ti
 
     60      1-  2-      00          SI  63  Av  ve
 
     11      20  20                  DE      ai    
 
     73      08  08                         la    
 
     1                               PH      bl    
 
                                     AR      e     
 
                                     MA            
 
                                     CY            
 
                                                 
 
                            OF            
 
                            CY         
 
                         NT      
 
                         HI      
 
                  AN      
 
                A      
 
                     
 
                     
 
               
 
               
 
               
 
               
 
               
 
               
 
              
 
     49      01  04  02  60  30      EA  96  No  Ac
 
     88      -2  -1      .0          ST  44  t   ti
 
     40      1-  0-      00          SI  64  Av  ve
 
     54      20  20                  DE      ai    
 
     40      08  08                         la    
 
     2                               PH      bl    
 
                                     AR      e     
 
                                     MA            
 
                                     CY            
 
                                                 
 
                            OF            
 
                            CY         
 
                         NT      
 
                         HI      
 
                  AN      
 
                A      
 
                     
 
                     
 
               
 
               
 
               
 
               
 
               
 
               
 
              
 
 LO  60      01  04  02  30  30      EA  96  No  Ac
 
 RA  50      -2  -1      .0          ST  44  t   ti
 
 TA  50      1-  0-      00          SI  65  Av  ve
 
 DI  14      20  20                  DE      ai    
 
 NE  70      08  08                         la    
 
    1                               PH      bl    
 
 10                                  AR      e     
 
                                    MA            
 
 MG                                  CY            
 
                                                
 
 TA                         OF            
 
 BL                         CY         
 
 ET                      NT      
 
                         HI      
 
                  AN      
 
                A      
 
                     
 
                     
 
                  
 
                  
 
                  
 
                  
 
               
 
               
 
              
 
     00      01  04  02  30  30      EA  96  No  Ac
 
     00      -2  -1      .0          ST  44  t   ti
 
     60      1-  0-      00          SI  63  Av  ve
 
     11      20  20                  DE      ai    
 
     73      08  08                         la    
 
     1                               PH      bl    
 
                                     AR      e     
 
                                     MA            
 
                                     CY            
 
                                                 
 
                            OF            
 
                            CY         
 
                         NT      
 
                         HI      
 
                  AN      
 
                A      
 
                     
 
                     
 
               
 
               
 
               
 
               
 
               
 
               
 
              
 
     00      01  04  01  30  30      EA  96  No  Ac
 
     00      -2  -0      .0          ST  44  t   ti
 
     60      1-  7-      00          SI  63  Av  ve
 
     11      20  20                  DE      ai    
 
     73      08  08                         la    
 
     1                               PH      bl    
 
                                     AR      e     
 
                                     MA            
 
                                     CY            
 
                                                 
 
                            OF            
 
                            CY         
 
                         NT      
 
                         HI      
 
                  AN      
 
                A      
 
                     
 
                     
 
               
 
               
 
               
 
               
 
               
 
               
 
              
 
 NA  00      01  04  01  17  17      EA  96  No  Ac
 
 SO  08      -2  -0      .0          ST  44  t   ti
 
 NE  51      1-  7-      00          SI  61  Av  ve
 
 X   28      20  20                  DE      ai    
 
 50  80      08  08                         la    
 
    1                               PH      bl    
 
 MC                                  AR      e     
 
 G                                   MA            
 
 NA                                  CY            
 
 SA                                              
 
 L                          OF            
 
 SP                         CY         
 
 RA                      NT      
 
 Y                       HI      
 
                  AN      
 
                A      
 
                     
 
                     
 
                  
 
                  
 
                  
 
                  
 
                 
 
               
 
              
 
 LO  60      01  04  01  30  30      EA  96  No  Ac
 
 RA  50      -2  -0      .0          ST  44  t   ti
 
 TA  50      1-  7-      00          SI  65  Av  ve
 
 DI  14      20  20                  DE      ai    
 
 NE  70      08  08                         la    
 
    1                               PH      bl    
 
 10                                  AR      e     
 
                                    MA            
 
 MG                                  CY            
 
                                                
 
 TA                         OF            
 
 BL                         CY         
 
 ET                      NT      
 
                         HI      
 
                  AN      
 
                A      
 
                     
 
                     
 
                  
 
                  
 
                  
 
                  
 
               
 
               
 
              
 
     49      01  04  01  60  30      EA  96  No  Ac
 
     88      -2  -0      .0          ST  44  t   ti
 
     40      1-  7-      00          SI  64  Av  ve
 
     54      20  20                  DE      ai    
 
     40      08  08                         la    
 
     2                               PH      bl    
 
                                     AR      e     
 
                                     MA            
 
                                     CY            
 
                                                 
 
                            OF            
 
                            CY         
 
                         NT      
 
                         HI      
 
                  AN      
 
                A      
 
                     
 
                     
 
               
 
               
 
               
 
               
 
               
 
               
 
              
 
 LO  60      01  03  00  30  30      EA  96  No  Ac
 
 RA  50      -2  -2      .0          ST  44  t   ti
 
 TA  50      1-  6-      00          SI  65  Av  ve
 
 DI  14      20  20                  DE      ai    
 
 NE  70      08  08                         la    
 
    1                               PH      bl    
 
 10                                  AR      e     
 
                                    MA            
 
 MG                                  CY            
 
                                                
 
 TA                         OF            
 
 BL                         CY         
 
 ET                      NT      
 
                         HI      
 
                  AN      
 
                A      
 
                     
 
                     
 
                  
 
                  
 
                  
 
                  
 
               
 
               
 
              
 
     49      01  03  00  60  30      EA  96  No  Ac
 
     88      -2  -2      .0          ST  44  t   ti
 
     40      1  6      00          SI  64  Av  ve
 
     54      20  20                  DE      ai    
 
     40      08  08                         la    
 
     2                               PH      bl    
 
                                     AR      e     
 
                                     MA            
 
                                     CY            
 
                                                 
 
                            OF            
 
                            CY         
 
                         NT      
 
                         HI      
 
                  AN      
 
                A      
 
                     
 
                     
 
               
 
               
 
               
 
               
 
               
 
               
 
              
 
 AM  00      01  03  00  30  10      EA  96  No  Ac
 
 OX  78      -3  -2      .0          ST  58  t   ti
 
 IC  12        6          SI  70  Av  ve
 
 IL  61      20  20                  DE      ai    
 
 LI  30      08  08                         la    
 
 N   5                               PH      bl    
 
 50                                  AR      e     
 
 0                                   MA            
 
 MG                                  CY            
 
                                                
 
 CA                         OF            
 
 PS                         CY         
 
 UL                      NT      
 
 E                       HI      
 
                  AN      
 
                A      
 
                     
 
                     
 
                  
 
                  
 
                  
 
                  
 
                 
 
               
 
              
 
     00      01  03  00  30  30      EA  96  No  Ac
 
     00      -2  -2      .0          ST  44  t   ti
 
     60      1  6      00          SI  63  Av  ve
 
     11      20  20                  DE      ai    
 
     73      08  08                         la    
 
     1                               PH      bl    
 
                                     AR      e     
 
                                     MA            
 
                                     CY            
 
                                                 
 
                            OF            
 
                            CY         
 
                         NT      
 
                         HI      
 
                  AN      
 
                A      
 
                     
 
                     
 
               
 
               
 
               
 
               
 
               
 
               
 
              
 
 NA  00      01  03  00  17  17      EA  96  No  Ac
 
 SO  08      -2  -2      .0          ST  44  t   ti
 
 NE  51                SI  61  Av  ve
 
 X   28      20  20                  DE      ai    
 
 50  80      08  08                         la    
 
    1                               PH      bl    
 
 MC                                  AR      e     
 
 G                                   MA            
 
 NA                                  CY            
 
 SA                                              
 
 L                          OF            
 
 SP                         CY         
 
 RA                      NT      
 
 Y                       HI      
 
                  AN      
 
                A      
 
                     
 
                     
 
                  
 
                  
 
                  
 
                  
 
                 
 
               
 
              
 
 VE  00        03  00  18  18      EA  96  No  Ac
 
 NT  17      -2  -2      .0          ST  44  t   ti
 
 OL  30                SI  62  Av  ve
 
 IN  68      20  20                  DE      ai    
 
    22      08  08                         la    
 
 HF  0                               PH      bl    
 
 A                                   AR      e     
 
 90                                  MA            
 
                                    CY            
 
 MC                                              
 
 G                          OF            
 
 IN                         CY         
 
 FRANCIS                      NT      
 
 LE                      HI      
 
 R                AN      
 
                A      
 
                     
 
                     
 
                  
 
                  
 
                  
 
                  
 
                  
 
                 
 
              
 
 SE  45      01  03  00  12  4       EA  96  No  Ac
 
 LE  80      -0  -2      0.          ST  28  t   ti
 
 NI  20      9  4-      00          SI  45  Av  ve
 
 UM  04      20  20      0           DE      ai    
 
    06      08  08                         la    
 
 [place]  4                               PH      bl    
 
 LF                                  AR      e     
 
 ID                                  MA            
 
 E                                   CY            
 
 2.                                             
 
 5%                         OF            
 
                           CY         
 
 LO                        NT      
 
 TI                      HI      
 
 ON               AN      
 
                A      
 
                     
 
                     
 
                  
 
                  
 
                  
 
                  
 
                  
 
                  
 
              
 
                                                                                
                                                                                
                                                                                
                                                                                
                                                                                
                                                                                
                                                                                
                                                                                
                                                                                
                                                                                
                                                                                
                                                                                
                                                                                
                                                                                
                                                                                
                                                                         
 
Results
                      
 
 
 Labs                
 
 
 
 
 Lab   Lab   Date    Result  Refere  Interp  Status  Commen
 
 Order   Detail                  nces   retati          t     
 
                                 Range   on                    
 
                                                            
 
                                  
 
                
 
 
 
 
 Hemoglobin A1c measurement (10- 14:15)
 
                 
 
 
 
 
          Hemoglo  10-12-2  5.2 %    0.0-7.0           complet
 
          bin A1c  017                              ed     
 
                 14:15                                      
 
                                           
 
                           
 
           
 
 
 
 
                      Comment:               < 6%   NON-DIABETIC 
 
         LEVEL                
 
                      Comment:               < 7%   CONTROLLED 
 
         DIABETIC LEVEL       
 
            
 
                      Comment:               > 8%   POORLY 
 
         CONTROLLED DIABETIC 
 
   LEVEL                
 
 
 
 
 Serum or plasma thyroid stimulating horm (10- 14:15)
 
                 
 
 
 
 
          Serum   10-12-2  = 2.03   0.358-3           complet
 
          or   017   uIU/ml   .740              ed     
 
        plasma   14:15                                      
 
  thyroid                                          
 
                              
 
  stimula          
 
  ting      
 
  horm        
 
              
 
              
 
        
 
 
 
 
 Lipid profile (10- 14:15)
 
                 
 
 
 
 
          Serum   10-12-2  = 11.8   0-40              complet
 
          or   017                              ed     
 
        plasma   14:15                                      
 
  cholest                                
 
  ilda in                     
 
   VLDL           
 
  brandan        
 
              
 
              
 
        
 
          Serum   10-12-2  = 59               complet
 
          or   017   mg/dL                      ed     
 
        plasma   14:15                                      
 
  triglyc                                      
 
  eride                         
 
  measure          
 
  ment        
 
              
 
              
 
        
 
          Serum   10-12-2  = 78.2   0-130             complet
 
          or   017   mg/dL                      ed     
 
        plasma   14:15                                      
 
  cholest                                     
 
  ilda in                        
 
   LDL           
 
  measu       
 
              
 
              
 
         
 
          Serum   10-12-2  = 46.0   40-60             complet
 
          or   017   MG/DL                      ed     
 
        plasma   14:15                                      
 
  cholest                                     
 
  ilda in                        
 
   HDL           
 
  measu       
 
              
 
              
 
         
 
          Total   10-12-2  = 136   < 200             complet
 
          cholest  017   mg/dL                      ed     
 
        ilda   14:15                                      
 
  measure                                     
 
  ment                           
 
                   
 
              
 
        
 
 
 
 
 Serum or plasma free thyroxine (T4) brandan (10- 14:15)
 
                 
 
 
 
 
          Serum   10-12-2  = 1.17   0.76-1.           complet
 
          or   017   ng/dL    46                ed     
 
        plasma   14:15                                      
 
  free                                          
 
  thyroxi                        
 
  ne (T4)          
 
   brandan       
 
              
 
              
 
         
 
 
 
 
 Comprehensive metabolic panel (10- 14:15)
 
                 
 
 
 
 
          Protein  10-12-2  = 7.6   6.4-8.2           complet
 
           total   017   gm/dL                      ed     
 
        ser/gabrielle  14:15                                      
 
  s                                             
 
                                 
 
                 
 
          ALT   10-12-2  = 17   12-78             complet
 
          (SGPT)   017   U/L                        ed     
 
        ser/gabrielle  14:15                                      
 
  s                                           
 
                              
 
                 
 
          Serum   10-12-2  = 13   15-37             complet
 
          or   017   U/L                        ed     
 
        plasma   14:15                                      
 
  asparta                                     
 
  te                      
 
  aminotr          
 
  ansfera     
 
              
 
              
 
           
 
          Serum   10-12-2  = 142   136-145           complet
 
          sodium   017   mmoL/L                     ed     
 
        measure  14:15                                      
 
  ment                                          
 
                                  
 
                   
 
        
 
          Serum   10-12-2  = 4.0   3.5-5.1           complet
 
          potassi  017   mmoL/L                     ed     
 
        um   14:15                                      
 
  measure                                       
 
  ment                            
 
                   
 
              
 
        
 
          Serum   10-12-2  = 96               complet
 
          or   017   mg/dL                      ed     
 
        plasma   14:15                                      
 
  glucose                                      
 
                           
 
  measure          
 
  ment      
 
  (mas        
 
              
 
              
 
        
 
          Serum   10-12-2  = 3.2   1.3-3.2           complet
 
          globuli  017   gm/dL                      ed     
 
        n   14:15                                      
 
  measure                                       
 
  ment                         
 
  (mass/v          
 
  olume)      
 
              
 
              
 
          
 
          Estimat  10-12-2  = 91   59-               complet
 
          ed   017   ML/MIN                     ed     
 
        glomeru  14:15                                      
 
  lar                                   
 
  filtrat                         
 
  ion           
 
  rate      
 
  (GF         
 
              
 
              
 
 
 
 
                      Comment:               REFERENCE RANGE: >60 
 
         ML/MIN/1.73 SQUARE 
 
   METERS               
 
    
 
                      Comment:               If this patient is 
 
         -American, 
 
   then multiply the    
 
               
 
                      Comment:               result by 1.210.     
 
                    
 
 
 
 
          Serum   10-12-2  = 0.8   0.55-1.           complet
 
          or   017   mg/dL    02                ed     
 
        plasma   14:15                                      
 
  creatin                                         
 
  ine                         
 
  measure          
 
  ment (      
 
              
 
              
 
          
 
          Carbon   10-12-2  = 30   21.0-32           complet
 
          dioxide  017   mmoL/L   .0                ed     
 
           14:15                                      
 
  measure                                         
 
  ment                            
 
                   
 
              
 
        
 
          Serum   10-12-2  = 105               complet
 
          or   017   mmoL/L                     ed     
 
        plasma   14:15                                      
 
  chlorid                                      
 
  e                          
 
  measure          
 
  ment      
 
  (mo         
 
              
 
              
 
          Serum   10-12-2  = 9.5   8.5-10.           complet
 
          or   017   mg/dL    1                 ed     
 
        plasma   14:15                                      
 
  calcium                                        
 
                           
 
  measure          
 
  ment      
 
  (mas        
 
              
 
              
 
        
 
          Serum   10-12-2  = 15   7-18              complet
 
          or   017   mg/dL                      ed     
 
        plasma   14:15                                      
 
  urea                                     
 
  nitroge                        
 
  n           
 
  measure     
 
  men         
 
              
 
              
 
          Serum   10-12-2  = 0.2   0.2-1.0           complet
 
          or   017   mg/dL                      ed     
 
        plasma   14:15                                      
 
  total                                        
 
  bilirub                        
 
  in           
 
  measure     
 
  m           
 
              
 
            
 
          Serum   10-12-2  = 65               complet
 
          or   017   U/L                        ed     
 
        plasma   14:15                                      
 
  alkalin                                      
 
  e                      
 
  phospha          
 
  tase      
 
  yajaira         
 
              
 
              
 
          Serum   10-12-2  = 4.4   3.4-5.0           complet
 
          or   017   gm/dL                      ed     
 
        plasma   14:15                                      
 
  albumin                                       
 
                           
 
  measure          
 
  ment      
 
  (mas        
 
              
 
              
 
        
 
          Serum   10-12-2  = 1.4    1.1-1.8           complet
 
          or   017                              ed     
 
        plasma   14:15                                      
 
  albumin                                  
 
  /globul                     
 
  in mass          
 
   ra         
 
              
 
              
 
 
 
 
 CBC w auto diff (10- 14:15)
 
                 
 
 
 
 
          Automat  10-12-2  = 7.1   7.4-10.           complet
 
          ed   017   fl       4                 ed     
 
        blood   14:15                                      
 
  platele                                       
 
  t mean                      
 
  volume           
 
  yajaira         
 
              
 
              
 
          Mono %   10-12-2  = 6.9 %  1.7-9.3           complet
 
                   017                              ed     
 
                 14:15                                      
 
                                         
 
                      
 
           
 
          Absolut  10-12-2  = 0.4   0.1-1.0           complet
 
          e   017   K/mm3                      ed     
 
        monocyt  14:15                                      
 
  e count                                       
 
                                 
 
                   
 
           
 
          Automat  10-12-2  = 88.2   82.2-97           complet
 
          ed   017   fl       .8                ed     
 
        erythro  14:15                                      
 
  cyte                                         
 
  mean                      
 
  corpusc          
 
  ular v      
 
              
 
              
 
          
 
          Automat  10-12-2  = 34.5   31.8-35           complet
 
          ed   017   g/dl     .4                ed     
 
        erythro  14:15                                      
 
  cyte                                          
 
  mean                        
 
  corpusc          
 
  ular h      
 
              
 
              
 
          
 
          Mean   10-12-2  = 30.4   27-31.2           complet
 
          corpusc  017   pg                         ed     
 
        ular   14:15                                      
 
  hemoglo                                      
 
  bin                      
 
  ([facility])           
 
  determ      
 
              
 
              
 
          
 
          Lymphoc  10-12-2  = 29.4   10-50.0           complet
 
          yte   017   %                          ed     
 
        count,   14:15                                      
 
  blood,                                      
 
  automat                     
 
  ed               
 
              
 
             
 
          Absolut  10-12-2  = 1.9   0.7-4.5           complet
 
          e   017   K/mm3                      ed     
 
        lymphoc  14:15                                      
 
  yte                                        
 
  count                          
 
                   
 
              
 
         
 
          Blood   10-12-2  = 14.4   12.2-16           complet
 
          hemoglo  017   g/dL     .2                ed     
 
        bin   14:15                                      
 
  measure                                         
 
  ment                        
 
  (mass/v          
 
  olum        
 
              
 
              
 
        
 
          Blood   10-12-2  = 41.8   37.0-47           complet
 
          hematoc  017   %        .0                ed     
 
        rit   14:15                                      
 
  (volume                                       
 
                        
 
  fractio          
 
  n)          
 
              
 
             
 
          Granulo  10-12-2  = 62.3   37.0-80           complet
 
          cyte   017   %        .0                ed     
 
        percent  14:15                                      
 
  age                                           
 
                              
 
                   
 
          Blood   10-12-2  = 3.9   1.8-7.8           complet
 
          granulo  017   K/mm3                      ed     
 
        cytes   14:15                                      
 
  automat                                       
 
  ed                         
 
  count           
 
  (numb       
 
              
 
              
 
         
 
          Automat  10-12-2  = 0.8 %  0.1-12.           complet
 
          ed   017            0                 ed     
 
        blood   14:15                                      
 
  eosinop                                     
 
  hils/10                     
 
  0           
 
  leukocy     
 
  t           
 
              
 
            
 
          Automat  10-12-2  = 0.1   0.0-0.4           complet
 
          ed   017   K/mm3                      ed     
 
        blood   14:15                                      
 
  eosinop                                       
 
  hil                         
 
  count            
 
              
 
              
 
         
 
          Baso %   10-12-2  = 0.6 %  0.1-2.0           complet
 
                   017                              ed     
 
                 14:15                                      
 
                                         
 
                      
 
           
 
          Blood   10-12-2  = 6.3   4.8-10.           complet
 
          leukocy  017   K/MM3    8                 ed     
 
        janis   14:15                                      
 
  count                                         
 
  (number                        
 
  /volume          
 
  )           
 
              
 
            
 
          Automat  10-12-2  = 12.2   11.5-17           complet
 
          ed   017   %        .5                ed     
 
        erythro  14:15                                      
 
  cyte                                        
 
  distrib                     
 
  ution           
 
  width       
 
              
 
              
 
         
 
          Red   10-12-2  = 4.74   4.2-5.4           complet
 
          blood   017   M/mm3                      ed     
 
        cell   14:15                                      
 
  count                                         
 
                                 
 
                   
 
         
 
          Blood   10-12-2  = 401   142-424           complet
 
          platele  017   K/mm3                      ed     
 
        t count  14:15                                      
 
                                                
 
                                 
 
                
 
          Automat  10-12-2  = 0.0   0-0.2             complet
 
          ed   017   K/MM3                      ed     
 
        blood   14:15                                      
 
  basophi                                     
 
  l count                        
 
             
 
  (count/     
 
  vo          
 
              
 
             
 
 
 
 
 Serum or plasma 25-hydroxyvitamin D brandan (10- 14:15)
 
                 
 
 
 
 
          Serum   10-12-2  = 6.9   30.0-10           complet
 
          or   017   ng/mL    0.0               ed     
 
        plasma   14:15                                      
 
  25-hydr                                          
 
  oxyvita                        
 
  min D           
 
  brandan        
 
              
 
              
 
        
 
 
 
 
                      Comment:               Vitamin D deficiency 
 
         has been defined by 
 
   the [place] of     
 
              
 
                      Comment:               Medicine and an 
 
         Endocrine Society 
 
   practice guideline as
 
    a                
 
                      Comment:               level of serum 25-OH 
 
         vitamin D less than 
 
   20 ng/mL (1,2).      
 
             
 
                      Comment:               The Endocrine Society
 
          went on to further 
 
   define vitamin D     
 
              
 
                      Comment:               insufficiency as a 
 
         level between 21 and 
 
   29 ng/mL (2).        
 
           
 
                      Comment:               1. IOM ([place] of 
 
         Medicine). 2010. 
 
   Dietary reference    
 
               
 
                      Comment:                  intakes for 
 
         calcium and D. 
 
   [place]: The   
 
                
 
                      Comment:                  [employer]
 
          [employer].              
 
     
 
                      Comment:               2. Lo MF, Dahlia BOURNE, Enoc
 
    FRANCIS, et al.          
 
         
 
                      Comment:                  Evaluation, 
 
         treatment, and 
 
   prevention of vitamin
 
    D                
 
                      Comment:                  deficiency: an 
 
         Endocrine Society 
 
   clinical practice    
 
               
 
                      Comment:                  guideline. JCEM. 
 
         2011; 
 
   96(7):1911-30.       
 
            
 
                      Comment:               Performed at:  [facility] 
 
         [facility]       
 
            
 
                      Comment:               [address], [place]  [phone]
 
                   
 
                      Comment:               : Joseph Dowell PhD, 
 
   Phone:  [phone]   
 
                
 
 
 
 
 Hemoglobin A1c in Blood (10- 14:15)
 
                 
 
 
 
 
          Hemoglo  10-12-2  5.2 %    0.0%   Normal   complet
 
          bin A1c  017            -            ed     
 
         in   14:15             7.0%                   
 
  Blood                                   
 
                              
 
                     
 
         
 
 
 
 
 Streptococcus pyogenes Ag [Presence] in Unspecified specimen 
 
 (2017 02:04)                
 
 
 
 
          Strepto    NEGATIV                    complet
 
          coccus   017   E                          ed     
 
        pyogene  02:04                                      
 
  s Ag                                         
 
  [Presen                             
 
  ce] in             
 
  Unspeci     
 
  fied      
 
  specime     
 
  n           
 
              
 
            
 
 
 
 
 CHLAMYDIA AND GONORRHEA TESTING (2013 11:35)
 
                 
 
 
 
 
          [place]    NEGATIV                    complet
 
          ia   013   E                          ed     
 
        trachom  11:35                                      
 
  atis                                         
 
  rRNA                              
 
  [Presen            
 
  ce] in      
 
  Unspeci     
 
  fied      
 
  specime     
 
  n by      
 
  Probe &     
 
   target     
 
        
 
  amplifi     
 
  cation      
 
  method      
 
              
 
              
 
          
 
          Neisser    NEGATIV                    complet
 
          ia   013   E                          ed     
 
        gonorrh  11:35                                      
 
  oeae                                         
 
  rRNA                              
 
  [Presen            
 
  ce] in      
 
  Unspeci     
 
  fied      
 
  specime     
 
  n by      
 
  Probe &     
 
   target     
 
        
 
  amplifi     
 
  cation      
 
  method      
 
              
 
              
 
          
 
 
 
 
 CHLAMYDIA AND GONORRHEA TESTING (2013 11:35)
 
                 
 
 
 
 
          REMARKS    MOD REP                    complet
 
                   013    TO                     ed     
 
                 11:35    CHANGE                            
 
                                          
 
            FROM                    
 
       96        
 
    TO    
 
    96   
 
     PER    
 
    ARNAV [place] HD   
 
        
 
       
 
    3 TN      
 
              
 
              
 
        
 
 
 
 
 CHLAMYDIA AND GONORRHEA TESTING (2013 11:35)
 
                 
 
 
 
 
          COLLECT    PT/D.                     complet
 
          OR       013   BRADFOR                    ed     
 
                 11:35    D RN                              
 
                                                  
 
                                      
 
               
 
          ETHNICI    WHITE,                     complet
 
          TY       013   NON-HIS                    ed     
 
                 11:35    PANIC                             
 
                                                  
 
                                      
 
                
 
          KIT                       complet
 
          EXPIRAT  013   014                        ed     
 
        ION   11:35                                      
 
  DATE                                             
 
                                      
 
                     
 
        
 
          SYMPTOM    NO                         complet
 
          S        013                              ed     
 
                 11:35                                     
 
                                         
 
                              
 
             
 
          REASON     REVISIT                    complet
 
          FOR   013   /ANNUAL                    ed     
 
        REQUEST  11:35     FAMILY                           
 
                                             
 
                    PLANNIN                   
 
            G VISIT       
 
              
 
              
 
           
 
          SPECIME    URINE                      complet
 
          N   013                              ed     
 
        SOURCE   11:35                                      
 
                                              
 
                                      
 
                 
 
          PREGNAN    NO                         complet
 
          T        013                              ed     
 
                 11:35                                     
 
                                         
 
                              
 
             
 
          CHART     NA                         complet
 
          NUMBER   013                              ed     
 
                 11:35                                     
 
                                           
 
                                  
 
             
 
          [place]lamyd    Pending                    complet
 
          ia   013                              ed     
 
        trachom  11:35                                      
 
  atis                                        
 
  rRNA                              
 
  [Presen            
 
  ce] in      
 
  Unspeci     
 
  fied      
 
  specime     
 
  n by      
 
  Probe &     
 
   target     
 
        
 
  amplifi     
 
  cation      
 
  method      
 
              
 
              
 
          
 
          Neisser    Pending                    complet
 
          ia   013                              ed     
 
        gonorrh  11:35                                      
 
  oeae                                        
 
  rRNA                              
 
  [Presen            
 
  ce] in      
 
  Unspeci     
 
  fied      
 
  specime     
 
  n by      
 
  Probe &     
 
   target     
 
        
 
  amplifi     
 
  cation      
 
  method      
 
              
 
              
 
          
 
 
 
 
 CHLAMYDIA AND GONORRHEA TESTING (10- 16:30)
 
                 
 
 
 
 
          Chlamyd  10-30-2  NEGATIV                    complet
 
          ia   012   E                          ed     
 
        trachom  16:30                                      
 
  atis                                         
 
  rRNA                              
 
  [Presen            
 
  ce] in      
 
  Unspeci     
 
  fied      
 
  specime     
 
  n by      
 
  Probe &     
 
   target     
 
        
 
  amplifi     
 
  cation      
 
  method      
 
              
 
              
 
          
 
          Neisser  10-30-2  NEGATIV                    complet
 
          ia   012   E                          ed     
 
        gonorrh  16:30                                      
 
  oeae                                         
 
  rRNA                              
 
  [Presen            
 
  ce] in      
 
  Unspeci     
 
  fied      
 
  specime     
 
  n by      
 
  Probe &     
 
   target     
 
        
 
  amplifi     
 
  cation      
 
  method      
 
              
 
              
 
          
 
 
 
 
 CHLAMYDIA AND GONORRHEA TESTING (10- 16:30)
 
                 
 
 
 
 
          COLLECT  10-30-2  NA                         complet
 
          OR       012                              ed     
 
                 16:30                                     
 
                                          
 
                              
 
             
 
          ETHNICI  10-30-2  WHITE,                     complet
 
          TY       012   NON-HIS                    ed     
 
                 16:30    PANIC                             
 
                                                  
 
                                      
 
                
 
          KIT   10-30-2  1-31-13                    complet
 
          EXPIRAT  012                              ed     
 
        ION   16:30                                      
 
  DATE                                          
 
                                      
 
                     
 
        
 
          SYMPTOM  10-30-2  NO                         complet
 
          S        012                              ed     
 
                 16:30                                     
 
                                         
 
                              
 
             
 
          REASON   10-30-2  REVISIT                    complet
 
          FOR      /ANNUAL                    ed     
 
        REQUEST  16:30     FAMILY                           
 
                                             
 
                    PLANNIN                   
 
            G VISIT       
 
              
 
              
 
           
 
          SPECIME  10-30-2  URINE                      complet
 
          N   012                              ed     
 
        SOURCE   16:30                                      
 
                                              
 
                                      
 
                 
 
          PREGNAN  10-30-2  NO                         complet
 
          T        012                              ed     
 
                 16:30                                     
 
                                         
 
                              
 
             
 
          CHART   10-30-2  NA                         complet
 
          NUMBER   012                              ed     
 
                 16:30                                     
 
                                           
 
                                  
 
             
 
          Chlamyd  10-30-2  Pending                    complet
 
          ia   012                              ed     
 
        trachom  16:30                                      
 
  atis                                        
 
  rRNA                              
 
  [Presen            
 
  ce] in      
 
  Unspeci     
 
  fied      
 
  specime     
 
  n by      
 
  Probe &     
 
   target     
 
        
 
  amplifi     
 
  cation      
 
  method      
 
              
 
              
 
          
 
          Neisser  10-30-2  Pending                    complet
 
          ia   012                              ed     
 
        gonorrh  16:30                                      
 
  oeae                                        
 
  rRNA                              
 
  [Presen            
 
  ce] in      
 
  Unspeci     
 
  fied      
 
  specime     
 
  n by      
 
  Probe &     
 
   target     
 
        
 
  amplifi     
 
  cation      
 
  method      
 
              
 
              
 
          
 
 
 
 
 CHLAMYDIA AND GONORRHEA TESTING (10- 11:23)
 
                 
 
 
 
 
          COLLECT  10-04-2  NA                         complet
 
          OR       011                              ed     
 
                 11:23                                     
 
                                          
 
                              
 
             
 
          ETHNICI  10-04-2  WHITE,                     complet
 
          TY       011   NON-HIS                    ed     
 
                 11:23    PANIC                             
 
                                                  
 
                                      
 
                
 
          KIT   10-04-2  11-30-1                    complet
 
          EXPIRAT  011   1                          ed     
 
        ION   11:23                                      
 
  DATE                                           
 
                                      
 
                     
 
        
 
          SYMPTOM  10-04-2  NO                         complet
 
          S        011                              ed     
 
                 11:23                                     
 
                                         
 
                              
 
             
 
          REASON   10-04-2  REVISIT                    complet
 
          FOR   011   /ANNUAL                    ed     
 
        REQUEST  11:23     FAMILY                           
 
                                             
 
                    PLANNIN                   
 
            G VISIT       
 
              
 
              
 
           
 
          SPECIME  10-04-2  URINE                      complet
 
          N   011                              ed     
 
        SOURCE   11:23                                      
 
                                              
 
                                      
 
                 
 
          PREGNAN  10-04-2  NO                         complet
 
          T        011                              ed     
 
                 11:23                                     
 
                                         
 
                              
 
             
 
          CHART   10-04-2  NA                         complet
 
          NUMBER   011                              ed     
 
                 11:23                                     
 
                                           
 
                                  
 
             
 
          [place]  10-04-2  NEGATIV                    complet
 
          ia   011   E                          ed     
 
        trachom  11:23                                      
 
  atis                                         
 
  rRNA                              
 
  [Presen            
 
  ce] in      
 
  Unspeci     
 
  fied      
 
  specime     
 
  n by      
 
  Probe &     
 
   target     
 
        
 
  amplifi     
 
  cation      
 
  method      
 
              
 
              
 
          
 
          Neisser  10-04-2  NEGATIV                    complet
 
          ia   011   E                          ed     
 
        gonorrh  11:23                                      
 
  oeae                                         
 
  rRNA                              
 
  [Presen            
 
  ce] in      
 
  Unspeci     
 
  fied      
 
  specime     
 
  n by      
 
  Probe &     
 
   target     
 
        
 
  amplifi     
 
  cation      
 
  method      
 
              
 
              
 
          
 
                                                                                
                                                                                
                                                                                
                                                                                
                                                                                
                                                                                
                                                                                
                                                                                
                                                                                
                                                                                
                                                                                
                           
 
Encounters
                      
 
 
 Encounter  Start   End Date   Code       Location   Performer
 
  Type      Date                                                 
 
                                                        
 
                
 
 [facility] [place]                [facility]            
 
 -   7          7                     [facility]            
 
 [facility]            
 
 -   7          7                     [facility]            
 
 [facility]            
 
 [facility]   7          7                     [facility]            
 
 [facility]            
 
 [facility]   7          7                     [facility]            
 
 [facility]            
 
 -   7          7                     [facility]            
 
 [facility]            
 
 -   5          5                     [facility]            
 
 [facility]            
 
 [facility]   5          5                     [facility]            
 
 [facility]            
 
 -   4          4                     [facility]                [facility]            
 
 -   2          2                     [facility]                [facility]            
 
 -   2          2                     [facility]                [facility]            
 
 -   1          1                     [facility]                [facility]            
 
 -   9          9                     [facility]                [facility]            
 
 -   8          8                     [facility]

## 2017-11-19 NOTE — EXTERNAL MEDICAL SUMMARY RPT
[employer]
 Created on: 2017
 
JAMMIE SIMMONS
External Reference #: [id number]
: 89
Sex: Female
 
Demographics
 
 
 
 Address  [address]  [address]
 
 Preferred Language  English
 
 Marital Status  Unknown
 
 Yazdanism Affiliation  Unknown
 
 Race  Unknown
 
 Ethnic Group  Unknown
 
 
Author
 
 
 
 Author            ,            FLORIN RODRIGUEZLEVI
 
 Address  Unknown
 
 Phone  [email or web address]
 
 
 
Care Team Providers
 
 
 
 Care Team Member Name  Role  Phone
 
 YAHIR CARDOZA, YAHIR CARDOZA   Unavailable  Unavailable
 
 LAZARO EMILY, LAZARO   Unavailable  Unavailable
 
 EMILY   
 
 UMESH MILIND,   Unavailable  Unavailable
 
 UMESH MILIND   
 
 MARK VISION,   Unavailable  Unavailable
 
 MARK VISION   
 
 [facility]   Unavailable  Unavailable
 
 [place], [facility]   
 
 [facility] [place]  
 
    
 
 [facility]   Unavailable  Unavailable
 
 [place]  
 
  [facility] [place]  
 
    
 
 ENEDELIA HANNAH,   Unavailable  Unavailable
 
 ENEDELIA HANNAH   
 
 [facility]   Unavailable  Unavailable
 
 [place], [facility]   Unavailable  Unavailable
 
 CENTER, [facility]   Unavailable  Unavailable
 
 [facility], [facility]   
 
 [facility]   Unavailable  Unavailable
 
 HOSPITAL, [facility],   Unavailable  Unavailable
 
 [facility]   
 
 [facility],  Unavailable  Unavailable
 
  [facility]  
 
    
 
 [facility]   Unavailable  Unavailable
 
 IM[facility]ING [facility], [facility]   
 
 DYLLAN SOM,   Unavailable  Unavailable
 
 DYLLAN SOM   
 
 DYLLAN, SOM B,   Unavailable  Unavailable
 
 DYLLAN, SOM B   
 
 [facility], [facility]   Unavailable  Unavailable
 
 [facility],   Unavailable  Unavailable
 
 [facility]   Unavailable  Unavailable
 
 [facility]   
 
 [facility]   
 
 GRIMALDO DON,   Unavailable  Unavailable
 
 GRIMALDO BOSTON HEWITT R,   Unavailable  Unavailable
 
 GRIMALDOBOSTON KHAN R   
 
 [facility]   Unavailable  Unavailable
 
 [address]   
 
 [facility] [address]   
 
 [facility]   Unavailable  Unavailable
 
 [place]   
 
 [facility]   
 
 KATHLEEN III MESERET,   Unavailable  Unavailable
 
 KATIEMAN III MESERET   
 
                                            
 
Purpose
                      Continuity of Care Document - 2008 through 2017                                                                            
                     
 
Problems
                      
 
 
 Code         Diagnosis    DOS          Provider     Status     
 
                                                               
 
               
 
 J00          ACUTE   2017   [facility]              
 
              NASOPHARYNG               PHYSICIAN              
 
      ITIS COMMON          GROUP                   
 
   COLD                       
 
                  
 
      
 
         CARPAL   2017   [facility]              
 
              TUNNEL                PHYSICIANS              
 
      SYNDROME           GROUP                   
 
  BILATERAL                  
 
  UPPER LIMBS     
 
                
 
            
 
         LESION OF   2017   [facility]              
 
              ULNAR NERVE               PHYSICIANS              
 
       BILATERAL           GROUP                   
 
  UPPER LIMBS                 
 
                  
 
            
 
         ENCOUNTER   2017   [facility]              
 
              FOR                PHYSICIANS              
 
      SURVEILLANC          GROUP                   
 
  E                  
 
  CONTRACEPTI     
 
  VES UNS       
 
                
 
        
 
 J209         ACUTE   2017   [facility]              
 
              BRONCHITIS                PHYSICIANS,             
 
      UNSPECIFIED           PLLC                   
 
                              
 
              
 
 R05          COUGH        2017   [facility]              
 
                                        PHYSICIANS,             
 
                  [facility]                   
 
                  
 
      
 
 R509         FEVER   2017   [place]              
 
              UNSPECIFIED               MEDICAL              
 
                           IMAGING ASS             
 
                          
 
            
 
 J028         ACUTE   2017   [facility]              
 
              PHARYNGITIS               PHYSICIANS              
 
       DUE TO           GROUP                   
 
  OTHER SPEC                  
 
  ORGANISMS       
 
                
 
          
 
 Z111         ENCOUNTER   2017   [facility]              
 
              SCREENING                PHYSICIAN              
 
      FOR           GROUP                   
 
  RESPIRATORY                 
 
        
 
  TUBERCULOSI   
 
  S             
 
             
 
 J029         ACUTE   2017   [facility]              
 
              PH[facility]NGITIS               [facility]              
 
                 INC                     
 
  UNSPECIFIED                
 
                
 
            
 
 J020         STREPTOCOCC  2017   [facility]              
 
              AL                [facility]              
 
      PHARYNGITIS          INC                     
 
                             
 
            
 
 J069         ACUTE UPPER  2016   [facility]              
 
                              PHYSICIANS              
 
      RESPIRATORY          GROUP                   
 
   INFECTION                  
 
  UNSPECIFIED     
 
                
 
            
 
 [id number]       CELLULITIS   2016   [facility]              
 
              OF RIGHT                PHYSICIANS,             
 
      LOWER LIMB            PLLC                   
 
                              
 
             
 
         ENCOUNTER   2016   [facility]              
 
              SURVEILLANC               PHYSICIANS              
 
      E           GROUP                   
 
  INJECTABLE                  
 
  CONTRACEPTI     
 
  VE            
 
              
 
 [id number]       ACUTE   2016   [facility]              
 
              SUPPURATIVE               PHYSICIANS              
 
       OM W/O           GROUP                   
 
  RUPT EAR                  
 
  DRUM UNS      
 
  EAR           
 
               
 
 R102         PELVIC AND   2016   [facility]              
 
              PERINEAL                PHYSICIANS              
 
      PAIN                 GROUP                   
 
                              
 
      
 
         LEFT LOWER   2016   [facility]              
 
              QUADRANT                PHYSICIANS              
 
      PAIN                 GROUP                   
 
                              
 
      
 
         ENCOUNTER   2015   [facility]              
 
              FOR                PHYSICIANS              
 
      SURVEILLANC          GROUP                   
 
  E OTHER                  
 
  CONTRACEPTI     
 
  VES           
 
               
 
         HIGH RISK   2015   [facility]              
 
              HETEROSEXUA               [facility]              
 
      L BEHAVIOR           INC                     
 
                             
 
           
 
 7804         DIZZINESS   2015   [facility]              
 
              AND                PHYSICIANS              
 
      GIDDINESS            GROUP                   
 
                              
 
            
 
 7850         UNSPECIFIED  2015   [facility]              
 
                              PHYSICIANS              
 
      TACHYCARDIA          GROUP                   
 
                              
 
              
 
 85997        CHEST PAIN   2015   [facility]              
 
              UNSPECIFIED               PHYSICIANS              
 
                           GROUP                   
 
                          
 
      
 
 89352        ACUTE   2015   [facility]              
 
              SANGUINOUS                MEMORIAL              
 
      OTITIS           [facility]                
 
  MEDIA                       
 
                     
 
      
 
 5990         URINARY   2015   [facility]              
 
              TRACT                PHYSICIANS              
 
      INFECTION           GROUP                   
 
  SITE NOT                  
 
  SPECIFIED       
 
                
 
          
 
 6268         OTH D/O   2015   [facility]              
 
              MENSTRUATIO               PHYSICIANS              
 
      N&OTH ABN           GROUP                   
 
  BLEED FE                  
 
  GNT TRACT       
 
                
 
          
 
 [id number]        INFLUENZA   2015   [facility]              
 
              [facility]                
 
  A RESP                  
 
  MANIFEST           
 
                
 
         
 
 4660         ACUTE   2015   [facility]              
 
              BRONCHITIS                PHYSICIANS              
 
                           GROUP                   
 
                         
 
      
 
 25243        ABDOMINAL   2014   [facility]              
 
              PAIN RIGHT                PHYSICIANS              
 
      LOWER           GROUP                   
 
  QUADRANT                    
 
                  
 
         
 
 V741         SCREENING   10-   [facility]              
 
              EXAMINATION               PHYSICIANS              
 
       FOR           GROUP                   
 
  PULMONARY                  
 
  TUBERCULOSI     
 
  S             
 
             
 
 67616        SHORTNESS   2014   [facility]              
 
              OF BREATH                 PHYSICIANS              
 
                           GROUP                   
 
                        
 
      
 
 5368         DYSPEPSIA&O  2014   [facility]              
 
              THER SPEC                [facility] DEPT              
 
    DISORDERS           [place]                 
 
  FUNCTION                  
 
  STOMACH           
 
                
 
        
 
 [id number]        NAUSEA   2014   [facility]              
 
              ALONE                     [facility]              
 
                         [place]                 
 
                    
 
        
 
 86148        NAUSEA WITH  2014   [facility]              
 
               VOMITING                 [facility] DEPT              
 
                         [place]                 
 
                        
 
        
 
 38644        PAIN IN   2014   [facility]              
 
              JOINT, SITE               [facility] DEPT              
 
               [place]                 
 
  UNSPECIFIED                 
 
                    
 
            
 
 6253         DYSMENORRHE  2014   [facility]              
 
              A                         [facility] DEPT              
 
                      [place]                 
 
                  
 
        
 
 7840         HEADACHE     2014   [facility]              
 
                                        TH DEPT              
 
                  [place]                 
 
                  
 
        
 
 70664        FEVER   2014   [facility]              
 
              UNSPECIFIED               [facility] DEPT              
 
                         [place]                 
 
                          
 
        
 
 462          ACUTE   2014   [facility]              
 
              PHARYNGITIS               PHYSICIANS              
 
                           GROUP                   
 
                          
 
      
 
 34225        FEVER   2014   [facility]              
 
              PRESENTING                PHYSICIANS              
 
      CONDITIONS           GROUP                   
 
  CLASSIFIED                  
 
  ELSEWHERE       
 
                
 
          
 
         SURVEILLANC  2013   [facility]             
 
              E OT PREV                 HEALTH              
 
    PRSC           CENTER                  
 
  CONTRACEPT                  
 
  METHOD           
 
                
 
       
 
         OTHER   2013   [facility]             
 
              [facility]                 HEALTH              
 
    PROCREATIVE          CENTER                  
 
   MANAGEMENT                 
 
                   
 
            
 
 26134        DIARRHEA     2013   YAHIR SONY               
 
                                                                
 
                                    
 
      
 
 V700         ROUTINE   2013   [facility]              
 
              GENERAL                [facility]                     
 
    MEDICAL                                  
 
  EXAM@HEALTH     
 
   CARE FACL    
 
                
 
           
 
 1110         PITYRIASIS   2013   MUSIC RONDA               
 
              VERSICOLOR                                        
 
                                           
 
             
 
 V255         INSERTION   2012   IVETH DIAZ              
 
              OF                                        
 
    IMPLANTABLE                             
 
   SUBDERMAL      
 
  CONTRACEPTI   
 
  VE            
 
              
 
 2662         OTHER   2012   [facility]             
 
              [facility]                 HEALTH              
 
    DEFICIENCIE          CENTER                  
 
  S                           
 
                
 
 28793        UNSPECIFIED  2012   [facility]             
 
               VIRAL                 [facility]                    
 
    INFECTION                                   
 
  IN CCE &      
 
  UNS SITE      
 
                
 
         
 
 490          BRONCHITIS   2011   [facility]             
 
              NOT                 [facility]                    
 
    SPECIFIED                                   
 
  AS ACUTE OR     
 
   CHRONIC      
 
                
 
         
 
 75520        ASTHMA   2011   [facility] III             
 
              UNSPECIFIED                [facility]                    
 
     WITH                                   
 
  EXACERBATIO     
 
  N             
 
             
 
 53632        ACUTE   2011   [facility] III             
 
              BRONCHOSPAS                [facility]                    
 
    M                                            
 
               
 
 14683        OTHER   2011   [facility]              
 
              DYSPNEA AND               MILIND                     
 
                                      
 
  RESPIRATORY     
 
      
 
  ABNORMALITI   
 
  ES            
 
              
 
 4659         ACUTE URIS   10-   [facility]              
 
              OF                DON                     
 
    UNSPECIFIED                                 
 
   SITE           
 
                
 
      
 
 48925        OTHER   2011   [facility]              
 
              SPECIFIED                DON                     
 
    DISORDERS                                  
 
  OF URINARY      
 
  TRACT         
 
                
 
      
 
 5589         OTH&UNSPEC   2011   [facility]              
 
              NONINFECTIO               DON                     
 
    US                                  
 
  GASTROENTER     
 
  ITIS&COLITI   
 
  S             
 
             
 
         OT GENERAL  2011   [facility]             
 
                               HEALTH              
 
    CNSL&ADVICE          CENTER                  
 
   CONTRACEPT                 
 
   MANAGEMENT      
 
                
 
            
 
 3670         HYPERMETROP  2011   [facility]              
 
              IA                        VISION                  
 
                                               
 
         
 
 4778         ALLERGIC   2011   [facility]              
 
              RHINITIS                SOM                     
 
    DUE TO                                  
 
  OTHER      
 
  ALLERGEN      
 
                
 
         
 
 7862         COUGH        2011   [facility]              
 
                                        SOM                     
 
                                      
 
      
 
 89254        EXTRINSIC   2011   [facility]              
 
              ASTHMA,                SOM                     
 
    WITH                                  
 
  EXACERBATIO     
 
  N             
 
             
 
         GENERAL   10-   [facility]             
 
              CNSL                 HEALTH              
 
    INITIATION           CENTER                  
 
  OT                  
 
  CONTRACEPT       
 
  MEASURES      
 
                
 
         
 
         PREGNANCY   10-   [facility]             
 
              EXAMINATION                HEALTH              
 
     OR TEST           CENTER                  
 
  NEGATIVE                  
 
  RESULT           
 
                
 
       
 
 7881         DYSURIA      2010   [facility]              
 
                                        DON                     
 
                                        
 
      
 
 3829         UNSPECIFIED  2010   [facility],              
 
               OTITIS                DON R                   
 
    MEDIA                                        
 
                    
 
      
 
 77022        OTHER   2010   [facility],              
 
              CHRONIC                SOM B                   
 
    ALLERGIC                                   
 
  CONJUNCTIVI       
 
  TIS           
 
               
 
 16954        ESOPHAGEAL   2010   [facility],              
 
              REFLUX                    SOM B                   
 
                                                 
 
           
 
 4779         ALLERGIC   2009   [facility],              
 
              RHINITIS                DON R                   
 
    CAUSE                                   
 
  UNSPECIFIED       
 
                
 
            
 
 6262         EXCESSIVE   2008   [facility],              
 
              OR FREQUENT               DON R                   
 
                                       
 
  MENSTRUATIO       
 
  N             
 
             
 
 7831         ABNORMAL   2008   [facility]              
 
              WEIGHT GAIN               [facility]              
 
                         INC                     
 
                         
 
 V069         NEED PROPH   2008   [facility]              
 
              VACCINATION               HEALTH              
 
     W/UNSPEC           CENTRAL              
 
  COMB    BANK ACCT     
 
  VACCINE                   
 
                      
 
        
 
 0340         STREPTOCOCC  2008   [facility]             
 
              [facility]                 [facility]             
 
    THROAT                                       
 
                          
 
       
 
 7821         RASH AND   2008   [facility],              
 
              OTHER                DON R                   
 
    NONSPECIFIC                                  
 
   SKIN        
 
  ERUPTION      
 
                
 
         
 
 6828         CELLULITIS   2008   [facility],              
 
              AND ABSCESS               DON R                   
 
     OF OTHER                                   
 
  SPECIFIED        
 
  SITE          
 
                
 
                                                                                
                                                                                
                                                                                
                                                                                
                                                                                
                                                                                
                                                                                
                                                                                
                                                                                
                                                                               
 
Medications
                      
 
 
 Na  ND  Rx  Da  Fi  Fi  Am  Da  Di  Ph  RX  Ph  St
 
 me  C   No  te  ll  ll  ou  ys  ag  ar   #  ys  at
 
         rm        s   nt      no  ma      ic  us
 
             Or  Da              si  cy      ia    
 
             de  te              s           n     
 
             re                                    
 
             d                                     
 
                                                   
 
                                                   
 
                                                   
 
                                                  
 
                                   
 
                           
 
                      
 
               
 
              
 
 VI  65      10  11      4.  28          00  [place]  Ac
 
 T   38      -1  -1      00              00  L-  ti
 
 D2  00      2-  7-      0               07  MA  ve
 
    73      20  20                      51  RT    
 
 1.  70      17  17                      50       
 
 25  6                                   83  PH    
 
                                            AR    
 
 MG                                          MA    
 
                                            CY    
 
 (5                                              
 
 0,                                    #5    
 
 00                                   91 
 
 0                                     
 
 UN                                    
 
 IT                             
 
 )                         
 
                           
 
                           
 
                  
 
                  
 
                  
 
               
 
               
 
               
 
              
 
 SV  81      10  11      30  30          00  [place]  Ac
 
    13      -1  -1      .0              00  L-  ti
 
 VI  10      2-  7-      00              08  MA  ve
 
 TA  31      20  20                      84  RT    
 
 MI  27      17  17                      15       
 
 N   1                                   82  PH    
 
 D3                                          AR    
 
                                            MA    
 
 5,                                          CY    
 
 00                                              
 
 0                                     #5    
 
 UN                                    91 
 
 IT                                    
 
                                      
 
 SF                             
 
 TG                        
 
 L                         
 
                           
 
                  
 
                  
 
                  
 
               
 
               
 
               
 
               
 
              
 
 NA  65      10  11      60  30          00  [place]  Ac
 
 CO  16      -1  -1      .0              00  L-  ti
 
 OX  20      2-  7-      00              07  MA  ve
 
 EN  19      20  20                      51  RT    
 
    01      17  17                      50       
 
 50  1                                   84  PH    
 
 0                                           AR    
 
 MG                                          MA    
 
                                            CY    
 
 TA                                              
 
 BL                                    #5    
 
 ET                                    91 
 
                                       
 
                                       
 
                                
 
                           
 
                           
 
                           
 
                  
 
                  
 
                  
 
 [place]  60      10  11      5.  7           00  [place]  Ac
 
 LF  75      -1  -1      00              00  L-  ti
 
 AC  80      6-  7-      0               07  MA  ve
 
 ET  01      20  20                      51  RT    
 
 AM  80      17  17                      56       
 
 ID  5                                   63  PH    
 
 E                                           AR    
 
 10                                          MA    
 
 %                                           CY    
 
 EY                                             
 
 E                                     #5    
 
 DR                                   91 
 
 OP                                    
 
 S                                     
 
                                
 
                           
 
                           
 
                           
 
                  
 
                  
 
                  
 
               
 
              
 
 AZ  50      10  11      6.  5           00  [place]  Ac
 
 IT  11      -1  -1      00              00  L-  ti
 
 HR  10      6-  7-      0               07  MA  ve
 
 OM  78      20  20                      51  RT    
 
 YC  75      17  17                      56       
 
 IN  1                                   62  PH    
 
                                            AR    
 
 25                                          MA    
 
 0                                           CY    
 
 MG                                             
 
                                      #5    
 
 TA                                   91 
 
 BL                                    
 
 ET                                    
 
                                
 
                           
 
                           
 
                           
 
                  
 
                  
 
                  
 
               
 
               
 
 LO  00      09  11      30  30          00  [place]  Ac
 
 RA  78      -2  -0      .0              00  L-  ti
 
 TA  15      9-  3-      00              08  MA  ve
 
 DI  07      20  20                      84  RT    
 
 NE  70      17  17                      13       
 
    1                                   82  PH    
 
 10                                          AR    
 
                                            MA    
 
 MG                                          CY    
 
                                                
 
 TA                                    #5    
 
 BL                                    91 
 
 ET                                    
 
                                       
 
                                
 
                           
 
                           
 
                           
 
                  
 
                  
 
                  
 
               
 
 CO  00      09  11      20  10          00  [place]  Ac
 
 OM  60      -2  -0      0.              00  L-  ti
 
 ET  31      9-  3-      00              07  MA  ve
 
 HA  58      20  20      0               51  RT    
 
 ZI  65      17  17                      26       
 
 NE  8                                   76  PH    
 
 -D                                          AR    
 
 M                                           MA    
 
 SY                                          CY    
 
 RU                                              
 
 P                                     #5    
 
                                       91 
 
                                         
 
                                       
 
                                
 
                           
 
                           
 
                           
 
                  
 
                 
 
               
 
 ME  59      08  09      1.  90          00  CL  Ac
 
 DR  76      -1  -2      00              00  IN  ti
 
 OX  24      8-  2-      0               00  IC  ve
 
 YP  53      20  20                      40       
 
 RO  80      17  17                      75  PH    
 
 GE  2                                   72  AR    
 
 ST                                          MA    
 
 ER                                          CY    
 
 ON                                                
 
 E                                                
 
 15                                          
 
 0                                       
 
 MG                                    
 
 /M                                    
 
 L                              
 
                           
 
                           
 
                        
 
               
 
               
 
               
 
               
 
               
 
              
 
 BE  68      08  09      15  5           00  [facility]
 
 [place]  38      -0  -0      .0              00  L-  ti
 
 ON  20      3-  8-      00              07  MA  ve
 
 AT  24      20  20                      50  RT    
 
 AT  70      17  17                      20       
 
 E   1                                   58  PH    
 
 10                                          AR    
 
 0                                           MA    
 
 MG                                          CY    
 
                                               
 
 CA                                    #5    
 
 PS                                    91 
 
 UL                                    
 
 E                                     
 
                                
 
                           
 
                           
 
                           
 
                  
 
                  
 
                  
 
               
 
              
 
 AZ  59      08  09      4.  4           00  [place]  Ac
 
 IT  76      -0  -0      00              00  L-  ti
 
 HR  23      3-  8-      0               07  MA  ve
 
 OM  06      20  20                      50  RT    
 
 YC  00      17  17                      20       
 
 IN  2                                   59  PH    
 
                                            AR    
 
 25                                          MA    
 
 0                                           CY    
 
 MG                                             
 
                                      #5    
 
 TA                                   91 
 
 BL                                    
 
 ET                                    
 
                                
 
                           
 
                           
 
                           
 
                  
 
                  
 
                  
 
               
 
               
 
 BR  60      07  08      40  7           00  [place]  Ac
 
 OM  43      -2  -2      0.              00  L-  ti
 
 PH  20      6-  5-      00              07  MA  ve
 
 EN  27      20  20      0               50  RT    
 
 IR  51      17  17                      08       
 
 -P  6                                   19  PH    
 
 SE                                          AR    
 
 UD                                          MA    
 
 OE                                          CY    
 
 PH                                             
 
 ED                                    #5    
 
 -D                                    91 
 
 M                                       
 
 SY                                    
 
 R                              
 
                           
 
                           
 
                           
 
                  
 
                  
 
                  
 
               
 
               
 
              
 
 NA  65      07  08      28  14          00  [place]  Ac
 
 CO  16      -1  -1      .0              00  L-  ti
 
 OX  20      8-  8-      00              07  MA  ve
 
 EN  19      20  20                      49  RT    
 
    01      17  17                      93       
 
 50  1                                   43  PH    
 
 0                                           AR    
 
 MG                                          MA    
 
                                            CY    
 
 TA                                              
 
 BL                                    #5    
 
 ET                                    91 
 
                                       
 
                                       
 
                                
 
                           
 
                           
 
                           
 
                  
 
                  
 
                  
 
 ME  59      05  06      1.  90          00  CL  Ac
 
 DR  76      -2  -2      00              00  IN  ti
 
 OX  24      4-  3-      0               00  IC  ve
 
 YP  53      20  20                      40       
 
 RO  80      17  17                      75  PH    
 
 GE  2                                   72  AR    
 
 ST                                          MA    
 
 ER                                          CY    
 
 ON                                                
 
 E                                                
 
 15                                          
 
 0                                       
 
 MG                                    
 
 /M                                    
 
 L                              
 
                           
 
                           
 
                        
 
               
 
               
 
               
 
               
 
               
 
              
 
 BR  60      05  06      15  3           00  WA  Ac
 
 OM  43      -1  -1      0.              00  L-  ti
 
 PH  20      1-  6-      00              07  MA  ve
 
 EN  27      20  20      0               48  RT    
 
 IR  51      17  17                      74       
 
 -P  6                                   48  PH    
 
 SE                                          AR    
 
 UD                                          MA    
 
 OE                                          CY    
 
 PH                                             
 
 ED                                    #5    
 
 -D                                    91 
 
 M                                       
 
 SY                                    
 
 R                              
 
                           
 
                           
 
                           
 
                  
 
                  
 
                  
 
               
 
               
 
              
 
 VE  00      05  06      18  17          00  [place]  Ac
 
 NT  17      -1  -1      .0              00  L-  ti
 
 OL  30      1-  6-      00              07  MA  ve
 
 IN  68      20  20                      48  RT    
 
    22      17  17                      74       
 
 HF  0                                   50  PH    
 
 A                                           AR    
 
 90                                          MA    
 
                                            CY    
 
 MC                                              
 
 G                                     #5    
 
 IN                                    91 
 
 [place]                                    
 
 LE                                    
 
 R                              
 
                           
 
                           
 
                           
 
                  
 
                  
 
                  
 
               
 
               
 
              
 
 CE  68      05  06      20  10          00  [place]  Ac
 
 FD  18      -1  -1      .0              00  L-  ti
 
 IN  00      2-  6-      00              07  MA  ve
 
 IR  71      20  20                      48  RT    
 
    16      17  17                      76       
 
 30  0                                   54  PH    
 
 0                                           AR    
 
 MG                                          MA    
 
                                            CY    
 
 CA                                              
 
 PS                                    #5    
 
 UL                                    91 
 
 E                                     
 
                                       
 
                                
 
                           
 
                           
 
                           
 
                  
 
                  
 
                  
 
              
 
 AZ  59      05  06      6.  5           00  [place]  Ac
 
 IT  76      -1  -1      00              00  L-  ti
 
 HR  23      6-  6-      0               07  MA  ve
 
 OM  06      20  20                      48  RT    
 
 YC  00      17  17                      81       
 
 IN  1                                   07  PH    
 
                                            AR    
 
 25                                          MA    
 
 0                                           CY    
 
 MG                                             
 
                                      #5    
 
 TA                                   91 
 
 BL                                    
 
 ET                                    
 
                                
 
                           
 
                           
 
                           
 
                  
 
                  
 
                  
 
               
 
               
 
 BE  68      05  06      15  5           00  [place]  Ac
 
 [place]  38      -1  -1      .0              00  L-  ti
 
 ON  20      6-  6-      00              07  MA  ve
 
 AT  24      20  20                      48  RT    
 
 AT  70      17  17                      81       
 
 E   1                                   08  PH    
 
 10                                          AR    
 
 0                                           MA    
 
 MG                                          CY    
 
                                               
 
 CA                                    #5    
 
 PS                                    91 
 
 UL                                    
 
 E                                     
 
                                
 
                           
 
                           
 
                           
 
                  
 
                  
 
                  
 
               
 
              
 
 ME  59      03  03      1.  90          00  CL  Ac
 
 DR  76      -0  -3      00              00  IN  ti
 
 OX  24      1-  1-      0               00  IC  ve
 
 YP  53      20  20                      40       
 
 RO  80      17  17                      75  PH    
 
 GE  2                                   72  AR    
 
 ST                                          MA    
 
 ER                                          CY    
 
 ON                                                
 
 E                                                
 
 15                                          
 
 0                                       
 
 MG                                    
 
 /M                                    
 
 L                              
 
                           
 
                           
 
                        
 
               
 
               
 
               
 
               
 
               
 
              
 
 BR  60      12  01      20  4           00  [place]  Ac
 
 OM  43      -1  -1      0.              00  L-  ti
 
 PH  20      4-  3-      00              07  MA  ve
 
 EN  27      20  20      0               45  RT    
 
 IR  51      16  17                      85       
 
 -P  6                                   18  PH    
 
 SE                                          AR    
 
 UD                                          MA    
 
 OE                                          CY    
 
 PH                                             
 
 ED                                    #5    
 
 -D                                    91 
 
 M                                       
 
 SY                                    
 
 R                              
 
                           
 
                           
 
                           
 
                  
 
                  
 
                  
 
               
 
               
 
              
 
 ME  59      12  01      1.  90          00  CL  Ac
 
 DR  76      -0  -0      00              00  IN  ti
 
 OX  24      7-  9-      0               00  IC  ve
 
 YP  53      20  20                      40       
 
 RO  80      16  17                      75  PH    
 
 GE  2                                   72  AR    
 
 ST                                          MA    
 
 ER                                          CY    
 
 ON                                                
 
 E                                                
 
 15                                          
 
 0                                       
 
 MG                                    
 
 /M                                    
 
 L                              
 
                           
 
                           
 
                        
 
               
 
               
 
               
 
               
 
               
 
              
 
 FL  00      10  10  1   16  30      EA  24  ST  Ac
 
 UT  05      -1  -1      .0          ST  57  EP  ti
 
 IC  43      9-  9-      00          SI  37  HE  ve
 
 AS  27      20  20                  DE      NS    
 
 ON  09      11  11                              
 
 E   9                               PH      DO    
 
 CO                                  AR      N     
 
 OP                                  MA      R     
 
                                    CY            
 
 50                                             
 
                           OF            
 
 MC                                   
 
 G                       CY      
 
 SP                      NT      
 
 RA               HI      
 
 Y              AN      
 
                A      
 
                     
 
                  
 
                  
 
                  
 
                  
 
                  
 
                  
 
                 
 
              
 
 BR  60      10  10  1   12  3       EA  24  ST  Ac
 
 OM  43      -1  -1      0.          ST  57  EP  ti
 
 FE  20      9  9-      00          SI  38  HE  ve
 
 D   83      20  20      0           DE      NS    
 
 DM  71      11  11                              
 
    6                               PH      DO    
 
 CO                                  AR      N     
 
 UG                                  MA      R     
 
 H                                   CY            
 
 SY                                            
 
 RU                         OF            
 
 P                                    
 
                           CY      
 
                         NT      
 
                  HI      
 
                AN      
 
                A      
 
                     
 
                  
 
                  
 
                 
 
               
 
               
 
               
 
               
 
              
 
 CO  00      06  06  0   16  3       EA  23  ST  Ac
 
 OM  78      -2  -2      .0          ST  09  EP  ti
 
 ET  11      7  7          SI  79  HE  ve
 
 HA  83      20  20                  DE      NS    
 
 ZI  01      11  11                              
 
 NE  0                               PH      DO    
 
                                    AR      N     
 
 25                                  MA      R     
 
                                    CY            
 
 MG                                            
 
                           OF            
 
 TA                                   
 
 BL                      CY      
 
 ET                      NT      
 
                  HI      
 
                AN      
 
                A      
 
                     
 
                  
 
                  
 
                  
 
                  
 
                  
 
               
 
               
 
              
 
 [place]  50      06  06  0   12  6       EA  23  ST  Ac
 
 LF  38      -2  -2      0.          ST  09  EP  ti
 
 AM  30      7  7      00          SI  80  HE  ve
 
 ET  82      20  20      0           DE      NS    
 
 HO  31      11  11                              
 
 XA  6                               PH      DO    
 
 ZO                                  AR      N     
 
 LE                                  MA      R     
 
 -T                                  CY            
 
 MP                                            
 
                           OF            
 
 [place]                                   
 
 SP                        CY      
 
                         NT      
 
                  HI      
 
                AN      
 
                A      
 
                     
 
                  
 
                  
 
                  
 
                  
 
               
 
               
 
               
 
              
 
 AM  00      05  05  0   30  10      EA  22  ST  Ac
 
 OX  78      -2  -2      .0          ST  65  EP  ti
 
 IC  12      4-  4      00          SI  59  HE  ve
 
 IL  02      20  20                  DE      NS    
 
 LI  00      11  11                              
 
 N   5                               PH      DO    
 
 25                                  AR      N     
 
 0                                   MA      R     
 
 MG                                  CY            
 
                                               
 
 CA                         OF            
 
 PS                                   
 
 UL                      CY      
 
 E                       NT      
 
                  HI      
 
                AN      
 
                A      
 
                     
 
                  
 
                  
 
                  
 
                  
 
                 
 
               
 
               
 
              
 
 CO  00      02  02  0   12  4       EA  21  ST  Ac
 
 OM  78      -2  -2      .0          ST  40  EP  ti
 
 ET  11      5-  5          SI  90  HE  ve
 
 HA  83      20  20                  DE      NS    
 
 ZI  01      11  11                              
 
 NE  0                               PH      DO    
 
                                    AR      N     
 
 25                                  MA      R     
 
                                    CY            
 
 MG                                            
 
                           OF            
 
 TA                                   
 
 BL                      CY      
 
 ET                      NT      
 
                  HI      
 
                AN      
 
                A      
 
                     
 
                  
 
                  
 
                  
 
                  
 
                  
 
               
 
               
 
              
 
     00      01  02  6   30  30      EA  20  CO  Ac
 
     00      -1  -2      .0          ST  86  MM  ti
 
     60      8-  4-      00          SI  10  UN  ve
 
     11      20  20                  DE      IT    
 
     73      11  11                         Y     
 
     1                               PH      AL    
 
                                     AR      LE    
 
                                     MA      RG    
 
                                     CY      Y     
 
                                          &     
 
                            OF      AS    
 
                                   TH 
 
                         CY   MA 
 
                         NT     
 
                  HI   PS 
 
                AN   C  
 
                A       
 
                        
 
                  
 
                  
 
                  
 
                  
 
                  
 
                  
 
                 
 
              
 
     00      01  02  6   30  30      EA  20  MA  Ac
 
     00      -1  -2      .0          ST  86  SH  ti
 
     60      8-  4-      00          SI  10  BU  ve
 
     11      20  20                  DE      RN    
 
     73      11  11                              
 
     1                               PH      AM    
 
                                     AR      Y     
 
                                     MA      B     
 
                                     CY            
 
                                                
 
                            OF            
 
                                      
 
                         CY      
 
                         NT      
 
                  HI      
 
                AN      
 
                A      
 
                     
 
               
 
               
 
               
 
               
 
               
 
               
 
               
 
              
 
 LO  45      01  02  6   30  30      EA  20  MA  Ac
 
 RA  80      -1  -2      .0          ST  86  SH  ti
 
 TA  20      8-  4-      00          SI  08  BU  ve
 
 DI  65      20  20                  DE      RN    
 
 NE  08      11  11                              
 
    7                               PH      AM    
 
 10                                  AR      Y     
 
                                    MA      B     
 
 MG                                  CY            
 
                                               
 
 TA                         OF            
 
 BL                                   
 
 ET                      CY      
 
                         NT      
 
                  HI      
 
                AN      
 
                A      
 
                     
 
                  
 
                  
 
                  
 
                  
 
               
 
               
 
               
 
              
 
 AS  00      02  02  6   0.  15      EA  21  MA  Ac
 
 MA  08      -1  -1      24          ST  29  SH  ti
 
 NE  51      7-  7-      0           SI  45  BU  ve
 
 X   34      20  20                  DE      RN    
 
 TW  10      11  11                              
 
 IS  2                               PH      AM    
 
 TH                                  AR      Y     
 
 AL                                  MA      B     
 
 ER                                  CY            
 
                                               
 
 22                         OF            
 
 0                                   
 
 MC                      CY      
 
 G                       NT      
 
 #6               HI      
 
 0              AN      
 
                A      
 
                     
 
                  
 
                  
 
                  
 
                  
 
                  
 
                  
 
                 
 
              
 
 MA  51      02  02  0   59  1       EA  21  ST  Ac
 
 LA  67      -1  -1      .0          ST  26  EP  ti
 
 TH  25      6  6      00          SI  49  HE  ve
 
 IO  27      20  20                  DE      NS    
 
 N   70      11  11                              
 
 0.  4                               PH      DO    
 
 5%                                  AR      N     
 
                                    MA      R     
 
 LO                                  CY            
 
 TI                                            
 
 ON                         OF            
 
                                      
 
                         CY      
 
                         NT      
 
                  HI      
 
                AN      
 
                A      
 
                     
 
                  
 
                  
 
               
 
               
 
               
 
               
 
               
 
              
 
 CO  37      01  01  6   30  30      EA  20  MA  Ac
 
 IL  00      -1  -1      .0          ST  86  SH  ti
 
 OS  00      8  8      00          SI  05  BU  ve
 
 EC  45      20  20                  DE      RN    
 
    50      11  11                              
 
 OT  2                               PH      AM    
 
 C                                   AR      Y     
 
 20                                  MA      B     
 
 .6                                  CY            
 
                                               
 
 MG                         OF            
 
                                     
 
 TA                      CY      
 
 BL                      NT      
 
 ET               HI      
 
                AN      
 
                A      
 
                     
 
                  
 
                  
 
                  
 
                  
 
                  
 
                  
 
               
 
              
 
 [place]  00      01  01  3   30  4       EA  20  MA  Ac
 
 ST  16      -1  -1      .0          ST  86  SH  ti
 
 AT  80      8-  8-      00          SI  06  BU  ve
 
 IN  00      20  20                  DE      RN    
 
    73      11  11                              
 
 10  0                               PH      AM    
 
 0,                                  AR      Y     
 
 00                                  MA      B     
 
 0                                   CY            
 
 UN                                            
 
 IT                         OF            
 
 S/                                   
 
 GM                      CY      
 
                        NT      
 
 OI               HI      
 
 NT             AN      
 
                A      
 
                     
 
                  
 
                  
 
                  
 
                  
 
                  
 
                  
 
                  
 
              
 
 NA  00      01  01  6   17  30      EA  20  MA  Ac
 
 SO  08      -1  -1      .0          ST  86  SH  ti
 
 NE  51      8-  8-      00          SI  07  BU  ve
 
 X   28      20  20                  DE      RN    
 
 50  80      11  11                              
 
    1                               PH      AM    
 
 MC                                  AR      Y     
 
 G                                   MA      B     
 
 NA                                  CY            
 
 SA                                             
 
 L                          OF            
 
 SP                                   
 
 RA                      CY      
 
 Y                       NT      
 
                  HI      
 
                AN      
 
                A      
 
                     
 
                  
 
                  
 
                  
 
                  
 
                 
 
               
 
               
 
              
 
 LO  45      01  01  6   30  30      EA  20  MA  Ac
 
 RA  80      -1  -1      .0          ST  86  SH  ti
 
 TA  20      8-  8-      00          SI  08  BU  ve
 
 DI  65      20  20                  DE      RN    
 
 NE  08      11  11                              
 
    7                               PH      AM    
 
 10                                  AR      Y     
 
                                    MA      B     
 
 MG                                  CY            
 
                                               
 
 TA                         OF            
 
 BL                                   
 
 ET                      CY      
 
                         NT      
 
                  HI      
 
                AN      
 
                A      
 
                     
 
                  
 
                  
 
                  
 
                  
 
               
 
               
 
               
 
              
 
 VE  00      01  01  3   18  18      EA  20  MA  Ac
 
 NT  17      -1  -1      .0          ST  86  SH  ti
 
 OL  30      8-  8-      00          SI  09  BU  ve
 
 IN  68      20  20                  DE      RN    
 
    22      11  11                              
 
 HF  0                               PH      AM    
 
 A                                   AR      Y     
 
 90                                  MA      B     
 
                                    CY            
 
 MC                                             
 
 G                          OF            
 
 IN                                   
 
 HA                      CY      
 
 LE                      NT      
 
 R                HI      
 
                AN      
 
                A      
 
                     
 
                  
 
                  
 
                  
 
                  
 
                  
 
                 
 
               
 
              
 
     00      01  01  6   30  30      EA  20  CO  Ac
 
     00      -1  -1      .0          ST  86  MM  ti
 
     60      8-  8      00          SI  10  UN  ve
 
     11      20  20                  DE      IT    
 
     73      11  11                         Y     
 
     1                               PH      AL    
 
                                     AR      LE    
 
                                     MA      RG    
 
                                     CY      Y     
 
                                          &     
 
                            OF      AS    
 
                                   TH 
 
                         CY   MA 
 
                         NT     
 
                  HI   PS 
 
                AN   C  
 
                A       
 
                        
 
                  
 
                  
 
                  
 
                  
 
                  
 
                  
 
                 
 
              
 
     00      01  01  6   30  30      EA  20  MA  Ac
 
     00      -1  -1      .0          ST  86  SH  ti
 
     60      8-  8      00          SI  10  BU  ve
 
     11      20  20                  DE      RN    
 
     73      11  11                              
 
     1                               PH      AM    
 
                                     AR      Y     
 
                                     MA      B     
 
                                     CY            
 
                                                
 
                            OF            
 
                                      
 
                         CY      
 
                         NT      
 
                  HI      
 
                AN      
 
                A      
 
                     
 
               
 
               
 
               
 
               
 
               
 
               
 
               
 
              
 
 LO  45      11  11  0   30  30      EA  19  ST  Ac
 
 RA  80      -0  -0      .0          ST  89  EP  ti
 
 TA  20      9-  9-      00          SI  81  HE  ve
 
 DI  65      20  20                  DE      NS    
 
 NE  08      10  10                              
 
    7                               PH      DO    
 
 10                                  AR      N     
 
                                    MA      R     
 
 MG                                  CY            
 
                                               
 
 TA                         OF            
 
 BL                                   
 
 ET                      CY      
 
                         NT      
 
                  HI      
 
                AN      
 
                A      
 
                     
 
                  
 
                  
 
                  
 
                  
 
               
 
               
 
               
 
              
 
 AZ  00      11  11  0   6.  6       EA  19  ST  Ac
 
 IT  09      -0  -0      00          ST  89  EP  ti
 
 HR  37      9-  9-      0           SI  82  HE  ve
 
 OM  14      20  20                  DE      NS    
 
 YC  61      10  10                              
 
 IN  8                               PH      DO    
 
                                    AR      N     
 
 25                                  MA      R     
 
 0                                   CY            
 
 MG                                            
 
                           OF            
 
 TA                                  
 
 BL                      CY      
 
 ET                      NT      
 
                  HI      
 
                AN      
 
                A      
 
                     
 
                  
 
                  
 
                  
 
                  
 
                  
 
               
 
               
 
              
 
 [place]  53      09  09  0   14  7       EA  19  ST  Ac
 
 LF  74      -2  -2      .0          ST  29  EP  ti
 
 AM  60      5-  5-      00          SI  06  HE  ve
 
 ET  27      20  20                  DE      NS    
 
 HO  20      10  10                              
 
 XA  5                               PH      DO    
 
 ZO                                  AR      N     
 
 LE                                  MA      R     
 
 -T                                  CY            
 
 MP                                            
 
                           OF            
 
 DS                                   
 
                        CY      
 
 TA                      NT      
 
 BL               HI      
 
 ET             AN      
 
                A      
 
                     
 
                  
 
                  
 
                  
 
                  
 
                  
 
                  
 
                  
 
              
 
     00      09  09  0   6.  3       EA  19  RU  Ac
 
     59      -2  -2      00          ST  25  SH  ti
 
     10      2-  2-      0           SI  63     ve
 
     50      20  20                  DE      NE    
 
     20      10  10                         IL    
 
     1                               PH       C    
 
                                     AR            
 
                                     MA            
 
                                     CY            
 
                                               
 
                            OF            
 
                                     
 
                         CY      
 
                         NT      
 
                  HI      
 
                AN   
 
                A    
 
                     
 
               
 
               
 
               
 
               
 
               
 
               
 
               
 
              
 
 [place]  00      08  08  0   6.  6       EA  18  ST  Ac
 
 IT  09      -2  -2      00          ST  78  EP  ti
 
 HR  37      0-  0-      0           SI  86  HE  ve
 
 OM  14      20  20                  DE      NS    
 
 YC  61      10  10                              
 
 IN  8                               PH      DO    
 
                                    AR      N     
 
 25                                  MA      R     
 
 0                                   CY            
 
 MG                                            
 
                           OF            
 
 TA                                  
 
 BL                      CY      
 
 ET                      NT      
 
                  HI      
 
                AN      
 
                A      
 
                     
 
                  
 
                  
 
                  
 
                  
 
                  
 
               
 
               
 
              
 
 SE  45      03  07  3   12  15      EA  16  ST  Ac
 
 LE  80      -2  -2      0.          ST  91  EP  ti
 
 NI  20      4-  6-      00          SI  94  HE  ve
 
 UM  04      20  20      0           DE      NS    
 
    06      10  10                              
 
 [place]  4                               PH      DO    
 
 LF                                  AR      N     
 
 ID                                  MA      R     
 
 E                                   CY            
 
 2.                                             
 
 5%                         OF            
 
                                     
 
 LO                        CY      
 
 TI                      NT      
 
 ON               HI      
 
                AN      
 
                A      
 
                     
 
                  
 
                  
 
                  
 
                  
 
                  
 
                  
 
               
 
              
 
 NA  00      03  03  0   17  30      EA  16  MA  Ac
 
 SO  08      -2  -2      .0          ST  96  SH  ti
 
 NE  51      7-  7-      00          SI  78  BU  ve
 
 X   28      20  20                  DE      RN    
 
 50  80      10  10                              
 
    1                               PH      AM    
 
 MC                                  AR      Y     
 
 G                                   MA      B     
 
 NA                                  CY            
 
 SA                                             
 
 L                          OF            
 
 SP                                   
 
 RA                      CY      
 
 Y                       NT      
 
                  HI      
 
                AN      
 
                A      
 
                     
 
                  
 
                  
 
                  
 
                  
 
                 
 
               
 
               
 
              
 
 LO  45      03  03  0   30  30      EA  16  MA  Ac
 
 RA  80      -2  -2      .0          ST  96  SH  ti
 
 TA  20      7-  7-      00          SI  79  BU  ve
 
 DI  65      20  20                  DE      RN    
 
 NE  08      10  10                              
 
    7                               PH      AM    
 
 10                                  AR      Y     
 
                                    MA      B     
 
 MG                                  CY            
 
                                               
 
 TA                         OF            
 
 BL                                   
 
 ET                      CY      
 
                         NT      
 
                  HI      
 
                AN      
 
                A      
 
                     
 
                  
 
                  
 
                  
 
                  
 
               
 
               
 
               
 
              
 
     00      03  03  0   30  30      EA  16  CO  Ac
 
     00      -2  -2      .0          ST  96  MM  ti
 
     60      7-  7      00          SI  80  UN  ve
 
     11      20  20                  DE      IT    
 
     73      10  10                         Y     
 
     1                               PH      AL    
 
                                     AR      LE    
 
                                     MA      RG    
 
                                     CY      Y     
 
                                          &     
 
                            OF      AS    
 
                                   TH 
 
                         CY   MA 
 
                         NT     
 
                  HI   PS 
 
                AN   C  
 
                A       
 
                        
 
                  
 
                  
 
                  
 
                  
 
                  
 
                  
 
                 
 
              
 
     00      03  03  0   30  30      EA  16  MA  Ac
 
     00      -2  -2      .0          ST  96  SH  ti
 
     60      7-  7-      00          SI  80  BU  ve
 
     11      20  20                  DE      RN    
 
     73      10  10                              
 
     1                               PH      AM    
 
                                     AR      Y     
 
                                     MA      B     
 
                                     CY            
 
                                                
 
                            OF            
 
                                      
 
                         CY      
 
                         NT      
 
                  HI      
 
                AN      
 
                A      
 
                     
 
               
 
               
 
               
 
               
 
               
 
               
 
               
 
              
 
 ME  00      03  03  0   21  6       EA  16  ST  Ac
 
 TH  78      -2  -2      .0          ST  91  EP  ti
 
 YL  15      4-  4-      00          SI  92  HE  ve
 
 CO  02      20  20                  DE      NS    
 
 ED  20      10  10                              
 
 NI  7                               PH      DO    
 
 SO                                  AR      N     
 
 LO                                  MA      R     
 
 NE                                  CY            
 
  4                                            
 
                           OF            
 
 MG                                   
 
                        CY      
 
 DO                      NT      
 
 SE               HI      
 
 PK             AN      
 
                A      
 
                     
 
                  
 
                  
 
                  
 
                  
 
                  
 
                  
 
                  
 
              
 
 AM  00      03  03  0   30  10      EA  16  ST  Ac
 
 OX  78      -2  -2      .0          ST  91  EP  ti
 
 IC  12      4-  4-      00          SI  93  HE  ve
 
 IL  02      20  20                  DE      NS    
 
 LI  00      10  10                              
 
 N   5                               PH      DO    
 
 25                                  AR      N     
 
 0                                   MA      R     
 
 MG                                  CY            
 
                                               
 
 CA                         OF            
 
 PS                                   
 
 UL                      CY      
 
 E                       NT      
 
                  HI      
 
                AN      
 
                A      
 
                     
 
                  
 
                  
 
                  
 
                  
 
                 
 
               
 
               
 
              
 
 SE  45      03  03  3   12  15      EA  16  ST  Ac
 
 LE  80      -2  -2      0.          ST  91  EP  ti
 
 NI  20      4-  4-      00          SI  94  HE  ve
 
 UM  04      20  20      0           DE      NS    
 
    06      10  10                              
 
 [place]  4                               PH      DO    
 
 LF                                  AR      N     
 
 ID                                  MA      R     
 
 E                                   CY            
 
 2.                                             
 
 5%                         OF            
 
                                     
 
 LO                        CY      
 
 TI                      NT      
 
 ON               HI      
 
                AN      
 
                A      
 
                     
 
                  
 
                  
 
                  
 
                  
 
                  
 
                  
 
               
 
              
 
 CO  68      02  02  00  12  2       WA  70  ST  Ac
 
 OM  38      -1  -2      .0          L-  58  EP  ti
 
 ET  20      4-  6-      00          MA  50  HE  ve
 
 HA  04      20  20                  RT  9   NS    
 
 ZI  10      10  10                              
 
 NE  1                               PH      DO    
 
                                    AR      N     
 
 25                                  MA      R     
 
                                    CY            
 
 MG                                             
 
                           #5            
 
 TA                         91         
 
 BL                                
 
 ET                              
 
                          
 
                        
 
                       
 
                     
 
                  
 
                  
 
                  
 
               
 
               
 
 AM  00      02  02  00  21  7       EA  16  ST  Ac
 
 OX  78      -1  -2      .0          ST  38  EP  ti
 
 IC  12      6-  6-      00          SI  63  HE  ve
 
 IL  61      20  20                  DE      NS    
 
 LI  30      10  10                              
 
 N   5                               PH      DO    
 
 50                                  AR      N     
 
 0                                   MA      R     
 
 MG                                  CY            
 
                                               
 
 CA                         OF            
 
 PS                         CY         
 
 UL                      NT      
 
 E                       HI      
 
                  AN      
 
                A       
 
                       
 
                     
 
                  
 
                  
 
                  
 
                  
 
                 
 
               
 
              
 
 LO  45      02  02  00  14  14      EA  16  ST  Ac
 
 RA  80      -1  -2      .0          ST  38  EP  ti
 
 TA  20      6-  6-      00          SI  64  HE  ve
 
 DI  65      20  20                  DE      NS    
 
 NE  08      10  10                              
 
    7                               PH      DO    
 
 10                                  AR      N     
 
                                    MA      R     
 
 MG                                  CY            
 
                                                
 
 TA                         OF            
 
 BL                         CY         
 
 ET                      NT      
 
                         HI      
 
                  AN      
 
                A       
 
                       
 
                     
 
                  
 
                  
 
                  
 
                  
 
               
 
               
 
              
 
     00      01  12  04  30  30      EA  11  CO  Ac
 
     00      -1  -0      .0          ST  08  MM  ti
 
     60      3-  3-      00          SI  14  UN  ve
 
     11      20  20                  DE      IT    
 
     73      09  09                         Y     
 
     1                               PH      AL    
 
                                     AR      LE    
 
                                     MA      RG    
 
                                     CY      Y     
 
                                           &     
 
                            OF      AS    
 
                            CY      TH 
 
                         NT   MA 
 
                         HI     
 
                  AN   PS 
 
                A    C  
 
                        
 
                        
 
                  
 
                  
 
                  
 
                  
 
                  
 
                  
 
                
 
 LO  45      01  12  03  30  30      EA  11  CO  Ac
 
 RA  80      -1  -0      .0          ST  08  MM  ti
 
 TA  20      3-  3-      00          SI  13  UN  ve
 
 DI  65      20  20                  DE      IT    
 
 NE  08      09  09                         Y     
 
    7                               PH      AL    
 
 10                                  AR      LE    
 
                                    MA      RG    
 
 MG                                  CY      Y     
 
                                          &     
 
 TA                         OF      AS    
 
 BL                         CY      TH 
 
 ET                      NT   MA 
 
                         HI     
 
                  AN   PS 
 
                A    C  
 
                        
 
                        
 
                     
 
                     
 
                     
 
                     
 
                  
 
                  
 
                
 
 NA  00      01  12  01  17  30      EA  11  CO  Ac
 
 SO  08      -1  -0      .0          ST  08  MM  ti
 
 NE  51      3-  3-      00          SI  11  UN  ve
 
 X   28      20  20                  DE      IT    
 
 50  80      09  09                         Y     
 
    1                               PH      AL    
 
 MC                                  AR      LE    
 
 G                                   MA      RG    
 
 NA                                  CY      Y     
 
 SA                                        &     
 
 L                          OF      AS    
 
 SP                         CY      TH 
 
 RA                      NT   MA 
 
 Y                       HI     
 
                  AN   PS 
 
                A    C  
 
                        
 
                        
 
                     
 
                     
 
                     
 
                     
 
                    
 
                  
 
                
 
 LO  60      01  11  02  30  30      EA  11  CO  Ac
 
 RA  50      -1  -1      .0          ST  08  MM  ti
 
 TA  50      3-  9-      00          SI  13  UN  ve
 
 DI  14      20  20                  DE      IT    
 
 NE  70      09  09                         Y     
 
    8                               PH      AL    
 
 10                                  AR      LE    
 
                                    MA      RG    
 
 MG                                  CY      Y     
 
                                          &     
 
 TA                         OF      AS    
 
 BL                         CY      TH 
 
 ET                      NT   MA 
 
                         HI     
 
                  AN   PS 
 
                A    C  
 
                        
 
                        
 
                     
 
                     
 
                     
 
                     
 
                  
 
                  
 
                
 
 AZ  00      09  09  00  6.  5       EA  14  ST  Ac
 
 IT  09      -1  -2      00          ST  28  EP  ti
 
 HR  37      6-  4-      0           SI  03  HE  ve
 
 OM  14      20  20                  DE      NS    
 
 YC  61      09  09                              
 
 IN  8                               PH      DO    
 
                                    AR      N     
 
 25                                  MA      R     
 
 0                                   CY            
 
 MG                                             
 
                           OF            
 
 TA                        CY         
 
 BL                      NT      
 
 ET                      HI      
 
                  AN      
 
                A       
 
                       
 
                     
 
                  
 
                  
 
                  
 
                  
 
                  
 
               
 
              
 
 VE  00      07  08  00  18  18      EA  13  CO  Ac
 
 NT  17      -3  -1      .0          ST  68  MM  ti
 
 OL  30      1-  3-      00          SI  23  UN  ve
 
 IN  68      20  20                  DE      IT    
 
    22      09  09                         Y     
 
 HF  0                               PH      AL    
 
 A                                   AR      LE    
 
 90                                  MA      RG    
 
                                    CY      Y     
 
 MC                                        &     
 
 G                          OF      AS    
 
 IN                         CY      TH 
 
 FRANCIS                      NT   MA 
 
 LE                      HI     
 
 R                AN   PS 
 
                A    C  
 
                        
 
                        
 
                     
 
                     
 
                     
 
                     
 
                     
 
                    
 
                
 
 CO  37      01  06  04  30  30      EA  11  CO  Ac
 
 IL  00      -1  -1      .0          ST  08  MM  ti
 
 OS  00      3  8-      00          SI  12  UN  ve
 
 EC  45      20  20                  DE      IT    
 
    50      09  09                         Y     
 
 OT  2                               PH      AL    
 
 C                                   AR      LE    
 
 20                                  MA      RG    
 
 .6                                  CY      Y     
 
                                          &     
 
 MG                         OF      AS    
 
                           CY      TH 
 
 TA                      NT   MA 
 
 BL                      HI     
 
 ET               AN   PS 
 
                A    C  
 
                        
 
                        
 
                     
 
                     
 
                     
 
                     
 
                     
 
                     
 
                
 
 LO  60      01  06  01  30  30      EA  11  CO  Ac
 
 RA  50      -1  -1      .0          ST  08  MM  ti
 
 TA  50      3-  8-      00          SI  13  UN  ve
 
 DI  14      20  20                  DE      IT    
 
 NE  70      09  09                         Y     
 
    8                               PH      AL    
 
 10                                  AR      LE    
 
                                    MA      RG    
 
 MG                                  CY      Y     
 
                                          &     
 
 TA                         OF      AS    
 
 BL                         CY      TH 
 
 ET                      NT   MA 
 
                         HI     
 
                  AN   PS 
 
                A    C  
 
                        
 
                        
 
                     
 
                     
 
                     
 
                     
 
                  
 
                  
 
                
 
     00      01  06  03  30  30      EA  11  CO  Ac
 
     00      -1  -1      .0          ST  08  MM  ti
 
     60      3-  8-      00          SI  14  UN  ve
 
     11      20  20                  DE      IT    
 
     73      09  09                         Y     
 
     1                               PH      AL    
 
                                     AR      LE    
 
                                     MA      RG    
 
                                     CY      Y     
 
                                           &     
 
                            OF      AS    
 
                            CY      TH 
 
                         NT   MA 
 
                         HI     
 
                  AN   PS 
 
                A    C  
 
                        
 
                        
 
                  
 
                  
 
                  
 
                  
 
                  
 
                  
 
                
 
     00      01  05  02  30  30      EA  11  CO  Ac
 
     00      -1  -0      .0          ST  08  MM  ti
 
     60      3-  7-      00          SI  14  UN  ve
 
     11      20  20                  DE      IT    
 
     73      09  09                         Y     
 
     1                               PH      AL    
 
                                     AR      LE    
 
                                     MA      RG    
 
                                     CY      Y     
 
                                           &     
 
                            OF      AS    
 
                            CY      TH 
 
                         NT   MA 
 
                         HI     
 
                  AN   PS 
 
                A    C  
 
                        
 
                        
 
                  
 
                  
 
                  
 
                  
 
                  
 
                  
 
                
 
 LO  45      01  05  00  30  30      EA  11  CO  Ac
 
 RA  80      -1  -0      .0          ST  08  MM  ti
 
 TA  20      3-  7-      00          SI  13  UN  ve
 
 DI  65      20  20                  DE      IT    
 
 NE  08      09  09                         Y     
 
    7                               PH      AL    
 
 10                                  AR      LE    
 
                                    MA      RG    
 
 MG                                  CY      Y     
 
                                          &     
 
 TA                         OF      AS    
 
 BL                         CY      TH 
 
 ET                      NT   MA 
 
                         HI     
 
                  AN   PS 
 
                A    C  
 
                        
 
                        
 
                     
 
                     
 
                     
 
                     
 
                  
 
                  
 
                
 
 CO  37      01  05  03  30  30      EA  11  CO  Ac
 
 IL  00      -1  -0      .0          ST  08  MM  ti
 
 OS  00      3-  7-      00          SI  12  UN  ve
 
 EC  45      20  20                  DE      IT    
 
    50      09  09                         Y     
 
 OT  2                               PH      AL    
 
 C                                   AR      LE    
 
 20                                  MA      RG    
 
 .6                                  CY      Y     
 
                                          &     
 
 MG                         OF      AS    
 
                           CY      TH 
 
 TA                      NT   MA 
 
 BL                      HI     
 
 ET               AN   PS 
 
                A    C  
 
                        
 
                        
 
                     
 
                     
 
                     
 
                     
 
                     
 
                     
 
                
 
 AZ  00      03  04  00  6.  5       EA  12  ST  Ac
 
 IT  09      -2  -0      00          ST  01  EP  ti
 
 HR  37      3-  9-      0           SI  45  HE  ve
 
 OM  14      20  20                  DE      NS    
 
 YC  61      09  09                              
 
 IN  8                               PH      DO    
 
                                    AR      N     
 
 25                                  MA      R     
 
 0                                   CY            
 
 MG                                             
 
                           OF            
 
 TA                        CY         
 
 BL                      NT      
 
 ET                      HI      
 
                  AN      
 
                A       
 
                       
 
                     
 
                  
 
                  
 
                  
 
                  
 
                  
 
               
 
              
 
     60      03  04  00  12  6       EA  12  ST  Ac
 
     25      -2  -0      0.          ST  01  EP  ti
 
     80      3-  9-      00          SI  46  HE  ve
 
     23      20  20      0           DE      NS    
 
     91      09  09                              
 
     6                               PH      DO    
 
                                     AR      N     
 
                                     MA      R     
 
                                     CY            
 
                                                
 
                            OF            
 
                            CY         
 
                           NT      
 
                         HI      
 
                  AN      
 
                A       
 
                       
 
                     
 
               
 
               
 
               
 
               
 
               
 
               
 
              
 
     00      01  03  01  30  30      EA  11  CO  Ac
 
     00      -1  -2      .0          ST  08  MM  ti
 
     60      3-  6-      00          SI  14  UN  ve
 
     11      20  20                  DE      IT    
 
     73      09  09                         Y     
 
     1                               PH      AL    
 
                                     AR      LE    
 
                                     MA      RG    
 
                                     CY      Y     
 
                                           &     
 
                            OF      AS    
 
                            CY      TH 
 
                         NT   MA 
 
                         HI     
 
                  AN   PS 
 
                A    C  
 
                        
 
                        
 
                  
 
                  
 
                  
 
                  
 
                  
 
                  
 
                
 
 CO  37      01  03  02  30  30      EA  11  CO  Ac
 
 IL  00      -1  -2      .0          ST  08  MM  ti
 
 OS  00      3-  6-      00          SI  12  UN  ve
 
 EC  45      20  20                  DE      IT    
 
    50      09  09                         Y     
 
 OT  2                               PH      AL    
 
 C                                   AR      LE    
 
 20                                  MA      RG    
 
 .6                                  CY      Y     
 
                                          &     
 
 MG                         OF      AS    
 
                           CY      TH 
 
 TA                      NT   MA 
 
 BL                      HI     
 
 ET               AN   PS 
 
                A    C  
 
                        
 
                        
 
                     
 
                     
 
                     
 
                     
 
                     
 
                     
 
                
 
     00      01  02  00  30  30      EA  11  CO  Ac
 
     00      -1  -2      .0          ST  08  MM  ti
 
     60      3-  6-      00          SI  14  UN  ve
 
     11      20  20                  DE      IT    
 
     73      09  09                         Y     
 
     1                               PH      AL    
 
                                     AR      LE    
 
                                     MA      RG    
 
                                     CY      Y     
 
                                           &     
 
                            OF      AS    
 
                            CY      TH 
 
                         NT   MA 
 
                         HI     
 
                  AN   PS 
 
                A    C  
 
                        
 
                        
 
                  
 
                  
 
                  
 
                  
 
                  
 
                  
 
                
 
 CO  37      01  02  01  30  30      EA  11  CO  Ac
 
 IL  00      -1  -2      .0          ST  08  MM  ti
 
 OS  00      3-  6-      00          SI  12  UN  ve
 
 EC  45      20  20                  DE      IT    
 
    50      09  09                         Y     
 
 OT  2                               PH      AL    
 
 C                                   AR      LE    
 
 20                                  MA      RG    
 
 .6                                  CY      Y     
 
                                          &     
 
 MG                         OF      AS    
 
                           CY      TH 
 
 TA                      NT   MA 
 
 BL                      HI     
 
 ET               AN   PS 
 
                A    C  
 
                        
 
                        
 
                     
 
                     
 
                     
 
                     
 
                     
 
                     
 
                
 
 AZ  00      01  01  00  6.  5       EA  11  ST  Ac
 
 IT  09      -2  -3      00          ST  18  EP  ti
 
 HR  37      1-  0-      0           SI  16  HE  ve
 
 OM  14      20  20                  DE      NS    
 
 YC  61      09  09                              
 
 IN  8                               PH      DO    
 
                                    AR      N     
 
 25                                  MA      R     
 
 0                                   CY            
 
 MG                                             
 
                           OF            
 
 TA                        CY         
 
 BL                      NT      
 
 ET                      HI      
 
                  AN      
 
                A       
 
                       
 
                     
 
                  
 
                  
 
                  
 
                  
 
                  
 
               
 
              
 
 NA  00      01  01  00  17  30      EA  11  CO  Ac
 
 SO  08      -1  -3      .0          ST  08  MM  ti
 
 NE  51      3-  0-      00          SI  11  UN  ve
 
 X   28      20  20                  DE      IT    
 
 50  80      09  09                         Y     
 
    1                               PH      AL    
 
 MC                                  AR      LE    
 
 G                                   MA      RG    
 
 NA                                  CY      Y     
 
 SA                                        &     
 
 L                          OF      AS    
 
 SP                         CY      TH 
 
 RA                      NT   MA 
 
 Y                       HI     
 
                  AN   PS 
 
                A    C  
 
                        
 
                        
 
                     
 
                     
 
                     
 
                     
 
                    
 
                  
 
                
 
 CO  37      01  01  00  30  30      EA  11  CO  Ac
 
 IL  00      -1  -3      .0          ST  08  MM  ti
 
 OS  00      3-  0-      00          SI  12  UN  ve
 
 EC  45      20  20                  DE      IT    
 
    50      09  09                         Y     
 
 OT  2                               PH      AL    
 
 C                                   AR      LE    
 
 20                                  MA      RG    
 
 .6                                  CY      Y     
 
                                          &     
 
 MG                         OF      AS    
 
                           CY      TH 
 
 TA                      NT   MA 
 
 BL                      HI     
 
 ET               AN   PS 
 
                A    C  
 
                        
 
                        
 
                     
 
                     
 
                     
 
                     
 
                     
 
                     
 
                
 
     49      07  01  05  60  30      EA  98  CO  Ac
 
     88      -0  -0      .0          ST  63  MM  ti
 
     40      7-  1-      00          SI  67  UN  ve
 
     54      20  20                  DE      IT    
 
     41      08  09                         Y     
 
     0                               PH      AL    
 
                                     AR      LE    
 
                                     MA      RG    
 
                                     CY      Y     
 
                                           &     
 
                            OF      AS    
 
                            CY      TH 
 
                         NT   MA 
 
                         HI     
 
                  AN   PS 
 
                A    C  
 
                        
 
                        
 
                  
 
                  
 
                  
 
                  
 
                  
 
                  
 
                
 
 LO  60      10  01  02  30  30      EA  99  CO  Ac
 
 RA  50      -2  -0      .0          ST  94  MM  ti
 
 TA  50      1-  1-      00          SI  12  UN  ve
 
 DI  14      20  20                  DE      IT    
 
 NE  70      08  09                         Y     
 
    1                               PH      AL    
 
 10                                  AR      LE    
 
                                    MA      RG    
 
 MG                                  CY      Y     
 
                                          &     
 
 TA                         OF      AS    
 
 BL                         CY      TH 
 
 ET                      NT   MA 
 
                         HI     
 
                  AN   PS 
 
                A    C  
 
                        
 
                        
 
                     
 
                     
 
                     
 
                     
 
                  
 
                  
 
                
 
     00      07  01  05  30  30      EA  98  CO  Ac
 
     00      -0  -0      .0          ST  63  MM  ti
 
     60      7-  1-      00          SI  68  UN  ve
 
     11      20  20                  DE      IT    
 
     73      08  09                         Y     
 
     1                               PH      AL    
 
                                     AR      LE    
 
                                     MA      RG    
 
                                     CY      Y     
 
                                           &     
 
                            OF      AS    
 
                            CY      TH 
 
                         NT   MA 
 
                         HI     
 
                  AN   PS 
 
                A    C  
 
                        
 
                        
 
                  
 
                  
 
                  
 
                  
 
                  
 
                  
 
                
 
 CO  60      12  12  00  12  3       EA  10  ST  Ac
 
 OM  43      -0  -1      0.          ST  57  EP  ti
 
 ET  20      8-  8-      00          SI  97  HE  ve
 
 HA  60      20  20      0           DE      NS    
 
 ZI  80      08  08                              
 
 NE  4                               PH      DO    
 
                                    AR      N     
 
 6.                                  MA      R     
 
 25                                  CY            
 
                                               
 
 MG                         OF            
 
 /5                         CY         
 
                          NT      
 
 ML                      HI      
 
                 AN      
 
 SY             A       
 
 RP                    
 
                     
 
                  
 
                  
 
                  
 
                  
 
                  
 
                  
 
                 
 
               
 
     49      07  12  04  60  30      EA  98  CO  Ac
 
     88      -0  -0      .0          ST  63  MM  ti
 
     40      7-  4-      00          SI  67  UN  ve
 
     54      20  20                  DE      IT    
 
     40      08  08                         Y     
 
     2                               PH      AL    
 
                                     AR      LE    
 
                                     MA      RG    
 
                                     CY      Y     
 
                                           &     
 
                            OF      AS    
 
                            CY      TH 
 
                         NT   MA 
 
                         HI     
 
                  AN   PS 
 
                A    C  
 
                        
 
                        
 
                  
 
                  
 
                  
 
                  
 
                  
 
                  
 
                
 
     00      07  12  04  30  30      EA  98  CO  Ac
 
     00      -0  -0      .0          ST  63  MM  ti
 
     60      7-  4-      00          SI  68  UN  ve
 
     11      20  20                  DE      IT    
 
     73      08  08                         Y     
 
     1                               PH      AL    
 
                                     AR      LE    
 
                                     MA      RG    
 
                                     CY      Y     
 
                                           &     
 
                            OF      AS    
 
                            CY      TH 
 
                         NT   MA 
 
                         HI     
 
                  AN   PS 
 
                A    C  
 
                        
 
                        
 
                  
 
                  
 
                  
 
                  
 
                  
 
                  
 
                
 
 LO  60      10  12  01  30  30      EA  99  CO  Ac
 
 RA  50      -2  -0      .0          ST  94  MM  ti
 
 TA  50      1-  4-      00          SI  12  UN  ve
 
 DI  14      20  20                  DE      IT    
 
 NE  70      08  08                         Y     
 
    1                               PH      AL    
 
 10                                  AR      LE    
 
                                    MA      RG    
 
 MG                                  CY      Y     
 
                                          &     
 
 TA                         OF      AS    
 
 BL                         CY      TH 
 
 ET                      NT   MA 
 
                         HI     
 
                  AN   PS 
 
                A    C  
 
                        
 
                        
 
                     
 
                     
 
                     
 
                     
 
                  
 
                  
 
                
 
 LO  60      10  11  00  30  30      EA  99  CO  Ac
 
 RA  50      -2  -0      .0          ST  94  MM  ti
 
 TA  50      1-  7-      00          SI  12  UN  ve
 
 DI  14      20  20                  DE      IT    
 
 NE  70      08  08                         Y     
 
    1                               PH      AL    
 
 10                                  AR      LE    
 
                                    MA      RG    
 
 MG                                  CY      Y     
 
                                          &     
 
 TA                         OF      AS    
 
 BL                         CY      TH 
 
 ET                      NT   MA 
 
                         HI     
 
                  AN   PS 
 
                A    C  
 
                        
 
                        
 
                     
 
                     
 
                     
 
                     
 
                  
 
                  
 
                
 
 RA  00      07  11  03  60  30      EA  98  CO  Ac
 
 NI  17      -0  -0      .0          ST  63  MM  ti
 
 TI  24      7-  7      00          SI  67  UN  ve
 
 DI  35      20  20                  DE      IT    
 
 NE  77      08  08                         Y     
 
    0                               PH      AL    
 
 15                                  AR      LE    
 
 0                                   MA      RG    
 
 MG                                  CY      Y     
 
                                          &     
 
 TA                         OF      AS    
 
 BL                         CY      TH 
 
 ET                      NT   MA 
 
                         HI     
 
                  AN   PS 
 
                A    C  
 
                        
 
                        
 
                     
 
                     
 
                     
 
                     
 
                  
 
                  
 
                
 
     00      07  11  03  30  30      EA  98  CO  Ac
 
     00      -0  -0      .0          ST  63  MM  ti
 
     60      7-  7-      00          SI  68  UN  ve
 
     11      20  20                  DE      IT    
 
     73      08  08                         Y     
 
     1                               PH      AL    
 
                                     AR      LE    
 
                                     MA      RG    
 
                                     CY      Y     
 
                                           &     
 
                            OF      AS    
 
                            CY      TH 
 
                         NT   MA 
 
                         HI     
 
                  AN   PS 
 
                A    C  
 
                        
 
                        
 
                  
 
                  
 
                  
 
                  
 
                  
 
                  
 
                
 
 AM  00      10  10  00  21  7       EA  99  No  Ac
 
 OX  78      -0  -2      .0          ST  73  t   ti
 
 IC  12      4-  3-      00          SI  66  Av  ve
 
 IL  02      20  20                  DE      ai    
 
 LI  00      08  08                         la    
 
 N   5                               PH      bl    
 
 25                                  AR      e     
 
 0                                   MA            
 
 MG                                  CY            
 
                                               
 
 CA                         OF            
 
 PS                         CY         
 
 UL                      NT      
 
 E                       HI      
 
                  AN      
 
                A      
 
                     
 
                     
 
                  
 
                  
 
                  
 
                  
 
                 
 
               
 
              
 
     00      07  10  02  30  30      EA  98  No  Ac
 
     00      -0  -0      .0          ST  63  t   ti
 
     60      7-  9-      00          SI  68  Av  ve
 
     11      20  20                  DE      ai    
 
     73      08  08                         la    
 
     1                               PH      bl    
 
                                     AR      e     
 
                                     MA            
 
                                     CY            
 
                                                 
 
                            OF            
 
                            CY         
 
                         NT      
 
                         HI      
 
                  AN      
 
                A      
 
                     
 
                     
 
               
 
               
 
               
 
               
 
               
 
               
 
              
 
 LO  60      09  10  00  30  30      EA  99  No  Ac
 
 RA  50      -2  -0      .0          ST  55  t   ti
 
 TA  50      2-  9-      00          SI  80  Av  ve
 
 DI  14      20  20                  DE      ai    
 
 NE  70      08  08                         la    
 
    1                               PH      bl    
 
 10                                  AR      e     
 
                                    MA            
 
 MG                                  CY            
 
                                                
 
 TA                         OF            
 
 BL                         CY         
 
 ET                      NT      
 
                         HI      
 
                  AN      
 
                A      
 
                     
 
                     
 
                  
 
                  
 
                  
 
                  
 
               
 
               
 
              
 
     49      07  10  02  60  30      EA  98  No  Ac
 
     88      -0  -0      .0          ST  63  t   ti
 
     40      7-  9-      00          SI  67  Av  ve
 
     54      20  20                  DE      ai    
 
     40      08  08                         la    
 
     2                               PH      bl    
 
                                     AR      e     
 
                                     MA            
 
                                     CY            
 
                                                 
 
                            OF            
 
                            CY         
 
                         NT      
 
                         HI      
 
                  AN      
 
                A      
 
                     
 
                     
 
               
 
               
 
               
 
               
 
               
 
               
 
              
 
     49      07  08  01  60  30      EA  98  No  Ac
 
     88      -0  -2      .0          ST  63  t   ti
 
     40      7-  8-      00          SI  67  Av  ve
 
     54      20  20                  DE      ai    
 
     40      08  08                         la    
 
     2                               PH      bl    
 
                                     AR      e     
 
                                     MA            
 
                                     CY            
 
                                                 
 
                            OF            
 
                            CY         
 
                         NT      
 
                         HI      
 
                  AN      
 
                A      
 
                     
 
                     
 
               
 
               
 
               
 
               
 
               
 
               
 
              
 
 LO  60      01  08  06  30  30      EA  96  No  Ac
 
 RA  50      -2  -2      .0          ST  44  t   ti
 
 TA  50      1-  8-      00          SI  65  Av  ve
 
 DI  14      20  20                  DE      ai    
 
 NE  70      08  08                         la    
 
    1                               PH      bl    
 
 10                                  AR      e     
 
                                    MA            
 
 MG                                  CY            
 
                                                
 
 TA                         OF            
 
 BL                         CY         
 
 ET                      NT      
 
                         HI      
 
                  AN      
 
                A      
 
                     
 
                     
 
                  
 
                  
 
                  
 
                  
 
               
 
               
 
              
 
     00      07  08  01  30  30      EA  98  No  Ac
 
     00      -0  -2      .0          ST  63  t   ti
 
     60      7-  8-      00          SI  68  Av  ve
 
     11      20  20                  DE      ai    
 
     73      08  08                         la    
 
     1                               PH      bl    
 
                                     AR      e     
 
                                     MA            
 
                                     CY            
 
                                                 
 
                            OF            
 
                            CY         
 
                         NT      
 
                         HI      
 
                  AN      
 
                A      
 
                     
 
                     
 
               
 
               
 
               
 
               
 
               
 
               
 
              
 
 LO  60      01  08  05  30  30      EA  96  No  Ac
 
 RA  50      -2  -0      .0          ST  44  t   ti
 
 TA  50      1-  1-      00          SI  65  Av  ve
 
 DI  14      20  20                  DE      ai    
 
 NE  70      08  08                         la    
 
    1                               PH      bl    
 
 10                                  AR      e     
 
                                    MA            
 
 MG                                  CY            
 
                                                
 
 TA                         OF            
 
 BL                         CY         
 
 ET                      NT      
 
                         HI      
 
                  AN      
 
                A      
 
                     
 
                     
 
                  
 
                  
 
                  
 
                  
 
               
 
               
 
              
 
 SE  45      01  08  01  12  4       EA  96  No  Ac
 
 LE  80      -0  -0      0.          ST  28  t   ti
 
 NI  20      9-  1-      00          SI  45  Av  ve
 
 UM  04      20  20      0           DE      ai    
 
    06      08  08                         la    
 
 [place]  4                               PH      bl    
 
 LF                                  AR      e     
 
 ID                                  MA            
 
 E                                   CY            
 
 2.                                             
 
 5%                         OF            
 
                           CY         
 
 LO                        NT      
 
 TI                      HI      
 
 ON               AN      
 
                A      
 
                     
 
                     
 
                  
 
                  
 
                  
 
                  
 
                  
 
                  
 
              
 
 VE  00      07  07  00  18  18      EA  98  No  Ac
 
 NT  17      -0  -1      .0          ST  63  t   ti
 
 OL  30      7-  7-      00          SI  69  Av  ve
 
 IN  68      20  20                  DE      ai    
 
    22      08  08                         la    
 
 HF  0                               PH      bl    
 
 A                                   AR      e     
 
 90                                  MA            
 
                                    CY            
 
 MC                                              
 
 G                          OF            
 
 IN                         CY         
 
 [place]                      NT      
 
 LE                      HI      
 
 R                AN      
 
                A      
 
                     
 
                     
 
                  
 
                  
 
                  
 
                  
 
                  
 
                 
 
              
 
 NA  00      07  07  00  17  17      EA  98  No  Ac
 
 SO  08      -0  -1      .0          ST  63  t   ti
 
 NE  51      7-  7-      00          SI  66  Av  ve
 
 X   28      20  20                  DE      ai    
 
 50  80      08  08                         la    
 
    1                               PH      bl    
 
 MC                                  AR      e     
 
 G                                   MA            
 
 NA                                  CY            
 
 SA                                              
 
 L                          OF            
 
 SP                         CY         
 
 RA                      NT      
 
 Y                       HI      
 
                  AN      
 
                A      
 
                     
 
                     
 
                  
 
                  
 
                  
 
                  
 
                 
 
               
 
              
 
     00      07  07  00  30  30      EA  98  No  Ac
 
     00      -0  -1      .0          ST  63  t   ti
 
     60      7-  7      00          SI  68  Av  ve
 
     11      20  20                  DE      ai    
 
     73      08  08                         la    
 
     1                               PH      bl    
 
                                     AR      e     
 
                                     MA            
 
                                     CY            
 
                                                 
 
                            OF            
 
                            CY         
 
                         NT      
 
                         HI      
 
                  AN      
 
                A      
 
                     
 
                     
 
               
 
               
 
               
 
               
 
               
 
               
 
              
 
     49      07  07  00  60  30      EA  98  No  Ac
 
     88      -0  -1      .0          ST  63  t   ti
 
     40      7-  7-      00          SI  67  Av  ve
 
     54      20  20                  DE      ai    
 
     40      08  08                         la    
 
     2                               PH      bl    
 
                                     AR      e     
 
                                     MA            
 
                                     CY            
 
                                                 
 
                            OF            
 
                            CY         
 
                         NT      
 
                         HI      
 
                  AN      
 
                A      
 
                     
 
                     
 
               
 
               
 
               
 
               
 
               
 
               
 
              
 
 NA  00      01  07  02  17  17      EA  96  No  Ac
 
 SO  08      -2  -0      .0          ST  44  t   ti
 
 NE  51      1-  3-      00          SI  61  Av  ve
 
 X   28      20  20                  DE      ai    
 
 50  80      08  08                         la    
 
    1                               PH      bl    
 
 MC                                  AR      e     
 
 G                                   MA            
 
 NA                                  CY            
 
 SA                                              
 
 L                          OF            
 
 SP                         CY         
 
 RA                      NT      
 
 Y                       HI      
 
                  AN      
 
                A      
 
                     
 
                     
 
                  
 
                  
 
                  
 
                  
 
                 
 
               
 
              
 
 LO  60      01  06  04  30  30      EA  96  No  Ac
 
 RA  50      -2  -1      .0          ST  44  t   ti
 
 TA  50      1-  2-      00          SI  65  Av  ve
 
 DI  14      20  20                  DE      ai    
 
 NE  70      08  08                         la    
 
    1                               PH      bl    
 
 10                                  AR      e     
 
                                    MA            
 
 MG                                  CY            
 
                                                
 
 TA                         OF            
 
 BL                         CY         
 
 ET                      NT      
 
                         HI      
 
                  AN      
 
                A      
 
                     
 
                     
 
                  
 
                  
 
                  
 
                  
 
               
 
               
 
              
 
     00      01  06  04  30  30      EA  96  No  Ac
 
     00      -2  -1      .0          ST  44  t   ti
 
     60      1-  2-      00          SI  63  Av  ve
 
     11      20  20                  DE      ai    
 
     73      08  08                         la    
 
     1                               PH      bl    
 
                                     AR      e     
 
                                     MA            
 
                                     CY            
 
                                                 
 
                            OF            
 
                            CY         
 
                         NT      
 
                         HI      
 
                  AN      
 
                A      
 
                     
 
                     
 
               
 
               
 
               
 
               
 
               
 
               
 
              
 
     49      01  06  04  60  30      EA  96  No  Ac
 
     88      -2  -1      .0          ST  44  t   ti
 
     40      1-  2-      00          SI  64  Av  ve
 
     54      20  20                  DE      ai    
 
     40      08  08                         la    
 
     2                               PH      bl    
 
                                     AR      e     
 
                                     MA            
 
                                     CY            
 
                                                 
 
                            OF            
 
                            CY         
 
                         NT      
 
                         HI      
 
                  AN      
 
                A      
 
                     
 
                     
 
               
 
               
 
               
 
               
 
               
 
               
 
              
 
     49      01  05  03  60  30      EA  96  No  Ac
 
     88      -2  -2      .0          ST  44  t   ti
 
     40      1-  2-      00          SI  64  Av  ve
 
     54      20  20                  DE      ai    
 
     40      08  08                         la    
 
     2                               PH      bl    
 
                                     AR      e     
 
                                     MA            
 
                                     CY            
 
                                                 
 
                            OF            
 
                            CY         
 
                         NT      
 
                         HI      
 
                  AN      
 
                A      
 
                     
 
                     
 
               
 
               
 
               
 
               
 
               
 
               
 
              
 
     00      01  05  03  30  30      EA  96  No  Ac
 
     00      -2  -2      .0          ST  44  t   ti
 
     60      1-  2-      00          SI  63  Av  ve
 
     11      20  20                  DE      ai    
 
     73      08  08                         la    
 
     1                               PH      bl    
 
                                     AR      e     
 
                                     MA            
 
                                     CY            
 
                                                 
 
                            OF            
 
                            CY         
 
                         NT      
 
                         HI      
 
                  AN      
 
                A      
 
                     
 
                     
 
               
 
               
 
               
 
               
 
               
 
               
 
              
 
 LO  60      01  05  03  30  30      EA  96  No  Ac
 
 RA  50      -2  -2      .0          ST  44  t   ti
 
 TA  50      1-  2-      00          SI  65  Av  ve
 
 DI  14      20  20                  DE      ai    
 
 NE  70      08  08                         la    
 
    1                               PH      bl    
 
 10                                  AR      e     
 
                                    MA            
 
 MG                                  CY            
 
                                                
 
 TA                         OF            
 
 BL                         CY         
 
 ET                      NT      
 
                         HI      
 
                  AN      
 
                A      
 
                     
 
                     
 
                  
 
                  
 
                  
 
                  
 
               
 
               
 
              
 
 LO  60      01  04  02  30  30      EA  96  No  Ac
 
 RA  50      -2  -1      .0          ST  44  t   ti
 
 TA  50      1-  0-      00          SI  65  Av  ve
 
 DI  14      20  20                  DE      ai    
 
 NE  70      08  08                         la    
 
    1                               PH      bl    
 
 10                                  AR      e     
 
                                    MA            
 
 MG                                  CY            
 
                                                
 
 TA                         OF            
 
 BL                         CY         
 
 ET                      NT      
 
                         HI      
 
                  AN      
 
                A      
 
                     
 
                     
 
                  
 
                  
 
                  
 
                  
 
               
 
               
 
              
 
     49      01  04  02  60  30      EA  96  No  Ac
 
     88      -2  -1      .0          ST  44  t   ti
 
     40      1-  0-      00          SI  64  Av  ve
 
     54      20  20                  DE      ai    
 
     40      08  08                         la    
 
     2                               PH      bl    
 
                                     AR      e     
 
                                     MA            
 
                                     CY            
 
                                                 
 
                            OF            
 
                            CY         
 
                         NT      
 
                         HI      
 
                  AN      
 
                A      
 
                     
 
                     
 
               
 
               
 
               
 
               
 
               
 
               
 
              
 
     00      01  04  02  30  30      EA  96  No  Ac
 
     00      -2  -1      .0          ST  44  t   ti
 
     60      1-  0      00          SI  63  Av  ve
 
     11      20  20                  DE      ai    
 
     73      08  08                         la    
 
     1                               PH      bl    
 
                                     AR      e     
 
                                     MA            
 
                                     CY            
 
                                                 
 
                            OF            
 
                            CY         
 
                         NT      
 
                         HI      
 
                  AN      
 
                A      
 
                     
 
                     
 
               
 
               
 
               
 
               
 
               
 
               
 
              
 
     00      01  04  01  30  30      EA  96  No  Ac
 
     00      -2  -0      .0          ST  44  t   ti
 
     60      1  7      00          SI  63  Av  ve
 
     11      20  20                  DE      ai    
 
     73      08  08                         la    
 
     1                               PH      bl    
 
                                     AR      e     
 
                                     MA            
 
                                     CY            
 
                                                 
 
                            OF            
 
                            CY         
 
                         NT      
 
                         HI      
 
                  AN      
 
                A      
 
                     
 
                     
 
               
 
               
 
               
 
               
 
               
 
               
 
              
 
     49      01  04  01  60  30      EA  96  No  Ac
 
     88      -2  -0      .0          ST  44  t   ti
 
     40        7          SI  64  Av  ve
 
     54      20  20                  DE      ai    
 
     40      08  08                         la    
 
     2                               PH      bl    
 
                                     AR      e     
 
                                     MA            
 
                                     CY            
 
                                                 
 
                            OF            
 
                            CY         
 
                         NT      
 
                         HI      
 
                  AN      
 
                A      
 
                     
 
                     
 
               
 
               
 
               
 
               
 
               
 
               
 
              
 
 LO  60      01  04  01  30  30      EA  96  No  Ac
 
 RA  50      -2  -0      .0          ST  44  t   ti
 
 TA  50      1-  7-      00          SI  65  Av  ve
 
 DI  14      20  20                  DE      ai    
 
 NE  70      08  08                         la    
 
    1                               PH      bl    
 
 10                                  AR      e     
 
                                    MA            
 
 MG                                  CY            
 
                                                
 
 TA                         OF            
 
 BL                         CY         
 
 ET                      NT      
 
                         HI      
 
                  AN      
 
                A      
 
                     
 
                     
 
                  
 
                  
 
                  
 
                  
 
               
 
               
 
              
 
 NA  00      01  04  01  17  17      EA  96  No  Ac
 
 SO  08      -2  -0      .0          ST  44  t   ti
 
 NE  51        7      00          SI  61  Av  ve
 
 X   28      20  20                  DE      ai    
 
 50  80      08  08                         la    
 
    1                               PH      bl    
 
 MC                                  AR      e     
 
 G                                   MA            
 
 NA                                  CY            
 
 SA                                              
 
 L                          OF            
 
 SP                         CY         
 
 RA                      NT      
 
 Y                       HI      
 
                  AN      
 
                A      
 
                     
 
                     
 
                  
 
                  
 
                  
 
                  
 
                 
 
               
 
              
 
     00      01  03  00  30  30      EA  96  No  Ac
 
     00      -2  -2      .0          ST  44  t   ti
 
     60      1  6          SI  63  Av  ve
 
     11      20  20                  DE      ai    
 
     73      08  08                         la    
 
     1                               PH      bl    
 
                                     AR      e     
 
                                     MA            
 
                                     CY            
 
                                                 
 
                            OF            
 
                            CY         
 
                         NT      
 
                         HI      
 
                  AN      
 
                A      
 
                     
 
                     
 
               
 
               
 
               
 
               
 
               
 
               
 
              
 
     49      01  03  00  60  30      EA  96  No  Ac
 
     88      -2  -2      .0          ST  44  t   ti
 
     40        6          SI  64  Av  ve
 
     54      20  20                  DE      ai    
 
     40      08  08                         la    
 
     2                               PH      bl    
 
                                     AR      e     
 
                                     MA            
 
                                     CY            
 
                                                 
 
                            OF            
 
                            CY         
 
                         NT      
 
                         HI      
 
                  AN      
 
                A      
 
                     
 
                     
 
               
 
               
 
               
 
               
 
               
 
               
 
              
 
 AM  00      01  03  00  30  10      EA  96  No  Ac
 
 OX  78      -3  -2      .0          ST  58  t   ti
 
 IC  12      1  6          SI  70  Av  ve
 
 IL  61      20  20                  DE      ai    
 
 LI  30      08  08                         la    
 
 N   5                               PH      bl    
 
 50                                  AR      e     
 
 0                                   MA            
 
 MG                                  CY            
 
                                                
 
 CA                         OF            
 
 PS                         CY         
 
 UL                      NT      
 
 E                       HI      
 
                  AN      
 
                A      
 
                     
 
                     
 
                  
 
                  
 
                  
 
                  
 
                 
 
               
 
              
 
 LO  60      01  03  00  30  30      EA  96  No  Ac
 
 RA  50      -2  -2      .0          ST  44  t   ti
 
 TA  50      1-  6-      00          SI  65  Av  ve
 
 DI  14      20  20                  DE      ai    
 
 NE  70      08  08                         la    
 
    1                               PH      bl    
 
 10                                  AR      e     
 
                                    MA            
 
 MG                                  CY            
 
                                                
 
 TA                         OF            
 
 BL                         CY         
 
 ET                      NT      
 
                         HI      
 
                  AN      
 
                A      
 
                     
 
                     
 
                  
 
                  
 
                  
 
                  
 
               
 
               
 
              
 
 NA  00      01  03  00  17  17      EA  96  No  Ac
 
 SO  08      -2  -2      .0          ST  44  t   ti
 
 NE  51      1-  5-      00          SI  61  Av  ve
 
 X   28      20  20                  DE      ai    
 
 50  80      08  08                         la    
 
    1                               PH      bl    
 
 MC                                  AR      e     
 
 G                                   MA            
 
 NA                                  CY            
 
 SA                                              
 
 L                          OF            
 
 SP                         CY         
 
 RA                      NT      
 
 Y                       HI      
 
                  AN      
 
                A      
 
                     
 
                     
 
                  
 
                  
 
                  
 
                  
 
                 
 
               
 
              
 
 VE  00      01  03  00  18  18      EA  96  No  Ac
 
 NT  17      -2  -2      .0          ST  44  t   ti
 
 OL  30      1          SI  62  Av  ve
 
 IN  68      20  20                  DE      ai    
 
    22      08  08                         la    
 
 HF  0                               PH      bl    
 
 A                                   AR      e     
 
 90                                  MA            
 
                                    CY            
 
 MC                                              
 
 G                          OF            
 
 IN                         CY         
 
 FRANCIS                      NT      
 
 LE                      HI      
 
 R                AN      
 
                A      
 
                     
 
                     
 
                  
 
                  
 
                  
 
                  
 
                  
 
                 
 
              
 
 SE  45      01  03  00  12  4       EA  96  No  Ac
 
 LE  80      -0  -2      0.          ST  28  t   ti
 
 NI  20      9-  4-      00          SI  45  Av  ve
 
 UM  04      20  20      0           DE      ai    
 
    06      08  08                         la    
 
 [place]  4                               PH      bl    
 
 LF                                  AR      e     
 
 ID                                  MA            
 
 E                                   CY            
 
 2.                                             
 
 5%                         OF            
 
                           CY         
 
 LO                        NT      
 
 TI                      HI      
 
 ON               AN      
 
                A      
 
                     
 
                     
 
                  
 
                  
 
                  
 
                  
 
                  
 
                  
 
              
 
                                                                                
                                                                                
                                                                                
                                                                                
                                                                                
                                                                                
                                                                                
                                                                                
                                                                                
                                                                                
                                                                                
                                                                                
                                                                                
                                                                                
                                                                                
                                                                         
 
Encounters
                      
 
 
 Encounter  Start   End Date   Code       Location   Performer
 
  Type      Date                                                 
 
                                                        
 
                
 
 [facility]                [place]            
 
 -   7          7                     [facility]                [place]            
 
 -   7          7                     [facility]                [place]            
 
 -   7          7                     [facility]                [place]            
 
 -   7          7                     [facility]                [place]            
 
 -   7          7                     [facility]                [place]            
 
 -   5          5                     [facility]                [place]            
 
 -   5          5                     [facility]                [place]            
 
 -   4          4                     [facility]                [place]            
 
 -   2          2                     [facility]                [place]            
 
 -   2          2                     [facility]                [place]            
 
 -   1          1                     [facility]                [place]            
 
 -   9          9                     [facility]                [place]            
 
 -   8          8                     [facility]

## 2017-11-19 NOTE — [facility] REPORT
History of Present Issue
Date/Time Seen by Provider 11/19/17 9557
Visit Reason
Pt arrived:Walked    
Presenting Problem:PT C/O SORE THROAT X1 WEEK 
Location if Accident:
Onset of symptoms date/time:/ or onset unknown for:MEDICAL HX UNKNOWN
 
Have you (or family members/close friends) recently traveled outside the [place]? N
If Yes, where/when: 
Have you had exposure to infectious disease within the past month?  TB? 
Other?  Specify: 
 
Patient state that she has had sore throat for over a week States that she was 
seen at [facility] on Tuesday and given Amoxicillin however her throat is 
still sore and she wanted to come in and make sure that it has not turned into 
strep throat 
ALLERGIES
Coded Allergies:
No Known Allergies (06/21/16)
 
Home Medications
Active Scripts
ALBUTEROL (Proventil Hfa Inhaler) 1-2 PUFF IH Q4-6H PRN 
     #1 CAN Ref 1
     Prov:      05/11/17
Azithromycin (Zithromycin (Z-BOUCHRA) 250MG Tab******) 250 MG PO DAILY 
     #4 TAB 
     Prov:      08/03/17
Benzonatate (Tessalon Perle) 100 MG PO TID 
     #15 SGL 
     Prov:      08/03/17
NAPROXEN (NAPROSYN 500MG TAB*****) 500 MG PO BID 
     #28 TAB 
     Prov:      07/17/17
 
Reported Medications
D-METHORPHAN HB/P-EPD HCL/BPM (Iyeasxrzxa-Eyibqdnasem-Nv Syr) 473 ML PO BID 
     #400 
 
 
 
History
 
Medical History
General
   CAD? No
   Angina: No
   MI: No
   Hypertension? No
   Hyperlipidemia? No
   CHF? No
   DVT? No
   PE? No
   COPD? No
   Asthma? No
   Anemia? No
   GERD? No
   Gastric ulcers? No
   GI Bleed? No
   Hernia? No
   Thyroid Problems? No
   Hypothyroidism? No
   CVA? No
   Seizures? No
   Diabetes? No
   Renal Insuffiency? No
   UTI? No
   Stones? No
   BPH? No
   GB Disease: No
   Nephritic Syndrome? No
   Asplenia? No
   Hepatitis? No
   Sickle Cell Disease? No
   Arthritis? No
   Migraines? No
   Cataracts? No
   Glaucoma? No
   MRSA? No
   HIV? No
   TB? No
   Anxiety? No
   Depression? No
   Cancer? No
   More? Yes
   Additional hx:
denies possibility of pregnancy
Immunization HX
   DT/Tetanus UNKNOWN
Surgical Hx
Previous Surgery?Y  EAR TUBES   T&A      
              
            
 
 
Social History
Smoking Hx
   Smoker: Never Smoker
   Tobacco: No
Alcohol
   Alcohol: No
 
Review of Systems
All Other Systems Reviewed and Negative
ENT
throat pain. 
Respiratory
cough
 
Physical Exam
Vital Signs
 Vital Signs
 
 
Date Time Temp Pulse Resp B/P Pulse O2 O2 Flow FiO2
 
     Ox Delivery Rate 
 
11/19 1618 98.2 106 20 131/96 99   
 
 
General Appearance normal appearance, WD/WN, no apparent distress
Ear, Nose, Throat Throat red, irriated drainage noted
Respiratory Status
Yes: trachea midline, chest symmetrical, non tender chest.  No: respiratory 
distress. 
Cardiovascular normal exam, regular rate/rhythm, no peripheral edema
Neurologic alert, normal exam, oriented x 3
 
Medical Decision Making
 
LABS/Meds/Orders
Pt receiving controlled substance in ED? No
Results/Orders
 Laboratory Tests
 
 
11/19/17 1615:
Group A Strep Screen NOT DETECTED
 Orders
 
 
Procedure Date/time Status
 
[facility] STREP SCREEN 11/19 1615 Complete
 
 
 
Departure
 
Departure
Time of Disposition 1645
Disposition DC Home or Self Care(routine)
Clinical Impression
Primary Impression: Pharyngitis
Qualifiers:  Pharyngitis/tonsillitis etiology: unspecified etiology Qualified 
Code: J02.9 - Acute pharyngitis, unspecified
Condition STABLE
Referrals
[facility] (Family): 3 Days-Call Office
if no improvement
 
Patient Instructions Sore Throat
Additional Instructions
* Monitor Temp. Tylenol and/or Ibuprofen as needed. ER if fever is no less than 
101 despite alternating Tylenol and Ibuprofen
* Encourage fluids, water, Gatorade, powerade, pedialyte if infant/toddler/or 
child
* Warm salt water gargles for throat irritation
*Warm fluids 
*Sore throat lozenges
*Sleep elevated
*humidifier or vaporizer
Warm tea with honey will help to soothe the throat
Lots of rest
Increase fluids, water, Gatorade, powerade
*Your throat swab was sent to lab for culture. Those results area typically sent
to your primary care physician. Be sure to follow up in 2-3 days if no 
improvement so they can review those results and treat if necessary If you dont
have primary care I recommend you get one, but in the mean time you will have to
return to a walk in clinic
** Follow up IMMEDIATELY for new or worsening of symptoms OR no noticeable 
improvement over the next 48-72 hours. 911 immediately for any life threatening 
symptoms such as chest pain or difficulty breathing
 
Continue taking antibiotics 
Follow up with family doctor if symptoms continue to worsen
REturn if needed
Discharge Counseling
   Counseled pt/family regarding diagnosis, test results, home care, follow up 
needs
 
Electronically Signed by LIYAH BENJAMIN on 11/19/17 at 5855

## 2017-11-19 NOTE — EXTERNAL MEDICAL SUMMARY RPT
[employer]
 Created on: 2017
 
JAMMIE SIMMONS
External Reference #: [id number]
: 89
Sex: Female
 
Demographics
 
 
 
 Address  [address]  [address]
 
 Preferred Language  English
 
 Marital Status  Unknown
 
 Confucianism Affiliation  Unknown
 
 Race  Unknown
 
 Ethnic Group  Unknown
 
 
Author
 
 
 
 Author            ,            FLORIN RODRIGUEZLEVI
 
 Address  Unknown
 
 Phone  [email or web address]
 
 
 
Care Team Providers
 
 
 
 Care Team Member Name  Role  Phone
 
 YAHIR CARDOZA, YAHIR CARDOZA   Unavailable  Unavailable
 
 LAZARO EMILY, LAZARO   Unavailable  Unavailable
 
 EMILY   
 
 UMESH MILIND,   Unavailable  Unavailable
 
 UMESH MILIND   
 
 MARK VISION,   Unavailable  Unavailable
 
 MARK VISION   
 
 [facility]   Unavailable  Unavailable
 
 [place], [facility]   
 
 [facility] [place]  
 
    
 
 [facility]   Unavailable  [place]available
 
 [place] [facility]  
 
  [facility] [place]  
 
    
 
 ENEDELIA HANNAH,   Unavailable  Unavailable
 
 ENEDELIA HANNAH   
 
 [facility]   Unavailable  Unavailable
 
 [place], [facility]   Unavailable  Unavailable
 
 CENTER, [facility]   Unavailable  Unavailable
 
 INC, [facility]   
 
 [facility]   Unavailable  Unavailable
 
 HOSPITAL, [facility],   Unavailable  Unavailable
 
 [facility]   
 
 [facility],  Unavailable  Unavailable
 
  [facility]  
 
    
 
 [facility]   Unavailable  Unavailable
 
 [facility], [facility]   
 
 DYLLAN SOM,   Unavailable  Unavailable
 
 DYLLAN SOM   
 
 DYLLAN, SOM B,   Unavailable  Unavailable
 
 DYLLAN, SOM B   
 
 MUSIC RONDA, MUSIC RONDA   Unavailable  Unavailable
 
 [facility],   Unavailable  Unavailable
 
 [facility]   Unavailable  Unavailable
 
 [facility]   
 
 [facility]   
 
 GRIMALDO DON,   Unavailable  Unavailable
 
 GRIMALDO BOSTON HEWITT R,   Unavailable  Unavailable
 
 GRIMALDOBOSTON KHAN R   
 
 [facility]   Unavailable  Unavailable
 
 [address]   
 
 [facility] [address]   
 
 [facility]   Unavailable  Unavailable
 
 [place]   
 
 [facility]   
 
 KATHLEEN III MESERET,   Unavailable  Unavailable
 
 KATIEMAN III MESERET   
 
                                            
 
Purpose
                      Continuity of Care Document - 2008 through 2017                                                                            
                     
 
Problems
                      
 
 
 Code         Diagnosis    DOS          Provider     Status     
 
                                                               
 
               
 
 J00          ACUTE   2017   [facility]              
 
              NASOPHARYNG               PHYSICIAN              
 
      ITIS COMMON          GROUP                   
 
   COLD                       
 
                  
 
      
 
         CARPAL   2017   [facility]              
 
              TUNNEL                PHYSICIANS              
 
      SYNDROME           GROUP                   
 
  BILATERAL                  
 
  UPPER LIMBS     
 
                
 
            
 
         LESION OF   2017   [facility]              
 
              ULNAR NERVE               PHYSICIANS              
 
       BILATERAL           GROUP                   
 
  UPPER LIMBS                 
 
                  
 
            
 
         ENCOUNTER   2017   [facility]              
 
              FOR                PHYSICIANS              
 
      SURVEILLANC          GROUP                   
 
  E                  
 
  CONTRACEPTI     
 
  VES UNS       
 
                
 
        
 
 J209         ACUTE   2017   [facility]              
 
              BRONCHITIS                PHYSICIANS,             
 
      UNSPECIFIED           PLLC                   
 
                              
 
              
 
 R05          COUGH        2017   [facility]              
 
                                        PHYSICIANS,             
 
                  [facility]                   
 
                  
 
      
 
 R509         FEVER   2017   [place]              
 
              UNSPECIFIED               MEDICAL              
 
                           IMAGING ASS             
 
                          
 
            
 
 J028         ACUTE   2017   [facility]              
 
              PHARYNGITIS               PHYSICIANS              
 
       DUE TO           GROUP                   
 
  OTHER SPEC                  
 
  ORGANISMS       
 
                
 
          
 
 Z111         ENCOUNTER   2017   [facility]              
 
              SCREENING                PHYSICIAN              
 
      FOR           GROUP                   
 
  RESPIRATORY                 
 
        
 
  TUBERCULOSI   
 
  S             
 
             
 
 J029         ACUTE   2017   [facility]              
 
              PH[facility]NGITIS               [facility]              
 
                 INC                     
 
  UNSPECIFIED                
 
                
 
            
 
 J020         STREPTOCOCC  2017   [facility]              
 
              AL                [facility]              
 
      PHARYNGITIS          INC                     
 
                             
 
            
 
 J069         ACUTE UPPER  2016   [facility]              
 
                              PHYSICIANS              
 
      RESPIRATORY          GROUP                   
 
   INFECTION                  
 
  UNSPECIFIED     
 
                
 
            
 
 [id number]       CELLULITIS   2016   [facility]              
 
              OF RIGHT                PHYSICIANS,             
 
      LOWER LIMB            PLLC                   
 
                              
 
             
 
         ENCOUNTER   2016   [facility]              
 
              SURVEILLANC               PHYSICIANS              
 
      E           GROUP                   
 
  INJECTABLE                  
 
  CONTRACEPTI     
 
  VE            
 
              
 
 [id number]       ACUTE   2016   [facility]              
 
              SUPPURATIVE               PHYSICIANS              
 
       OM W/O           GROUP                   
 
  RUPT EAR                  
 
  DRUM UNS      
 
  EAR           
 
               
 
 R102         PELVIC AND   2016   [facility]              
 
              PERINEAL                PHYSICIANS              
 
      PAIN                 GROUP                   
 
                              
 
      
 
         LEFT LOWER   2016   [facility]              
 
              QUADRANT                PHYSICIANS              
 
      PAIN                 GROUP                   
 
                              
 
      
 
         ENCOUNTER   2015   [facility]              
 
              FOR                PHYSICIANS              
 
      SURVEILLANC          GROUP                   
 
  E OTHER                  
 
  CONTRACEPTI     
 
  VES           
 
               
 
         HIGH RISK   2015   [facility]              
 
              HETEROSEXUA               [facility]              
 
      L BEHAVIOR           INC                     
 
                             
 
           
 
 7804         DIZZINESS   2015   [facility]              
 
              AND                PHYSICIANS              
 
      GIDDINESS            GROUP                   
 
                              
 
            
 
 7850         UNSPECIFIED  2015   [facility]              
 
                              PHYSICIANS              
 
      TACHYCARDIA          GROUP                   
 
                              
 
              
 
 48336        CHEST PAIN   2015   [facility]              
 
              UNSPECIFIED               PHYSICIANS              
 
                           GROUP                   
 
                          
 
      
 
 09332        ACUTE   2015   [facility]              
 
              SANGUINOUS                MEMORIAL              
 
      OTITIS           [facility]                
 
  MEDIA                       
 
                     
 
      
 
 5990         URINARY   2015   [facility]              
 
              TRACT                PHYSICIANS              
 
      INFECTION           GROUP                   
 
  SITE NOT                  
 
  SPECIFIED       
 
                
 
          
 
 6268         OTH D/O   2015   [facility]              
 
              MENSTRUATIO               PHYSICIANS              
 
      N&OTH ABN           GROUP                   
 
  BLEED FE                  
 
  GNT TRACT       
 
                
 
          
 
 84050        INFLUENZA   2015   [place]              
 
              IDENT [facility]                
 
  A RESP                  
 
  MANIFEST           
 
                
 
         
 
 4660         ACUTE   2015   [facility]              
 
              BRONCHITIS                PHYSICIANS              
 
                           GROUP                   
 
                         
 
      
 
 [id number]        ABDOMINAL   2014   [facility]              
 
              PAIN RIGHT                PHYSICIANS              
 
      LOWER           GROUP                   
 
  QUADRANT                    
 
                  
 
         
 
 V741         SCREENING   10-   [facility]              
 
              EXAMINATION               PHYSICIANS              
 
       FOR           GROUP                   
 
  PULMONARY                  
 
  TUBERCULOSI     
 
  S             
 
             
 
 39749        SHORTNESS   2014   [facility]              
 
              OF BREATH                 PHYSICIANS              
 
                           GROUP                   
 
                        
 
      
 
 5368         DYSPEPSIA&O  2014   [facility]              
 
              THER SPEC                [facility]              
 
    DISORDERS           [place]                 
 
  FUNCTION                  
 
  STOMACH           
 
                
 
        
 
 78377        NAUSEA   2014   [facility]              
 
              ALONE                     [facility]              
 
                         [place]                 
 
                    
 
        
 
 69067        NAUSEA WITH  2014   [facility]              
 
               VOMITING                 [facility]              
 
                         [place]                 
 
                        
 
        
 
 52189        PAIN IN   2014   [facility]              
 
              JOINT, SITE               [facility]              
 
               [place]                 
 
  UNSPECIFIED                 
 
                    
 
            
 
 6253         DYSMENORRHE  2014   [facility]              
 
              A                         [facility]              
 
                      [place]                 
 
                  
 
        
 
 7840         HEADACHE     2014   [facility]              
 
                                        TH DEPT              
 
                  [place]                 
 
                  
 
        
 
 97982        FEVER   2014   [facility]              
 
              UNSPECIFIED               [facility]              
 
                         [place]                 
 
                          
 
        
 
 462          ACUTE   2014   [facility]              
 
              PHARYNGITIS               PHYSICIANS              
 
                           GROUP                   
 
                          
 
      
 
 13126        FEVER   2014   [facility]              
 
              PRESENTING                PHYSICIANS              
 
      CONDITIONS           GROUP                   
 
  CLASSIFIED                  
 
  ELSEWHERE       
 
                
 
          
 
         SURVEILLANC  2013   [facility]             
 
              E OT PREV                 HEALTH              
 
    PRSC           CENTER                  
 
  CONTRACEPT                  
 
  METHOD           
 
                
 
       
 
         OTHER   2013   [facility]             
 
              [facility]                 HEALTH              
 
    PROCREATIVE          CENTER                  
 
   MANAGEMENT                 
 
                   
 
            
 
 86449        DIARRHEA     2013   YAHIR SONY               
 
                                                                
 
                                    
 
      
 
 V700         ROUTINE   2013   [facility]              
 
              GENERAL                [facility]                     
 
    MEDICAL                                  
 
  EXAM@HEALTH     
 
   CARE FACL    
 
                
 
           
 
 1110         PITYRIASIS   2013   MUSIC RONDA               
 
              VERSICOLOR                                        
 
                                           
 
             
 
 V255         INSERTION   2012   IVETH DIAZ              
 
              OF                                        
 
    IMPLANTABLE                             
 
   SUBDERMAL      
 
  CONTRACEPTI   
 
  VE            
 
              
 
 2662         OTHER   2012   [facility]             
 
              [facility]                 HEALTH              
 
    DEFICIENCIE          CENTER                  
 
  S                           
 
                
 
 43706        UNSPECIFIED  2012   [facility]             
 
               VIRAL                 [facility]                    
 
    INFECTION                                   
 
  IN CCE &      
 
  UNS SITE      
 
                
 
         
 
 490          BRONCHITIS   2011   [facility]             
 
              NOT                 [facility]                    
 
    SPECIFIED                                   
 
  AS ACUTE OR     
 
   CHRONIC      
 
                
 
         
 
 23394        ASTHMA   2011   [facility]             
 
              UNSPECIFIED                [facility]                    
 
     WITH                                   
 
  EXACERBATIO     
 
  N             
 
             
 
 13204        ACUTE   2011   [facility]             
 
              BRONCHOSPAS                [facility]                    
 
    M                                            
 
               
 
 42395        OTHER   2011   [facility]              
 
              DYSPNEA AND               MIILND                     
 
                                      
 
  RESPIRATORY     
 
      
 
  ABNORMALITI   
 
  ES            
 
              
 
 4659         ACUTE URIS   10-   [facility]              
 
              OF                DON                     
 
    UNSPECIFIED                                 
 
   SITE           
 
                
 
      
 
 79618        OTHER   2011   [facility]              
 
              SPECIFIED                DON                     
 
    DISORDERS                                  
 
  OF URINARY      
 
  TRACT         
 
                
 
      
 
 5589         OTH&UNSPEC   2011   [facility]              
 
              NONINFECTIO               DON                     
 
    US                                  
 
  GASTROENTER     
 
  ITIS&COLITI   
 
  S             
 
             
 
         OT GENERAL  2011   [facility]             
 
                               HEALTH              
 
    CNSL&ADVICE          CENTER                  
 
   CONTRACEPT                 
 
   MANAGEMENT      
 
                
 
            
 
 3670         HYPERMETROP  2011   [facility]              
 
              IA                        VISION                  
 
                                               
 
         
 
 4778         ALLERGIC   2011   [facility]              
 
              RHINITIS                SOM                     
 
    DUE TO                                  
 
  OTHER      
 
  ALLERGEN      
 
                
 
         
 
 7862         COUGH        2011   [facility]              
 
                                        SOM                     
 
                                      
 
      
 
 25642        EXTRINSIC   2011   [facility]              
 
              ASTHMA,                SOM                     
 
    WITH                                  
 
  EXACERBATIO     
 
  N             
 
             
 
         GENERAL   10-   [facility]             
 
              CNSL                 HEALTH              
 
    INITIATION           CENTER                  
 
  OT                  
 
  CONTRACEPT       
 
  MEASURES      
 
                
 
         
 
         PREGNANCY   10-   [facility]             
 
              EXAMINATION                HEALTH              
 
     OR TEST           CENTER                  
 
  NEGATIVE                  
 
  RESULT           
 
                
 
       
 
 7881         DYSURIA      2010   [facility]              
 
                                        DON                     
 
                                        
 
      
 
 3829         UNSPECIFIED  2010   [facility],              
 
               OTITIS                DON R                   
 
    MEDIA                                        
 
                    
 
      
 
 48450        OTHER   2010   [facility],              
 
              CHRONIC                SOM B                   
 
    ALLERGIC                                   
 
  CONJUNCTIVI       
 
  TIS           
 
               
 
 94410        ESOPHAGEAL   2010   [facility],              
 
              REFLUX                    SOM B                   
 
                                                 
 
           
 
 4779         ALLERGIC   2009   [facility],              
 
              RHINITIS                DON R                   
 
    CAUSE                                   
 
  UNSPECIFIED       
 
                
 
            
 
 6262         EXCESSIVE   2008   [facility],              
 
              OR FREQUENT               DON R                   
 
                                       
 
  MENSTRUATIO       
 
  N             
 
             
 
 7831         ABNORMAL   2008   [facility]              
 
              WEIGHT GAIN               [facility]              
 
                         INC                     
 
                         
 
 V069         NEED PROPH   2008   [facility]              
 
              VACCINATION               HEALTH              
 
     W/UNSPEC           CENTRAL              
 
  COMB    BANK ACCT     
 
  VACCINE                   
 
                      
 
        
 
 0340         STREPTOCOCC  2008   [facility]             
 
              [facility]                 [facility]             
 
    THROAT                                       
 
                          
 
       
 
 7821         RASH AND   2008   [facility],              
 
              OTHER                DON R                   
 
    NONSPECIFIC                                  
 
   SKIN        
 
  ERUPTION      
 
                
 
         
 
 6828         CELLULITIS   2008   [facility],              
 
              AND ABSCESS               DON R                   
 
     OF OTHER                                   
 
  SPECIFIED        
 
  SITE          
 
                
 
                                                                                
                                                                                
                                                                                
                                                                                
                                                                                
                                                                                
                                                                                
                                                                                
                                                                                
                                                                               
 
Medications
                      
 
 
 Na  ND  Rx  Da  Fi  Fi  Am  Da  Di  Ph  RX  Ph  St
 
 me  C   No  te  ll  ll  ou  ys  ag  ar   #  ys  at
 
         rm        s   nt      no  ma      ic  us
 
             Or  Da              si  cy      ia    
 
             de  te              s           n     
 
             re                                    
 
             d                                     
 
                                                   
 
                                                   
 
                                                   
 
                                                  
 
                                   
 
                           
 
                      
 
               
 
              
 
 VI  65      10  11      4.  28          00  [place]  Ac
 
 T   38      -1  -1      00              00  L-  ti
 
 D2  00      2-  7-      0               07  MA  ve
 
    73      20  20                      51  RT    
 
 1.  70      17  17                      50       
 
 25  6                                   83  PH    
 
                                            AR    
 
 MG                                          MA    
 
                                            CY    
 
 (5                                              
 
 0,                                    #5    
 
 00                                   91 
 
 0                                     
 
 UN                                    
 
 IT                             
 
 )                         
 
                           
 
                           
 
                  
 
                  
 
                  
 
               
 
               
 
               
 
              
 
 SV  81      10  11      30  30          00  [place]  Ac
 
    13      -1  -1      .0              00  L-  ti
 
 VI  10      2-  7-      00              08  MA  ve
 
 TA  31      20  20                      84  RT    
 
 MI  27      17  17                      15       
 
 N   1                                   82  PH    
 
 D3                                          AR    
 
                                            MA    
 
 5,                                          CY    
 
 00                                              
 
 0                                     #5    
 
 UN                                    91 
 
 IT                                    
 
                                      
 
 SF                             
 
 TG                        
 
 L                         
 
                           
 
                  
 
                  
 
                  
 
               
 
               
 
               
 
               
 
              
 
 NA  65      10  11      60  30          00  [place]  Ac
 
 CO  16      -1  -1      .0              00  L-  ti
 
 OX  20      2-  7-      00              07  MA  ve
 
 EN  19      20  20                      51  RT    
 
    01      17  17                      50       
 
 50  1                                   84  PH    
 
 0                                           AR    
 
 MG                                          MA    
 
                                            CY    
 
 TA                                              
 
 BL                                    #5    
 
 ET                                    91 
 
                                       
 
                                       
 
                                
 
                           
 
                           
 
                           
 
                  
 
                  
 
                  
 
 [place]  60      10  11      5.  7           00  [place]  Ac
 
 LF  75      -1  -1      00              00  L-  ti
 
 AC  80      6-  7-      0               07  MA  ve
 
 ET  01      20  20                      51  RT    
 
 AM  80      17  17                      56       
 
 ID  5                                   63  PH    
 
 E                                           AR    
 
 10                                          MA    
 
 %                                           CY    
 
 EY                                             
 
 E                                     #5    
 
 DR                                   91 
 
 OP                                    
 
 S                                     
 
                                
 
                           
 
                           
 
                           
 
                  
 
                  
 
                  
 
               
 
              
 
 AZ  50      10  11      6.  5           00  [place]  Ac
 
 IT  11      -1  -1      00              00  L-  ti
 
 HR  10      6-  7-      0               07  MA  ve
 
 OM  78      20  20                      51  RT    
 
 YC  75      17  17                      56       
 
 IN  1                                   62  PH    
 
                                            AR    
 
 25                                          MA    
 
 0                                           CY    
 
 MG                                             
 
                                      #5    
 
 TA                                   91 
 
 BL                                    
 
 ET                                    
 
                                
 
                           
 
                           
 
                           
 
                  
 
                  
 
                  
 
               
 
               
 
 LO  00      09  11      30  30          00  [place]  Ac
 
 RA  78      -2  -0      .0              00  L-  ti
 
 TA  15      9-  3-      00              08  MA  ve
 
 DI  07      20  20                      84  RT    
 
 NE  70      17  17                      13       
 
    1                                   82  PH    
 
 10                                          AR    
 
                                            MA    
 
 MG                                          CY    
 
                                                
 
 TA                                    #5    
 
 BL                                    91 
 
 ET                                    
 
                                       
 
                                
 
                           
 
                           
 
                           
 
                  
 
                  
 
                  
 
               
 
 CO  00      09  11      20  10          00  [place]  Ac
 
 OM  60      -2  -0      0.              00  L-  ti
 
 ET  31      9-  3-      00              07  MA  ve
 
 HA  58      20  20      0               51  RT    
 
 ZI  65      17  17                      26       
 
 NE  8                                   76  PH    
 
 -D                                          AR    
 
 M                                           MA    
 
 SY                                          CY    
 
 RU                                              
 
 P                                     #5    
 
                                       91 
 
                                         
 
                                       
 
                                
 
                           
 
                           
 
                           
 
                  
 
                 
 
               
 
 ME  59      08  09      1.  90          00  CL  Ac
 
 DR  76      -1  -2      00              00  IN  ti
 
 OX  24      8-  2-      0               00  IC  ve
 
 YP  53      20  20                      40       
 
 RO  80      17  17                      75  PH    
 
 GE  2                                   72  AR    
 
 ST                                          MA    
 
 ER                                          CY    
 
 ON                                                
 
 E                                                
 
 15                                          
 
 0                                       
 
 MG                                    
 
 /M                                    
 
 L                              
 
                           
 
                           
 
                        
 
               
 
               
 
               
 
               
 
               
 
              
 
 BE  68      08  09      15  5           00  [facility]
 
 [place]  38      -0  -0      .0              00  L-  ti
 
 ON  20      3-  8-      00              07  MA  ve
 
 AT  24      20  20                      50  RT    
 
 AT  70      17  17                      20       
 
 E   1                                   58  PH    
 
 10                                          AR    
 
 0                                           MA    
 
 MG                                          CY    
 
                                               
 
 CA                                    #5    
 
 PS                                    91 
 
 UL                                    
 
 E                                     
 
                                
 
                           
 
                           
 
                           
 
                  
 
                  
 
                  
 
               
 
              
 
 AZ  59      08  09      4.  4           00  [place]  Ac
 
 IT  76      -0  -0      00              00  L-  ti
 
 HR  23      3-  8-      0               07  MA  ve
 
 OM  06      20  20                      50  RT    
 
 YC  00      17  17                      20       
 
 IN  2                                   59  PH    
 
                                            AR    
 
 25                                          MA    
 
 0                                           CY    
 
 MG                                             
 
                                      #5    
 
 TA                                   91 
 
 BL                                    
 
 ET                                    
 
                                
 
                           
 
                           
 
                           
 
                  
 
                  
 
                  
 
               
 
               
 
 BR  60      07  08      40  7           00  [place]  Ac
 
 OM  43      -2  -2      0.              00  L-  ti
 
 PH  20      6-  5-      00              07  MA  ve
 
 EN  27      20  20      0               50  RT    
 
 IR  51      17  17                      08       
 
 -P  6                                   19  PH    
 
 SE                                          AR    
 
 UD                                          MA    
 
 OE                                          CY    
 
 PH                                             
 
 ED                                    #5    
 
 -D                                    91 
 
 M                                       
 
 SY                                    
 
 R                              
 
                           
 
                           
 
                           
 
                  
 
                  
 
                  
 
               
 
               
 
              
 
 NA  65      07  08      28  14          00  [place]  Ac
 
 CO  16      -1  -1      .0              00  L-  ti
 
 OX  20      8-  8-      00              07  MA  ve
 
 EN  19      20  20                      49  RT    
 
    01      17  17                      93       
 
 50  1                                   43  PH    
 
 0                                           AR    
 
 MG                                          MA    
 
                                            CY    
 
 TA                                              
 
 BL                                    #5    
 
 ET                                    91 
 
                                       
 
                                       
 
                                
 
                           
 
                           
 
                           
 
                  
 
                  
 
                  
 
 ME  59      05  06      1.  90          00  CL  Ac
 
 DR  76      -2  -2      00              00  IN  ti
 
 OX  24      4-  3-      0               00  IC  ve
 
 YP  53      20  20                      40       
 
 RO  80      17  17                      75  PH    
 
 GE  2                                   72  AR    
 
 ST                                          MA    
 
 ER                                          CY    
 
 ON                                                
 
 E                                                
 
 15                                          
 
 0                                       
 
 MG                                    
 
 /M                                    
 
 L                              
 
                           
 
                           
 
                        
 
               
 
               
 
               
 
               
 
               
 
              
 
 BR  60      05  06      15  3           00  [place]  Ac
 
 OM  43      -1  -1      0.              00  L-  ti
 
 PH  20      1-  6-      00              07  MA  ve
 
 EN  27      20  20      0               48  RT    
 
 IR  51      17  17                      74       
 
 -P  6                                   48  PH    
 
 SE                                          AR    
 
 UD                                          MA    
 
 OE                                          CY    
 
 PH                                             
 
 ED                                    #5    
 
 -D                                    91 
 
 M                                       
 
 SY                                    
 
 R                              
 
                           
 
                           
 
                           
 
                  
 
                  
 
                  
 
               
 
               
 
              
 
 VE  00      05  06      18  17          00  [place]  Ac
 
 NT  17      -1  -1      .0              00  L-  ti
 
 OL  30      1-  6-      00              07  MA  ve
 
 IN  68      20  20                      48  RT    
 
    22      17  17                      74       
 
 HF  0                                   50  PH    
 
 A                                           AR    
 
 90                                          MA    
 
                                            CY    
 
 MC                                              
 
 G                                     #5    
 
 IN                                    91 
 
 [place]                                    
 
 LE                                    
 
 R                              
 
                           
 
                           
 
                           
 
                  
 
                  
 
                  
 
               
 
               
 
              
 
 CE  68      05  06      20  10          00  [place]  Ac
 
 FD  18      -1  -1      .0              00  L-  ti
 
 IN  00      2-  6-      00              07  MA  ve
 
 IR  71      20  20                      48  RT    
 
    16      17  17                      76       
 
 30  0                                   54  PH    
 
 0                                           AR    
 
 MG                                          MA    
 
                                            CY    
 
 CA                                              
 
 PS                                    #5    
 
 UL                                    91 
 
 E                                     
 
                                       
 
                                
 
                           
 
                           
 
                           
 
                  
 
                  
 
                  
 
              
 
 AZ  59      05  06      6.  5           00  [place]  Ac
 
 IT  76      -1  -1      00              00  L-  ti
 
 HR  23      6-  6-      0               07  MA  ve
 
 OM  06      20  20                      48  RT    
 
 YC  00      17  17                      81       
 
 IN  1                                   07  PH    
 
                                            AR    
 
 25                                          MA    
 
 0                                           CY    
 
 MG                                             
 
                                      #5    
 
 TA                                   91 
 
 BL                                    
 
 ET                                    
 
                                
 
                           
 
                           
 
                           
 
                  
 
                  
 
                  
 
               
 
               
 
 BE  68      05  06      15  5           00  [place]  Ac
 
 [place]  38      -1  -1      .0              00  L-  ti
 
 ON  20      6-  6-      00              07  MA  ve
 
 AT  24      20  20                      48  RT    
 
 AT  70      17  17                      81       
 
 E   1                                   08  PH    
 
 10                                          AR    
 
 0                                           MA    
 
 MG                                          CY    
 
                                               
 
 CA                                    #5    
 
 PS                                    91 
 
 UL                                    
 
 E                                     
 
                                
 
                           
 
                           
 
                           
 
                  
 
                  
 
                  
 
               
 
              
 
 ME  59      03  03      1.  90          00  CL  Ac
 
 DR  76      -0  -3      00              00  IN  ti
 
 OX  24      1-  1-      0               00  IC  ve
 
 YP  53      20  20                      40       
 
 RO  80      17  17                      75  PH    
 
 GE  2                                   72  AR    
 
 ST                                          MA    
 
 ER                                          CY    
 
 ON                                                
 
 E                                                
 
 15                                          
 
 0                                       
 
 MG                                    
 
 /M                                    
 
 L                              
 
                           
 
                           
 
                        
 
               
 
               
 
               
 
               
 
               
 
              
 
 BR  60      12  01      20  4           00  [place]  Ac
 
 OM  43      -1  -1      0.              00  L-  ti
 
 PH  20      4-  3-      00              07  MA  ve
 
 EN  27      20  20      0               45  RT    
 
 IR  51      16  17                      85       
 
 -P  6                                   18  PH    
 
 SE                                          AR    
 
 UD                                          MA    
 
 OE                                          CY    
 
 PH                                             
 
 ED                                    #5    
 
 -D                                    91 
 
 M                                       
 
 SY                                    
 
 R                              
 
                           
 
                           
 
                           
 
                  
 
                  
 
                  
 
               
 
               
 
              
 
 ME  59      12  01      1.  90          00  CL  Ac
 
 DR  76      -0  -0      00              00  IN  ti
 
 OX  24      7-  9-      0               00  IC  ve
 
 YP  53      20  20                      40       
 
 RO  80      16  17                      75  PH    
 
 GE  2                                   72  AR    
 
 ST                                          MA    
 
 ER                                          CY    
 
 ON                                                
 
 E                                                
 
 15                                          
 
 0                                       
 
 MG                                    
 
 /M                                    
 
 L                              
 
                           
 
                           
 
                        
 
               
 
               
 
               
 
               
 
               
 
              
 
 FL  00      10  10  1   16  30      EA  24  ST  Ac
 
 UT  05      -1  -1      .0          ST  57  EP  ti
 
 IC  43      9-  9-      00          SI  37  HE  ve
 
 AS  27      20  20                  DE      NS    
 
 ON  09      11  11                              
 
 E   9                               PH      DO    
 
 CO                                  AR      N     
 
 OP                                  MA      R     
 
                                    CY            
 
 50                                             
 
                           OF            
 
 MC                                   
 
 G                       CY      
 
 SP                      NT      
 
 RA               HI      
 
 Y              AN      
 
                A      
 
                     
 
                  
 
                  
 
                  
 
                  
 
                  
 
                  
 
                 
 
              
 
 BR  60      10  10  1   12  3       EA  24  ST  Ac
 
 OM  43      -1  -1      0.          ST  57  EP  ti
 
 FE  20      9  9-      00          SI  38  HE  ve
 
 D   83      20  20      0           DE      NS    
 
 DM  71      11  11                              
 
    6                               PH      DO    
 
 CO                                  AR      N     
 
 UG                                  MA      R     
 
 H                                   CY            
 
 SY                                            
 
 RU                         OF            
 
 P                                    
 
                           CY      
 
                         NT      
 
                  HI      
 
                AN      
 
                A      
 
                     
 
                  
 
                  
 
                 
 
               
 
               
 
               
 
               
 
              
 
 CO  00      06  06  0   16  3       EA  23  ST  Ac
 
 OM  78      -2  -2      .0          ST  09  EP  ti
 
 ET  11      7  7          SI  79  HE  ve
 
 HA  83      20  20                  DE      NS    
 
 ZI  01      11  11                              
 
 NE  0                               PH      DO    
 
                                    AR      N     
 
 25                                  MA      R     
 
                                    CY            
 
 MG                                            
 
                           OF            
 
 TA                                   
 
 BL                      CY      
 
 ET                      NT      
 
                  HI      
 
                AN      
 
                A      
 
                     
 
                  
 
                  
 
                  
 
                  
 
                  
 
               
 
               
 
              
 
 [place]  50      06  06  0   12  6       EA  23  ST  Ac
 
 LF  38      -2  -2      0.          ST  09  EP  ti
 
 AM  30      7  7      00          SI  80  HE  ve
 
 ET  82      20  20      0           DE      NS    
 
 HO  31      11  11                              
 
 XA  6                               PH      DO    
 
 ZO                                  AR      N     
 
 LE                                  MA      R     
 
 -T                                  CY            
 
 MP                                            
 
                           OF            
 
 [place]                                   
 
 SP                        CY      
 
                         NT      
 
                  HI      
 
                AN      
 
                A      
 
                     
 
                  
 
                  
 
                  
 
                  
 
               
 
               
 
               
 
              
 
 AM  00      05  05  0   30  10      EA  22  ST  Ac
 
 OX  78      -2  -2      .0          ST  65  EP  ti
 
 IC  12      4-  4      00          SI  59  HE  ve
 
 IL  02      20  20                  DE      NS    
 
 LI  00      11  11                              
 
 N   5                               PH      DO    
 
 25                                  AR      N     
 
 0                                   MA      R     
 
 MG                                  CY            
 
                                               
 
 CA                         OF            
 
 PS                                   
 
 UL                      CY      
 
 E                       NT      
 
                  HI      
 
                AN      
 
                A      
 
                     
 
                  
 
                  
 
                  
 
                  
 
                 
 
               
 
               
 
              
 
 CO  00      02  02  0   12  4       EA  21  ST  Ac
 
 OM  78      -2  -2      .0          ST  40  EP  ti
 
 ET  11      5-  5          SI  90  HE  ve
 
 HA  83      20  20                  DE      NS    
 
 ZI  01      11  11                              
 
 NE  0                               PH      DO    
 
                                    AR      N     
 
 25                                  MA      R     
 
                                    CY            
 
 MG                                            
 
                           OF            
 
 TA                                   
 
 BL                      CY      
 
 ET                      NT      
 
                  HI      
 
                AN      
 
                A      
 
                     
 
                  
 
                  
 
                  
 
                  
 
                  
 
               
 
               
 
              
 
     00      01  02  6   30  30      EA  20  CO  Ac
 
     00      -1  -2      .0          ST  86  MM  ti
 
     60      8-  4-      00          SI  10  UN  ve
 
     11      20  20                  DE      IT    
 
     73      11  11                         Y     
 
     1                               PH      AL    
 
                                     AR      LE    
 
                                     MA      RG    
 
                                     CY      Y     
 
                                          &     
 
                            OF      AS    
 
                                   TH 
 
                         CY   MA 
 
                         NT     
 
                  HI   PS 
 
                AN   C  
 
                A       
 
                        
 
                  
 
                  
 
                  
 
                  
 
                  
 
                  
 
                 
 
              
 
     00      01  02  6   30  30      EA  20  MA  Ac
 
     00      -1  -2      .0          ST  86  SH  ti
 
     60      8-  4-      00          SI  10  BU  ve
 
     11      20  20                  DE      RN    
 
     73      11  11                              
 
     1                               PH      AM    
 
                                     AR      Y     
 
                                     MA      B     
 
                                     CY            
 
                                                
 
                            OF            
 
                                      
 
                         CY      
 
                         NT      
 
                  HI      
 
                AN      
 
                A      
 
                     
 
               
 
               
 
               
 
               
 
               
 
               
 
               
 
              
 
 LO  45      01  02  6   30  30      EA  20  MA  Ac
 
 RA  80      -1  -2      .0          ST  86  SH  ti
 
 TA  20      8-  4-      00          SI  08  BU  ve
 
 DI  65      20  20                  DE      RN    
 
 NE  08      11  11                              
 
    7                               PH      AM    
 
 10                                  AR      Y     
 
                                    MA      B     
 
 MG                                  CY            
 
                                               
 
 TA                         OF            
 
 BL                                   
 
 ET                      CY      
 
                         NT      
 
                  HI      
 
                AN      
 
                A      
 
                     
 
                  
 
                  
 
                  
 
                  
 
               
 
               
 
               
 
              
 
 AS  00      02  02  6   0.  15      EA  21  MA  Ac
 
 MA  08      -1  -1      24          ST  29  SH  ti
 
 NE  51      7-  7-      0           SI  45  BU  ve
 
 X   34      20  20                  DE      RN    
 
 TW  10      11  11                              
 
 IS  2                               PH      AM    
 
 TH                                  AR      Y     
 
 AL                                  MA      B     
 
 ER                                  CY            
 
                                               
 
 22                         OF            
 
 0                                   
 
 MC                      CY      
 
 G                       NT      
 
 #6               HI      
 
 0              AN      
 
                A      
 
                     
 
                  
 
                  
 
                  
 
                  
 
                  
 
                  
 
                 
 
              
 
 MA  51      02  02  0   59  1       EA  21  ST  Ac
 
 LA  67      -1  -1      .0          ST  26  EP  ti
 
 TH  25      6  6      00          SI  49  HE  ve
 
 IO  27      20  20                  DE      NS    
 
 N   70      11  11                              
 
 0.  4                               PH      DO    
 
 5%                                  AR      N     
 
                                    MA      R     
 
 LO                                  CY            
 
 TI                                            
 
 ON                         OF            
 
                                      
 
                         CY      
 
                         NT      
 
                  HI      
 
                AN      
 
                A      
 
                     
 
                  
 
                  
 
               
 
               
 
               
 
               
 
               
 
              
 
 CO  37      01  01  6   30  30      EA  20  MA  Ac
 
 IL  00      -1  -1      .0          ST  86  SH  ti
 
 OS  00      8  8      00          SI  05  BU  ve
 
 EC  45      20  20                  DE      RN    
 
    50      11  11                              
 
 OT  2                               PH      AM    
 
 C                                   AR      Y     
 
 20                                  MA      B     
 
 .6                                  CY            
 
                                               
 
 MG                         OF            
 
                                     
 
 TA                      CY      
 
 BL                      NT      
 
 ET               HI      
 
                AN      
 
                A      
 
                     
 
                  
 
                  
 
                  
 
                  
 
                  
 
                  
 
               
 
              
 
 [place]  00      01  01  3   30  4       EA  20  MA  Ac
 
 ST  16      -1  -1      .0          ST  86  SH  ti
 
 AT  80      8-  8-      00          SI  06  BU  ve
 
 IN  00      20  20                  DE      RN    
 
    73      11  11                              
 
 10  0                               PH      AM    
 
 0,                                  AR      Y     
 
 00                                  MA      B     
 
 0                                   CY            
 
 UN                                            
 
 IT                         OF            
 
 S/                                   
 
 GM                      CY      
 
                        NT      
 
 OI               HI      
 
 NT             AN      
 
                A      
 
                     
 
                  
 
                  
 
                  
 
                  
 
                  
 
                  
 
                  
 
              
 
 NA  00      01  01  6   17  30      EA  20  MA  Ac
 
 SO  08      -1  -1      .0          ST  86  SH  ti
 
 NE  51      8-  8-      00          SI  07  BU  ve
 
 X   28      20  20                  DE      RN    
 
 50  80      11  11                              
 
    1                               PH      AM    
 
 MC                                  AR      Y     
 
 G                                   MA      B     
 
 NA                                  CY            
 
 SA                                             
 
 L                          OF            
 
 SP                                   
 
 RA                      CY      
 
 Y                       NT      
 
                  HI      
 
                AN      
 
                A      
 
                     
 
                  
 
                  
 
                  
 
                  
 
                 
 
               
 
               
 
              
 
 LO  45      01  01  6   30  30      EA  20  MA  Ac
 
 RA  80      -1  -1      .0          ST  86  SH  ti
 
 TA  20      8-  8-      00          SI  08  BU  ve
 
 DI  65      20  20                  DE      RN    
 
 NE  08      11  11                              
 
    7                               PH      AM    
 
 10                                  AR      Y     
 
                                    MA      B     
 
 MG                                  CY            
 
                                               
 
 TA                         OF            
 
 BL                                   
 
 ET                      CY      
 
                         NT      
 
                  HI      
 
                AN      
 
                A      
 
                     
 
                  
 
                  
 
                  
 
                  
 
               
 
               
 
               
 
              
 
 VE  00      01  01  3   18  18      EA  20  MA  Ac
 
 NT  17      -1  -1      .0          ST  86  SH  ti
 
 OL  30      8-  8-      00          SI  09  BU  ve
 
 IN  68      20  20                  DE      RN    
 
    22      11  11                              
 
 HF  0                               PH      AM    
 
 A                                   AR      Y     
 
 90                                  MA      B     
 
                                    CY            
 
 MC                                             
 
 G                          OF            
 
 IN                                   
 
 HA                      CY      
 
 LE                      NT      
 
 R                HI      
 
                AN      
 
                A      
 
                     
 
                  
 
                  
 
                  
 
                  
 
                  
 
                 
 
               
 
              
 
     00      01  01  6   30  30      EA  20  CO  Ac
 
     00      -1  -1      .0          ST  86  MM  ti
 
     60      8-  8      00          SI  10  UN  ve
 
     11      20  20                  DE      IT    
 
     73      11  11                         Y     
 
     1                               PH      AL    
 
                                     AR      LE    
 
                                     MA      RG    
 
                                     CY      Y     
 
                                          &     
 
                            OF      AS    
 
                                   TH 
 
                         CY   MA 
 
                         NT     
 
                  HI   PS 
 
                AN   C  
 
                A       
 
                        
 
                  
 
                  
 
                  
 
                  
 
                  
 
                  
 
                 
 
              
 
     00      01  01  6   30  30      EA  20  MA  Ac
 
     00      -1  -1      .0          ST  86  SH  ti
 
     60      8-  8      00          SI  10  BU  ve
 
     11      20  20                  DE      RN    
 
     73      11  11                              
 
     1                               PH      AM    
 
                                     AR      Y     
 
                                     MA      B     
 
                                     CY            
 
                                                
 
                            OF            
 
                                      
 
                         CY      
 
                         NT      
 
                  HI      
 
                AN      
 
                A      
 
                     
 
               
 
               
 
               
 
               
 
               
 
               
 
               
 
              
 
 LO  45      11  11  0   30  30      EA  19  ST  Ac
 
 RA  80      -0  -0      .0          ST  89  EP  ti
 
 TA  20      9-  9-      00          SI  81  HE  ve
 
 DI  65      20  20                  DE      NS    
 
 NE  08      10  10                              
 
    7                               PH      DO    
 
 10                                  AR      N     
 
                                    MA      R     
 
 MG                                  CY            
 
                                               
 
 TA                         OF            
 
 BL                                   
 
 ET                      CY      
 
                         NT      
 
                  HI      
 
                AN      
 
                A      
 
                     
 
                  
 
                  
 
                  
 
                  
 
               
 
               
 
               
 
              
 
 AZ  00      11  11  0   6.  6       EA  19  ST  Ac
 
 IT  09      -0  -0      00          ST  89  EP  ti
 
 HR  37      9-  9-      0           SI  82  HE  ve
 
 OM  14      20  20                  DE      NS    
 
 YC  61      10  10                              
 
 IN  8                               PH      DO    
 
                                    AR      N     
 
 25                                  MA      R     
 
 0                                   CY            
 
 MG                                            
 
                           OF            
 
 TA                                  
 
 BL                      CY      
 
 ET                      NT      
 
                  HI      
 
                AN      
 
                A      
 
                     
 
                  
 
                  
 
                  
 
                  
 
                  
 
               
 
               
 
              
 
 [place]  53      09  09  0   14  7       EA  19  ST  Ac
 
 LF  74      -2  -2      .0          ST  29  EP  ti
 
 AM  60      5-  5-      00          SI  06  HE  ve
 
 ET  27      20  20                  DE      NS    
 
 HO  20      10  10                              
 
 XA  5                               PH      DO    
 
 ZO                                  AR      N     
 
 LE                                  MA      R     
 
 -T                                  CY            
 
 MP                                            
 
                           OF            
 
 DS                                   
 
                        CY      
 
 TA                      NT      
 
 BL               HI      
 
 ET             AN      
 
                A      
 
                     
 
                  
 
                  
 
                  
 
                  
 
                  
 
                  
 
                  
 
              
 
     00      09  09  0   6.  3       EA  19  RU  Ac
 
     59      -2  -2      00          ST  25  SH  ti
 
     10      2-  2-      0           SI  63     ve
 
     50      20  20                  DE      NE    
 
     20      10  10                         IL    
 
     1                               PH       C    
 
                                     AR            
 
                                     MA            
 
                                     CY            
 
                                               
 
                            OF            
 
                                     
 
                         CY      
 
                         NT      
 
                  HI      
 
                AN   
 
                A    
 
                     
 
               
 
               
 
               
 
               
 
               
 
               
 
               
 
              
 
 [place]  00      08  08  0   6.  6       EA  18  ST  Ac
 
 IT  09      -2  -2      00          ST  78  EP  ti
 
 HR  37      0-  0-      0           SI  86  HE  ve
 
 OM  14      20  20                  DE      NS    
 
 YC  61      10  10                              
 
 IN  8                               PH      DO    
 
                                    AR      N     
 
 25                                  MA      R     
 
 0                                   CY            
 
 MG                                            
 
                           OF            
 
 TA                                  
 
 BL                      CY      
 
 ET                      NT      
 
                  HI      
 
                AN      
 
                A      
 
                     
 
                  
 
                  
 
                  
 
                  
 
                  
 
               
 
               
 
              
 
 SE  45      03  07  3   12  15      EA  16  ST  Ac
 
 LE  80      -2  -2      0.          ST  91  EP  ti
 
 NI  20      4-  6-      00          SI  94  HE  ve
 
 UM  04      20  20      0           DE      NS    
 
    06      10  10                              
 
 [place]  4                               PH      DO    
 
 LF                                  AR      N     
 
 ID                                  MA      R     
 
 E                                   CY            
 
 2.                                             
 
 5%                         OF            
 
                                     
 
 LO                        CY      
 
 TI                      NT      
 
 ON               HI      
 
                AN      
 
                A      
 
                     
 
                  
 
                  
 
                  
 
                  
 
                  
 
                  
 
               
 
              
 
 NA  00      03  03  0   17  30      EA  16  MA  Ac
 
 SO  08      -2  -2      .0          ST  96  SH  ti
 
 NE  51      7-  7-      00          SI  78  BU  ve
 
 X   28      20  20                  DE      RN    
 
 50  80      10  10                              
 
    1                               PH      AM    
 
 MC                                  AR      Y     
 
 G                                   MA      B     
 
 NA                                  CY            
 
 SA                                             
 
 L                          OF            
 
 SP                                   
 
 RA                      CY      
 
 Y                       NT      
 
                  HI      
 
                AN      
 
                A      
 
                     
 
                  
 
                  
 
                  
 
                  
 
                 
 
               
 
               
 
              
 
 LO  45      03  03  0   30  30      EA  16  MA  Ac
 
 RA  80      -2  -2      .0          ST  96  SH  ti
 
 TA  20      7-  7-      00          SI  79  BU  ve
 
 DI  65      20  20                  DE      RN    
 
 NE  08      10  10                              
 
    7                               PH      AM    
 
 10                                  AR      Y     
 
                                    MA      B     
 
 MG                                  CY            
 
                                               
 
 TA                         OF            
 
 BL                                   
 
 ET                      CY      
 
                         NT      
 
                  HI      
 
                AN      
 
                A      
 
                     
 
                  
 
                  
 
                  
 
                  
 
               
 
               
 
               
 
              
 
     00      03  03  0   30  30      EA  16  CO  Ac
 
     00      -2  -2      .0          ST  96  MM  ti
 
     60      7-  7      00          SI  80  UN  ve
 
     11      20  20                  DE      IT    
 
     73      10  10                         Y     
 
     1                               PH      AL    
 
                                     AR      LE    
 
                                     MA      RG    
 
                                     CY      Y     
 
                                          &     
 
                            OF      AS    
 
                                   TH 
 
                         CY   MA 
 
                         NT     
 
                  HI   PS 
 
                AN   C  
 
                A       
 
                        
 
                  
 
                  
 
                  
 
                  
 
                  
 
                  
 
                 
 
              
 
     00      03  03  0   30  30      EA  16  MA  Ac
 
     00      -2  -2      .0          ST  96  SH  ti
 
     60      7-  7-      00          SI  80  BU  ve
 
     11      20  20                  DE      RN    
 
     73      10  10                              
 
     1                               PH      AM    
 
                                     AR      Y     
 
                                     MA      B     
 
                                     CY            
 
                                                
 
                            OF            
 
                                      
 
                         CY      
 
                         NT      
 
                  HI      
 
                AN      
 
                A      
 
                     
 
               
 
               
 
               
 
               
 
               
 
               
 
               
 
              
 
 ME  00      03  03  0   21  6       EA  16  ST  Ac
 
 TH  78      -2  -2      .0          ST  91  EP  ti
 
 YL  15      4-  4-      00          SI  92  HE  ve
 
 CO  02      20  20                  DE      NS    
 
 ED  20      10  10                              
 
 NI  7                               PH      DO    
 
 SO                                  AR      N     
 
 LO                                  MA      R     
 
 NE                                  CY            
 
  4                                            
 
                           OF            
 
 MG                                   
 
                        CY      
 
 DO                      NT      
 
 SE               HI      
 
 PK             AN      
 
                A      
 
                     
 
                  
 
                  
 
                  
 
                  
 
                  
 
                  
 
                  
 
              
 
 AM  00      03  03  0   30  10      EA  16  ST  Ac
 
 OX  78      -2  -2      .0          ST  91  EP  ti
 
 IC  12      4-  4-      00          SI  93  HE  ve
 
 IL  02      20  20                  DE      NS    
 
 LI  00      10  10                              
 
 N   5                               PH      DO    
 
 25                                  AR      N     
 
 0                                   MA      R     
 
 MG                                  CY            
 
                                               
 
 CA                         OF            
 
 PS                                   
 
 UL                      CY      
 
 E                       NT      
 
                  HI      
 
                AN      
 
                A      
 
                     
 
                  
 
                  
 
                  
 
                  
 
                 
 
               
 
               
 
              
 
 SE  45      03  03  3   12  15      EA  16  ST  Ac
 
 LE  80      -2  -2      0.          ST  91  EP  ti
 
 NI  20      4-  4-      00          SI  94  HE  ve
 
 UM  04      20  20      0           DE      NS    
 
    06      10  10                              
 
 [place]  4                               PH      DO    
 
 LF                                  AR      N     
 
 ID                                  MA      R     
 
 E                                   CY            
 
 2.                                             
 
 5%                         OF            
 
                                     
 
 LO                        CY      
 
 TI                      NT      
 
 ON               HI      
 
                AN      
 
                A      
 
                     
 
                  
 
                  
 
                  
 
                  
 
                  
 
                  
 
               
 
              
 
 CO  68      02  02  00  12  2       WA  70  ST  Ac
 
 OM  38      -1  -2      .0          L-  58  EP  ti
 
 ET  20      4-  6-      00          MA  50  HE  ve
 
 HA  04      20  20                  RT  9   NS    
 
 ZI  10      10  10                              
 
 NE  1                               PH      DO    
 
                                    AR      N     
 
 25                                  MA      R     
 
                                    CY            
 
 MG                                             
 
                           #5            
 
 TA                         91         
 
 BL                                
 
 ET                              
 
                          
 
                        
 
                       
 
                     
 
                  
 
                  
 
                  
 
               
 
               
 
 AM  00      02  02  00  21  7       EA  16  ST  Ac
 
 OX  78      -1  -2      .0          ST  38  EP  ti
 
 IC  12      6-  6-      00          SI  63  HE  ve
 
 IL  61      20  20                  DE      NS    
 
 LI  30      10  10                              
 
 N   5                               PH      DO    
 
 50                                  AR      N     
 
 0                                   MA      R     
 
 MG                                  CY            
 
                                               
 
 CA                         OF            
 
 PS                         CY         
 
 UL                      NT      
 
 E                       HI      
 
                  AN      
 
                A       
 
                       
 
                     
 
                  
 
                  
 
                  
 
                  
 
                 
 
               
 
              
 
 LO  45      02  02  00  14  14      EA  16  ST  Ac
 
 RA  80      -1  -2      .0          ST  38  EP  ti
 
 TA  20      6-  6-      00          SI  64  HE  ve
 
 DI  65      20  20                  DE      NS    
 
 NE  08      10  10                              
 
    7                               PH      DO    
 
 10                                  AR      N     
 
                                    MA      R     
 
 MG                                  CY            
 
                                                
 
 TA                         OF            
 
 BL                         CY         
 
 ET                      NT      
 
                         HI      
 
                  AN      
 
                A       
 
                       
 
                     
 
                  
 
                  
 
                  
 
                  
 
               
 
               
 
              
 
     00      01  12  04  30  30      EA  11  CO  Ac
 
     00      -1  -0      .0          ST  08  MM  ti
 
     60      3-  3-      00          SI  14  UN  ve
 
     11      20  20                  DE      IT    
 
     73      09  09                         Y     
 
     1                               PH      AL    
 
                                     AR      LE    
 
                                     MA      RG    
 
                                     CY      Y     
 
                                           &     
 
                            OF      AS    
 
                            CY      TH 
 
                         NT   MA 
 
                         HI     
 
                  AN   PS 
 
                A    C  
 
                        
 
                        
 
                  
 
                  
 
                  
 
                  
 
                  
 
                  
 
                
 
 LO  45      01  12  03  30  30      EA  11  CO  Ac
 
 RA  80      -1  -0      .0          ST  08  MM  ti
 
 TA  20      3-  3-      00          SI  13  UN  ve
 
 DI  65      20  20                  DE      IT    
 
 NE  08      09  09                         Y     
 
    7                               PH      AL    
 
 10                                  AR      LE    
 
                                    MA      RG    
 
 MG                                  CY      Y     
 
                                          &     
 
 TA                         OF      AS    
 
 BL                         CY      TH 
 
 ET                      NT   MA 
 
                         HI     
 
                  AN   PS 
 
                A    C  
 
                        
 
                        
 
                     
 
                     
 
                     
 
                     
 
                  
 
                  
 
                
 
 NA  00      01  12  01  17  30      EA  11  CO  Ac
 
 SO  08      -1  -0      .0          ST  08  MM  ti
 
 NE  51      3-  3-      00          SI  11  UN  ve
 
 X   28      20  20                  DE      IT    
 
 50  80      09  09                         Y     
 
    1                               PH      AL    
 
 MC                                  AR      LE    
 
 G                                   MA      RG    
 
 NA                                  CY      Y     
 
 SA                                        &     
 
 L                          OF      AS    
 
 SP                         CY      TH 
 
 RA                      NT   MA 
 
 Y                       HI     
 
                  AN   PS 
 
                A    C  
 
                        
 
                        
 
                     
 
                     
 
                     
 
                     
 
                    
 
                  
 
                
 
 LO  60      01  11  02  30  30      EA  11  CO  Ac
 
 RA  50      -1  -1      .0          ST  08  MM  ti
 
 TA  50      3-  9-      00          SI  13  UN  ve
 
 DI  14      20  20                  DE      IT    
 
 NE  70      09  09                         Y     
 
    8                               PH      AL    
 
 10                                  AR      LE    
 
                                    MA      RG    
 
 MG                                  CY      Y     
 
                                          &     
 
 TA                         OF      AS    
 
 BL                         CY      TH 
 
 ET                      NT   MA 
 
                         HI     
 
                  AN   PS 
 
                A    C  
 
                        
 
                        
 
                     
 
                     
 
                     
 
                     
 
                  
 
                  
 
                
 
 AZ  00      09  09  00  6.  5       EA  14  ST  Ac
 
 IT  09      -1  -2      00          ST  28  EP  ti
 
 HR  37      6-  4-      0           SI  03  HE  ve
 
 OM  14      20  20                  DE      NS    
 
 YC  61      09  09                              
 
 IN  8                               PH      DO    
 
                                    AR      N     
 
 25                                  MA      R     
 
 0                                   CY            
 
 MG                                             
 
                           OF            
 
 TA                        CY         
 
 BL                      NT      
 
 ET                      HI      
 
                  AN      
 
                A       
 
                       
 
                     
 
                  
 
                  
 
                  
 
                  
 
                  
 
               
 
              
 
 VE  00      07  08  00  18  18      EA  13  CO  Ac
 
 NT  17      -3  -1      .0          ST  68  MM  ti
 
 OL  30      1-  3-      00          SI  23  UN  ve
 
 IN  68      20  20                  DE      IT    
 
    22      09  09                         Y     
 
 HF  0                               PH      AL    
 
 A                                   AR      LE    
 
 90                                  MA      RG    
 
                                    CY      Y     
 
 MC                                        &     
 
 G                          OF      AS    
 
 IN                         CY      TH 
 
 FRANCIS                      NT   MA 
 
 LE                      HI     
 
 R                AN   PS 
 
                A    C  
 
                        
 
                        
 
                     
 
                     
 
                     
 
                     
 
                     
 
                    
 
                
 
 CO  37      01  06  04  30  30      EA  11  CO  Ac
 
 IL  00      -1  -1      .0          ST  08  MM  ti
 
 OS  00      3  8-      00          SI  12  UN  ve
 
 EC  45      20  20                  DE      IT    
 
    50      09  09                         Y     
 
 OT  2                               PH      AL    
 
 C                                   AR      LE    
 
 20                                  MA      RG    
 
 .6                                  CY      Y     
 
                                          &     
 
 MG                         OF      AS    
 
                           CY      TH 
 
 TA                      NT   MA 
 
 BL                      HI     
 
 ET               AN   PS 
 
                A    C  
 
                        
 
                        
 
                     
 
                     
 
                     
 
                     
 
                     
 
                     
 
                
 
 LO  60      01  06  01  30  30      EA  11  CO  Ac
 
 RA  50      -1  -1      .0          ST  08  MM  ti
 
 TA  50      3-  8-      00          SI  13  UN  ve
 
 DI  14      20  20                  DE      IT    
 
 NE  70      09  09                         Y     
 
    8                               PH      AL    
 
 10                                  AR      LE    
 
                                    MA      RG    
 
 MG                                  CY      Y     
 
                                          &     
 
 TA                         OF      AS    
 
 BL                         CY      TH 
 
 ET                      NT   MA 
 
                         HI     
 
                  AN   PS 
 
                A    C  
 
                        
 
                        
 
                     
 
                     
 
                     
 
                     
 
                  
 
                  
 
                
 
     00      01  06  03  30  30      EA  11  CO  Ac
 
     00      -1  -1      .0          ST  08  MM  ti
 
     60      3-  8-      00          SI  14  UN  ve
 
     11      20  20                  DE      IT    
 
     73      09  09                         Y     
 
     1                               PH      AL    
 
                                     AR      LE    
 
                                     MA      RG    
 
                                     CY      Y     
 
                                           &     
 
                            OF      AS    
 
                            CY      TH 
 
                         NT   MA 
 
                         HI     
 
                  AN   PS 
 
                A    C  
 
                        
 
                        
 
                  
 
                  
 
                  
 
                  
 
                  
 
                  
 
                
 
     00      01  05  02  30  30      EA  11  CO  Ac
 
     00      -1  -0      .0          ST  08  MM  ti
 
     60      3-  7-      00          SI  14  UN  ve
 
     11      20  20                  DE      IT    
 
     73      09  09                         Y     
 
     1                               PH      AL    
 
                                     AR      LE    
 
                                     MA      RG    
 
                                     CY      Y     
 
                                           &     
 
                            OF      AS    
 
                            CY      TH 
 
                         NT   MA 
 
                         HI     
 
                  AN   PS 
 
                A    C  
 
                        
 
                        
 
                  
 
                  
 
                  
 
                  
 
                  
 
                  
 
                
 
 LO  45      01  05  00  30  30      EA  11  CO  Ac
 
 RA  80      -1  -0      .0          ST  08  MM  ti
 
 TA  20      3-  7-      00          SI  13  UN  ve
 
 DI  65      20  20                  DE      IT    
 
 NE  08      09  09                         Y     
 
    7                               PH      AL    
 
 10                                  AR      LE    
 
                                    MA      RG    
 
 MG                                  CY      Y     
 
                                          &     
 
 TA                         OF      AS    
 
 BL                         CY      TH 
 
 ET                      NT   MA 
 
                         HI     
 
                  AN   PS 
 
                A    C  
 
                        
 
                        
 
                     
 
                     
 
                     
 
                     
 
                  
 
                  
 
                
 
 CO  37      01  05  03  30  30      EA  11  CO  Ac
 
 IL  00      -1  -0      .0          ST  08  MM  ti
 
 OS  00      3-  7-      00          SI  12  UN  ve
 
 EC  45      20  20                  DE      IT    
 
    50      09  09                         Y     
 
 OT  2                               PH      AL    
 
 C                                   AR      LE    
 
 20                                  MA      RG    
 
 .6                                  CY      Y     
 
                                          &     
 
 MG                         OF      AS    
 
                           CY      TH 
 
 TA                      NT   MA 
 
 BL                      HI     
 
 ET               AN   PS 
 
                A    C  
 
                        
 
                        
 
                     
 
                     
 
                     
 
                     
 
                     
 
                     
 
                
 
 AZ  00      03  04  00  6.  5       EA  12  ST  Ac
 
 IT  09      -2  -0      00          ST  01  EP  ti
 
 HR  37      3-  9-      0           SI  45  HE  ve
 
 OM  14      20  20                  DE      NS    
 
 YC  61      09  09                              
 
 IN  8                               PH      DO    
 
                                    AR      N     
 
 25                                  MA      R     
 
 0                                   CY            
 
 MG                                             
 
                           OF            
 
 TA                        CY         
 
 BL                      NT      
 
 ET                      HI      
 
                  AN      
 
                A       
 
                       
 
                     
 
                  
 
                  
 
                  
 
                  
 
                  
 
               
 
              
 
     60      03  04  00  12  6       EA  12  ST  Ac
 
     25      -2  -0      0.          ST  01  EP  ti
 
     80      3-  9-      00          SI  46  HE  ve
 
     23      20  20      0           DE      NS    
 
     91      09  09                              
 
     6                               PH      DO    
 
                                     AR      N     
 
                                     MA      R     
 
                                     CY            
 
                                                
 
                            OF            
 
                            CY         
 
                           NT      
 
                         HI      
 
                  AN      
 
                A       
 
                       
 
                     
 
               
 
               
 
               
 
               
 
               
 
               
 
              
 
     00      01  03  01  30  30      EA  11  CO  Ac
 
     00      -1  -2      .0          ST  08  MM  ti
 
     60      3-  6-      00          SI  14  UN  ve
 
     11      20  20                  DE      IT    
 
     73      09  09                         Y     
 
     1                               PH      AL    
 
                                     AR      LE    
 
                                     MA      RG    
 
                                     CY      Y     
 
                                           &     
 
                            OF      AS    
 
                            CY      TH 
 
                         NT   MA 
 
                         HI     
 
                  AN   PS 
 
                A    C  
 
                        
 
                        
 
                  
 
                  
 
                  
 
                  
 
                  
 
                  
 
                
 
 CO  37      01  03  02  30  30      EA  11  CO  Ac
 
 IL  00      -1  -2      .0          ST  08  MM  ti
 
 OS  00      3-  6-      00          SI  12  UN  ve
 
 EC  45      20  20                  DE      IT    
 
    50      09  09                         Y     
 
 OT  2                               PH      AL    
 
 C                                   AR      LE    
 
 20                                  MA      RG    
 
 .6                                  CY      Y     
 
                                          &     
 
 MG                         OF      AS    
 
                           CY      TH 
 
 TA                      NT   MA 
 
 BL                      HI     
 
 ET               AN   PS 
 
                A    C  
 
                        
 
                        
 
                     
 
                     
 
                     
 
                     
 
                     
 
                     
 
                
 
     00      01  02  00  30  30      EA  11  CO  Ac
 
     00      -1  -2      .0          ST  08  MM  ti
 
     60      3-  6-      00          SI  14  UN  ve
 
     11      20  20                  DE      IT    
 
     73      09  09                         Y     
 
     1                               PH      AL    
 
                                     AR      LE    
 
                                     MA      RG    
 
                                     CY      Y     
 
                                           &     
 
                            OF      AS    
 
                            CY      TH 
 
                         NT   MA 
 
                         HI     
 
                  AN   PS 
 
                A    C  
 
                        
 
                        
 
                  
 
                  
 
                  
 
                  
 
                  
 
                  
 
                
 
 CO  37      01  02  01  30  30      EA  11  CO  Ac
 
 IL  00      -1  -2      .0          ST  08  MM  ti
 
 OS  00      3-  6-      00          SI  12  UN  ve
 
 EC  45      20  20                  DE      IT    
 
    50      09  09                         Y     
 
 OT  2                               PH      AL    
 
 C                                   AR      LE    
 
 20                                  MA      RG    
 
 .6                                  CY      Y     
 
                                          &     
 
 MG                         OF      AS    
 
                           CY      TH 
 
 TA                      NT   MA 
 
 BL                      HI     
 
 ET               AN   PS 
 
                A    C  
 
                        
 
                        
 
                     
 
                     
 
                     
 
                     
 
                     
 
                     
 
                
 
 AZ  00      01  01  00  6.  5       EA  11  ST  Ac
 
 IT  09      -2  -3      00          ST  18  EP  ti
 
 HR  37      1-  0-      0           SI  16  HE  ve
 
 OM  14      20  20                  DE      NS    
 
 YC  61      09  09                              
 
 IN  8                               PH      DO    
 
                                    AR      N     
 
 25                                  MA      R     
 
 0                                   CY            
 
 MG                                             
 
                           OF            
 
 TA                        CY         
 
 BL                      NT      
 
 ET                      HI      
 
                  AN      
 
                A       
 
                       
 
                     
 
                  
 
                  
 
                  
 
                  
 
                  
 
               
 
              
 
 NA  00      01  01  00  17  30      EA  11  CO  Ac
 
 SO  08      -1  -3      .0          ST  08  MM  ti
 
 NE  51      3-  0-      00          SI  11  UN  ve
 
 X   28      20  20                  DE      IT    
 
 50  80      09  09                         Y     
 
    1                               PH      AL    
 
 MC                                  AR      LE    
 
 G                                   MA      RG    
 
 NA                                  CY      Y     
 
 SA                                        &     
 
 L                          OF      AS    
 
 SP                         CY      TH 
 
 RA                      NT   MA 
 
 Y                       HI     
 
                  AN   PS 
 
                A    C  
 
                        
 
                        
 
                     
 
                     
 
                     
 
                     
 
                    
 
                  
 
                
 
 CO  37      01  01  00  30  30      EA  11  CO  Ac
 
 IL  00      -1  -3      .0          ST  08  MM  ti
 
 OS  00      3-  0-      00          SI  12  UN  ve
 
 EC  45      20  20                  DE      IT    
 
    50      09  09                         Y     
 
 OT  2                               PH      AL    
 
 C                                   AR      LE    
 
 20                                  MA      RG    
 
 .6                                  CY      Y     
 
                                          &     
 
 MG                         OF      AS    
 
                           CY      TH 
 
 TA                      NT   MA 
 
 BL                      HI     
 
 ET               AN   PS 
 
                A    C  
 
                        
 
                        
 
                     
 
                     
 
                     
 
                     
 
                     
 
                     
 
                
 
     49      07  01  05  60  30      EA  98  CO  Ac
 
     88      -0  -0      .0          ST  63  MM  ti
 
     40      7-  1-      00          SI  67  UN  ve
 
     54      20  20                  DE      IT    
 
     41      08  09                         Y     
 
     0                               PH      AL    
 
                                     AR      LE    
 
                                     MA      RG    
 
                                     CY      Y     
 
                                           &     
 
                            OF      AS    
 
                            CY      TH 
 
                         NT   MA 
 
                         HI     
 
                  AN   PS 
 
                A    C  
 
                        
 
                        
 
                  
 
                  
 
                  
 
                  
 
                  
 
                  
 
                
 
 LO  60      10  01  02  30  30      EA  99  CO  Ac
 
 RA  50      -2  -0      .0          ST  94  MM  ti
 
 TA  50      1-  1-      00          SI  12  UN  ve
 
 DI  14      20  20                  DE      IT    
 
 NE  70      08  09                         Y     
 
    1                               PH      AL    
 
 10                                  AR      LE    
 
                                    MA      RG    
 
 MG                                  CY      Y     
 
                                          &     
 
 TA                         OF      AS    
 
 BL                         CY      TH 
 
 ET                      NT   MA 
 
                         HI     
 
                  AN   PS 
 
                A    C  
 
                        
 
                        
 
                     
 
                     
 
                     
 
                     
 
                  
 
                  
 
                
 
     00      07  01  05  30  30      EA  98  CO  Ac
 
     00      -0  -0      .0          ST  63  MM  ti
 
     60      7-  1-      00          SI  68  UN  ve
 
     11      20  20                  DE      IT    
 
     73      08  09                         Y     
 
     1                               PH      AL    
 
                                     AR      LE    
 
                                     MA      RG    
 
                                     CY      Y     
 
                                           &     
 
                            OF      AS    
 
                            CY      TH 
 
                         NT   MA 
 
                         HI     
 
                  AN   PS 
 
                A    C  
 
                        
 
                        
 
                  
 
                  
 
                  
 
                  
 
                  
 
                  
 
                
 
 CO  60      12  12  00  12  3       EA  10  ST  Ac
 
 OM  43      -0  -1      0.          ST  57  EP  ti
 
 ET  20      8-  8-      00          SI  97  HE  ve
 
 HA  60      20  20      0           DE      NS    
 
 ZI  80      08  08                              
 
 NE  4                               PH      DO    
 
                                    AR      N     
 
 6.                                  MA      R     
 
 25                                  CY            
 
                                               
 
 MG                         OF            
 
 /5                         CY         
 
                          NT      
 
 ML                      HI      
 
                 AN      
 
 SY             A       
 
 RP                    
 
                     
 
                  
 
                  
 
                  
 
                  
 
                  
 
                  
 
                 
 
               
 
     49      07  12  04  60  30      EA  98  CO  Ac
 
     88      -0  -0      .0          ST  63  MM  ti
 
     40      7-  4-      00          SI  67  UN  ve
 
     54      20  20                  DE      IT    
 
     40      08  08                         Y     
 
     2                               PH      AL    
 
                                     AR      LE    
 
                                     MA      RG    
 
                                     CY      Y     
 
                                           &     
 
                            OF      AS    
 
                            CY      TH 
 
                         NT   MA 
 
                         HI     
 
                  AN   PS 
 
                A    C  
 
                        
 
                        
 
                  
 
                  
 
                  
 
                  
 
                  
 
                  
 
                
 
     00      07  12  04  30  30      EA  98  CO  Ac
 
     00      -0  -0      .0          ST  63  MM  ti
 
     60      7-  4-      00          SI  68  UN  ve
 
     11      20  20                  DE      IT    
 
     73      08  08                         Y     
 
     1                               PH      AL    
 
                                     AR      LE    
 
                                     MA      RG    
 
                                     CY      Y     
 
                                           &     
 
                            OF      AS    
 
                            CY      TH 
 
                         NT   MA 
 
                         HI     
 
                  AN   PS 
 
                A    C  
 
                        
 
                        
 
                  
 
                  
 
                  
 
                  
 
                  
 
                  
 
                
 
 LO  60      10  12  01  30  30      EA  99  CO  Ac
 
 RA  50      -2  -0      .0          ST  94  MM  ti
 
 TA  50      1-  4-      00          SI  12  UN  ve
 
 DI  14      20  20                  DE      IT    
 
 NE  70      08  08                         Y     
 
    1                               PH      AL    
 
 10                                  AR      LE    
 
                                    MA      RG    
 
 MG                                  CY      Y     
 
                                          &     
 
 TA                         OF      AS    
 
 BL                         CY      TH 
 
 ET                      NT   MA 
 
                         HI     
 
                  AN   PS 
 
                A    C  
 
                        
 
                        
 
                     
 
                     
 
                     
 
                     
 
                  
 
                  
 
                
 
 LO  60      10  11  00  30  30      EA  99  CO  Ac
 
 RA  50      -2  -0      .0          ST  94  MM  ti
 
 TA  50      1-  7-      00          SI  12  UN  ve
 
 DI  14      20  20                  DE      IT    
 
 NE  70      08  08                         Y     
 
    1                               PH      AL    
 
 10                                  AR      LE    
 
                                    MA      RG    
 
 MG                                  CY      Y     
 
                                          &     
 
 TA                         OF      AS    
 
 BL                         CY      TH 
 
 ET                      NT   MA 
 
                         HI     
 
                  AN   PS 
 
                A    C  
 
                        
 
                        
 
                     
 
                     
 
                     
 
                     
 
                  
 
                  
 
                
 
 RA  00      07  11  03  60  30      EA  98  CO  Ac
 
 NI  17      -0  -0      .0          ST  63  MM  ti
 
 TI  24      7-  7      00          SI  67  UN  ve
 
 DI  35      20  20                  DE      IT    
 
 NE  77      08  08                         Y     
 
    0                               PH      AL    
 
 15                                  AR      LE    
 
 0                                   MA      RG    
 
 MG                                  CY      Y     
 
                                          &     
 
 TA                         OF      AS    
 
 BL                         CY      TH 
 
 ET                      NT   MA 
 
                         HI     
 
                  AN   PS 
 
                A    C  
 
                        
 
                        
 
                     
 
                     
 
                     
 
                     
 
                  
 
                  
 
                
 
     00      07  11  03  30  30      EA  98  CO  Ac
 
     00      -0  -0      .0          ST  63  MM  ti
 
     60      7-  7-      00          SI  68  UN  ve
 
     11      20  20                  DE      IT    
 
     73      08  08                         Y     
 
     1                               PH      AL    
 
                                     AR      LE    
 
                                     MA      RG    
 
                                     CY      Y     
 
                                           &     
 
                            OF      AS    
 
                            CY      TH 
 
                         NT   MA 
 
                         HI     
 
                  AN   PS 
 
                A    C  
 
                        
 
                        
 
                  
 
                  
 
                  
 
                  
 
                  
 
                  
 
                
 
 AM  00      10  10  00  21  7       EA  99  No  Ac
 
 OX  78      -0  -2      .0          ST  73  t   ti
 
 IC  12      4-  3-      00          SI  66  Av  ve
 
 IL  02      20  20                  DE      ai    
 
 LI  00      08  08                         la    
 
 N   5                               PH      bl    
 
 25                                  AR      e     
 
 0                                   MA            
 
 MG                                  CY            
 
                                               
 
 CA                         OF            
 
 PS                         CY         
 
 UL                      NT      
 
 E                       HI      
 
                  AN      
 
                A      
 
                     
 
                     
 
                  
 
                  
 
                  
 
                  
 
                 
 
               
 
              
 
     00      07  10  02  30  30      EA  98  No  Ac
 
     00      -0  -0      .0          ST  63  t   ti
 
     60      7-  9-      00          SI  68  Av  ve
 
     11      20  20                  DE      ai    
 
     73      08  08                         la    
 
     1                               PH      bl    
 
                                     AR      e     
 
                                     MA            
 
                                     CY            
 
                                                 
 
                            OF            
 
                            CY         
 
                         NT      
 
                         HI      
 
                  AN      
 
                A      
 
                     
 
                     
 
               
 
               
 
               
 
               
 
               
 
               
 
              
 
 LO  60      09  10  00  30  30      EA  99  No  Ac
 
 RA  50      -2  -0      .0          ST  55  t   ti
 
 TA  50      2-  9-      00          SI  80  Av  ve
 
 DI  14      20  20                  DE      ai    
 
 NE  70      08  08                         la    
 
    1                               PH      bl    
 
 10                                  AR      e     
 
                                    MA            
 
 MG                                  CY            
 
                                                
 
 TA                         OF            
 
 BL                         CY         
 
 ET                      NT      
 
                         HI      
 
                  AN      
 
                A      
 
                     
 
                     
 
                  
 
                  
 
                  
 
                  
 
               
 
               
 
              
 
     49      07  10  02  60  30      EA  98  No  Ac
 
     88      -0  -0      .0          ST  63  t   ti
 
     40      7-  9-      00          SI  67  Av  ve
 
     54      20  20                  DE      ai    
 
     40      08  08                         la    
 
     2                               PH      bl    
 
                                     AR      e     
 
                                     MA            
 
                                     CY            
 
                                                 
 
                            OF            
 
                            CY         
 
                         NT      
 
                         HI      
 
                  AN      
 
                A      
 
                     
 
                     
 
               
 
               
 
               
 
               
 
               
 
               
 
              
 
     49      07  08  01  60  30      EA  98  No  Ac
 
     88      -0  -2      .0          ST  63  t   ti
 
     40      7-  8-      00          SI  67  Av  ve
 
     54      20  20                  DE      ai    
 
     40      08  08                         la    
 
     2                               PH      bl    
 
                                     AR      e     
 
                                     MA            
 
                                     CY            
 
                                                 
 
                            OF            
 
                            CY         
 
                         NT      
 
                         HI      
 
                  AN      
 
                A      
 
                     
 
                     
 
               
 
               
 
               
 
               
 
               
 
               
 
              
 
 LO  60      01  08  06  30  30      EA  96  No  Ac
 
 RA  50      -2  -2      .0          ST  44  t   ti
 
 TA  50      1-  8-      00          SI  65  Av  ve
 
 DI  14      20  20                  DE      ai    
 
 NE  70      08  08                         la    
 
    1                               PH      bl    
 
 10                                  AR      e     
 
                                    MA            
 
 MG                                  CY            
 
                                                
 
 TA                         OF            
 
 BL                         CY         
 
 ET                      NT      
 
                         HI      
 
                  AN      
 
                A      
 
                     
 
                     
 
                  
 
                  
 
                  
 
                  
 
               
 
               
 
              
 
     00      07  08  01  30  30      EA  98  No  Ac
 
     00      -0  -2      .0          ST  63  t   ti
 
     60      7-  8-      00          SI  68  Av  ve
 
     11      20  20                  DE      ai    
 
     73      08  08                         la    
 
     1                               PH      bl    
 
                                     AR      e     
 
                                     MA            
 
                                     CY            
 
                                                 
 
                            OF            
 
                            CY         
 
                         NT      
 
                         HI      
 
                  AN      
 
                A      
 
                     
 
                     
 
               
 
               
 
               
 
               
 
               
 
               
 
              
 
 LO  60      01  08  05  30  30      EA  96  No  Ac
 
 RA  50      -2  -0      .0          ST  44  t   ti
 
 TA  50      1-  1-      00          SI  65  Av  ve
 
 DI  14      20  20                  DE      ai    
 
 NE  70      08  08                         la    
 
    1                               PH      bl    
 
 10                                  AR      e     
 
                                    MA            
 
 MG                                  CY            
 
                                                
 
 TA                         OF            
 
 BL                         CY         
 
 ET                      NT      
 
                         HI      
 
                  AN      
 
                A      
 
                     
 
                     
 
                  
 
                  
 
                  
 
                  
 
               
 
               
 
              
 
 SE  45      01  08  01  12  4       EA  96  No  Ac
 
 LE  80      -0  -0      0.          ST  28  t   ti
 
 NI  20      9-  1-      00          SI  45  Av  ve
 
 UM  04      20  20      0           DE      ai    
 
    06      08  08                         la    
 
 [place]  4                               PH      bl    
 
 LF                                  AR      e     
 
 ID                                  MA            
 
 E                                   CY            
 
 2.                                             
 
 5%                         OF            
 
                           CY         
 
 LO                        NT      
 
 TI                      HI      
 
 ON               AN      
 
                A      
 
                     
 
                     
 
                  
 
                  
 
                  
 
                  
 
                  
 
                  
 
              
 
 VE  00      07  07  00  18  18      EA  98  No  Ac
 
 NT  17      -0  -1      .0          ST  63  t   ti
 
 OL  30      7-  7-      00          SI  69  Av  ve
 
 IN  68      20  20                  DE      ai    
 
    22      08  08                         la    
 
 HF  0                               PH      bl    
 
 A                                   AR      e     
 
 90                                  MA            
 
                                    CY            
 
 MC                                              
 
 G                          OF            
 
 IN                         CY         
 
 [place]                      NT      
 
 LE                      HI      
 
 R                AN      
 
                A      
 
                     
 
                     
 
                  
 
                  
 
                  
 
                  
 
                  
 
                 
 
              
 
 NA  00      07  07  00  17  17      EA  98  No  Ac
 
 SO  08      -0  -1      .0          ST  63  t   ti
 
 NE  51      7-  7-      00          SI  66  Av  ve
 
 X   28      20  20                  DE      ai    
 
 50  80      08  08                         la    
 
    1                               PH      bl    
 
 MC                                  AR      e     
 
 G                                   MA            
 
 NA                                  CY            
 
 SA                                              
 
 L                          OF            
 
 SP                         CY         
 
 RA                      NT      
 
 Y                       HI      
 
                  AN      
 
                A      
 
                     
 
                     
 
                  
 
                  
 
                  
 
                  
 
                 
 
               
 
              
 
     00      07  07  00  30  30      EA  98  No  Ac
 
     00      -0  -1      .0          ST  63  t   ti
 
     60      7-  7      00          SI  68  Av  ve
 
     11      20  20                  DE      ai    
 
     73      08  08                         la    
 
     1                               PH      bl    
 
                                     AR      e     
 
                                     MA            
 
                                     CY            
 
                                                 
 
                            OF            
 
                            CY         
 
                         NT      
 
                         HI      
 
                  AN      
 
                A      
 
                     
 
                     
 
               
 
               
 
               
 
               
 
               
 
               
 
              
 
     49      07  07  00  60  30      EA  98  No  Ac
 
     88      -0  -1      .0          ST  63  t   ti
 
     40      7-  7-      00          SI  67  Av  ve
 
     54      20  20                  DE      ai    
 
     40      08  08                         la    
 
     2                               PH      bl    
 
                                     AR      e     
 
                                     MA            
 
                                     CY            
 
                                                 
 
                            OF            
 
                            CY         
 
                         NT      
 
                         HI      
 
                  AN      
 
                A      
 
                     
 
                     
 
               
 
               
 
               
 
               
 
               
 
               
 
              
 
 NA  00      01  07  02  17  17      EA  96  No  Ac
 
 SO  08      -2  -0      .0          ST  44  t   ti
 
 NE  51      1-  3-      00          SI  61  Av  ve
 
 X   28      20  20                  DE      ai    
 
 50  80      08  08                         la    
 
    1                               PH      bl    
 
 MC                                  AR      e     
 
 G                                   MA            
 
 NA                                  CY            
 
 SA                                              
 
 L                          OF            
 
 SP                         CY         
 
 RA                      NT      
 
 Y                       HI      
 
                  AN      
 
                A      
 
                     
 
                     
 
                  
 
                  
 
                  
 
                  
 
                 
 
               
 
              
 
 LO  60      01  06  04  30  30      EA  96  No  Ac
 
 RA  50      -2  -1      .0          ST  44  t   ti
 
 TA  50      1-  2-      00          SI  65  Av  ve
 
 DI  14      20  20                  DE      ai    
 
 NE  70      08  08                         la    
 
    1                               PH      bl    
 
 10                                  AR      e     
 
                                    MA            
 
 MG                                  CY            
 
                                                
 
 TA                         OF            
 
 BL                         CY         
 
 ET                      NT      
 
                         HI      
 
                  AN      
 
                A      
 
                     
 
                     
 
                  
 
                  
 
                  
 
                  
 
               
 
               
 
              
 
     00      01  06  04  30  30      EA  96  No  Ac
 
     00      -2  -1      .0          ST  44  t   ti
 
     60      1-  2-      00          SI  63  Av  ve
 
     11      20  20                  DE      ai    
 
     73      08  08                         la    
 
     1                               PH      bl    
 
                                     AR      e     
 
                                     MA            
 
                                     CY            
 
                                                 
 
                            OF            
 
                            CY         
 
                         NT      
 
                         HI      
 
                  AN      
 
                A      
 
                     
 
                     
 
               
 
               
 
               
 
               
 
               
 
               
 
              
 
     49      01  06  04  60  30      EA  96  No  Ac
 
     88      -2  -1      .0          ST  44  t   ti
 
     40      1-  2-      00          SI  64  Av  ve
 
     54      20  20                  DE      ai    
 
     40      08  08                         la    
 
     2                               PH      bl    
 
                                     AR      e     
 
                                     MA            
 
                                     CY            
 
                                                 
 
                            OF            
 
                            CY         
 
                         NT      
 
                         HI      
 
                  AN      
 
                A      
 
                     
 
                     
 
               
 
               
 
               
 
               
 
               
 
               
 
              
 
     49      01  05  03  60  30      EA  96  No  Ac
 
     88      -2  -2      .0          ST  44  t   ti
 
     40      1-  2-      00          SI  64  Av  ve
 
     54      20  20                  DE      ai    
 
     40      08  08                         la    
 
     2                               PH      bl    
 
                                     AR      e     
 
                                     MA            
 
                                     CY            
 
                                                 
 
                            OF            
 
                            CY         
 
                         NT      
 
                         HI      
 
                  AN      
 
                A      
 
                     
 
                     
 
               
 
               
 
               
 
               
 
               
 
               
 
              
 
     00      01  05  03  30  30      EA  96  No  Ac
 
     00      -2  -2      .0          ST  44  t   ti
 
     60      1-  2-      00          SI  63  Av  ve
 
     11      20  20                  DE      ai    
 
     73      08  08                         la    
 
     1                               PH      bl    
 
                                     AR      e     
 
                                     MA            
 
                                     CY            
 
                                                 
 
                            OF            
 
                            CY         
 
                         NT      
 
                         HI      
 
                  AN      
 
                A      
 
                     
 
                     
 
               
 
               
 
               
 
               
 
               
 
               
 
              
 
 LO  60      01  05  03  30  30      EA  96  No  Ac
 
 RA  50      -2  -2      .0          ST  44  t   ti
 
 TA  50      1-  2-      00          SI  65  Av  ve
 
 DI  14      20  20                  DE      ai    
 
 NE  70      08  08                         la    
 
    1                               PH      bl    
 
 10                                  AR      e     
 
                                    MA            
 
 MG                                  CY            
 
                                                
 
 TA                         OF            
 
 BL                         CY         
 
 ET                      NT      
 
                         HI      
 
                  AN      
 
                A      
 
                     
 
                     
 
                  
 
                  
 
                  
 
                  
 
               
 
               
 
              
 
 LO  60      01  04  02  30  30      EA  96  No  Ac
 
 RA  50      -2  -1      .0          ST  44  t   ti
 
 TA  50      1-  0-      00          SI  65  Av  ve
 
 DI  14      20  20                  DE      ai    
 
 NE  70      08  08                         la    
 
    1                               PH      bl    
 
 10                                  AR      e     
 
                                    MA            
 
 MG                                  CY            
 
                                                
 
 TA                         OF            
 
 BL                         CY         
 
 ET                      NT      
 
                         HI      
 
                  AN      
 
                A      
 
                     
 
                     
 
                  
 
                  
 
                  
 
                  
 
               
 
               
 
              
 
     49      01  04  02  60  30      EA  96  No  Ac
 
     88      -2  -1      .0          ST  44  t   ti
 
     40      1-  0-      00          SI  64  Av  ve
 
     54      20  20                  DE      ai    
 
     40      08  08                         la    
 
     2                               PH      bl    
 
                                     AR      e     
 
                                     MA            
 
                                     CY            
 
                                                 
 
                            OF            
 
                            CY         
 
                         NT      
 
                         HI      
 
                  AN      
 
                A      
 
                     
 
                     
 
               
 
               
 
               
 
               
 
               
 
               
 
              
 
     00      01  04  02  30  30      EA  96  No  Ac
 
     00      -2  -1      .0          ST  44  t   ti
 
     60      1-  0      00          SI  63  Av  ve
 
     11      20  20                  DE      ai    
 
     73      08  08                         la    
 
     1                               PH      bl    
 
                                     AR      e     
 
                                     MA            
 
                                     CY            
 
                                                 
 
                            OF            
 
                            CY         
 
                         NT      
 
                         HI      
 
                  AN      
 
                A      
 
                     
 
                     
 
               
 
               
 
               
 
               
 
               
 
               
 
              
 
     00      01  04  01  30  30      EA  96  No  Ac
 
     00      -2  -0      .0          ST  44  t   ti
 
     60      1  7      00          SI  63  Av  ve
 
     11      20  20                  DE      ai    
 
     73      08  08                         la    
 
     1                               PH      bl    
 
                                     AR      e     
 
                                     MA            
 
                                     CY            
 
                                                 
 
                            OF            
 
                            CY         
 
                         NT      
 
                         HI      
 
                  AN      
 
                A      
 
                     
 
                     
 
               
 
               
 
               
 
               
 
               
 
               
 
              
 
     49      01  04  01  60  30      EA  96  No  Ac
 
     88      -2  -0      .0          ST  44  t   ti
 
     40        7          SI  64  Av  ve
 
     54      20  20                  DE      ai    
 
     40      08  08                         la    
 
     2                               PH      bl    
 
                                     AR      e     
 
                                     MA            
 
                                     CY            
 
                                                 
 
                            OF            
 
                            CY         
 
                         NT      
 
                         HI      
 
                  AN      
 
                A      
 
                     
 
                     
 
               
 
               
 
               
 
               
 
               
 
               
 
              
 
 LO  60      01  04  01  30  30      EA  96  No  Ac
 
 RA  50      -2  -0      .0          ST  44  t   ti
 
 TA  50      1-  7-      00          SI  65  Av  ve
 
 DI  14      20  20                  DE      ai    
 
 NE  70      08  08                         la    
 
    1                               PH      bl    
 
 10                                  AR      e     
 
                                    MA            
 
 MG                                  CY            
 
                                                
 
 TA                         OF            
 
 BL                         CY         
 
 ET                      NT      
 
                         HI      
 
                  AN      
 
                A      
 
                     
 
                     
 
                  
 
                  
 
                  
 
                  
 
               
 
               
 
              
 
 NA  00      01  04  01  17  17      EA  96  No  Ac
 
 SO  08      -2  -0      .0          ST  44  t   ti
 
 NE  51        7      00          SI  61  Av  ve
 
 X   28      20  20                  DE      ai    
 
 50  80      08  08                         la    
 
    1                               PH      bl    
 
 MC                                  AR      e     
 
 G                                   MA            
 
 NA                                  CY            
 
 SA                                              
 
 L                          OF            
 
 SP                         CY         
 
 RA                      NT      
 
 Y                       HI      
 
                  AN      
 
                A      
 
                     
 
                     
 
                  
 
                  
 
                  
 
                  
 
                 
 
               
 
              
 
     00      01  03  00  30  30      EA  96  No  Ac
 
     00      -2  -2      .0          ST  44  t   ti
 
     60      1  6          SI  63  Av  ve
 
     11      20  20                  DE      ai    
 
     73      08  08                         la    
 
     1                               PH      bl    
 
                                     AR      e     
 
                                     MA            
 
                                     CY            
 
                                                 
 
                            OF            
 
                            CY         
 
                         NT      
 
                         HI      
 
                  AN      
 
                A      
 
                     
 
                     
 
               
 
               
 
               
 
               
 
               
 
               
 
              
 
     49      01  03  00  60  30      EA  96  No  Ac
 
     88      -2  -2      .0          ST  44  t   ti
 
     40        6          SI  64  Av  ve
 
     54      20  20                  DE      ai    
 
     40      08  08                         la    
 
     2                               PH      bl    
 
                                     AR      e     
 
                                     MA            
 
                                     CY            
 
                                                 
 
                            OF            
 
                            CY         
 
                         NT      
 
                         HI      
 
                  AN      
 
                A      
 
                     
 
                     
 
               
 
               
 
               
 
               
 
               
 
               
 
              
 
 AM  00      01  03  00  30  10      EA  96  No  Ac
 
 OX  78      -3  -2      .0          ST  58  t   ti
 
 IC  12      1  6          SI  70  Av  ve
 
 IL  61      20  20                  DE      ai    
 
 LI  30      08  08                         la    
 
 N   5                               PH      bl    
 
 50                                  AR      e     
 
 0                                   MA            
 
 MG                                  CY            
 
                                                
 
 CA                         OF            
 
 PS                         CY         
 
 UL                      NT      
 
 E                       HI      
 
                  AN      
 
                A      
 
                     
 
                     
 
                  
 
                  
 
                  
 
                  
 
                 
 
               
 
              
 
 LO  60      01  03  00  30  30      EA  96  No  Ac
 
 RA  50      -2  -2      .0          ST  44  t   ti
 
 TA  50      1-  6-      00          SI  65  Av  ve
 
 DI  14      20  20                  DE      ai    
 
 NE  70      08  08                         la    
 
    1                               PH      bl    
 
 10                                  AR      e     
 
                                    MA            
 
 MG                                  CY            
 
                                                
 
 TA                         OF            
 
 BL                         CY         
 
 ET                      NT      
 
                         HI      
 
                  AN      
 
                A      
 
                     
 
                     
 
                  
 
                  
 
                  
 
                  
 
               
 
               
 
              
 
 NA  00      01  03  00  17  17      EA  96  No  Ac
 
 SO  08      -2  -2      .0          ST  44  t   ti
 
 NE  51      1-  5-      00          SI  61  Av  ve
 
 X   28      20  20                  DE      ai    
 
 50  80      08  08                         la    
 
    1                               PH      bl    
 
 MC                                  AR      e     
 
 G                                   MA            
 
 NA                                  CY            
 
 SA                                              
 
 L                          OF            
 
 SP                         CY         
 
 RA                      NT      
 
 Y                       HI      
 
                  AN      
 
                A      
 
                     
 
                     
 
                  
 
                  
 
                  
 
                  
 
                 
 
               
 
              
 
 VE  00      01  03  00  18  18      EA  96  No  Ac
 
 NT  17      -2  -2      .0          ST  44  t   ti
 
 OL  30      1          SI  62  Av  ve
 
 IN  68      20  20                  DE      ai    
 
    22      08  08                         la    
 
 HF  0                               PH      bl    
 
 A                                   AR      e     
 
 90                                  MA            
 
                                    CY            
 
 MC                                              
 
 G                          OF            
 
 IN                         CY         
 
 FRANCIS                      NT      
 
 LE                      HI      
 
 R                AN      
 
                A      
 
                     
 
                     
 
                  
 
                  
 
                  
 
                  
 
                  
 
                 
 
              
 
 SE  45      01  03  00  12  4       EA  96  No  Ac
 
 LE  80      -0  -2      0.          ST  28  t   ti
 
 NI  20      9-  4-      00          SI  45  Av  ve
 
 UM  04      20  20      0           DE      ai    
 
    06      08  08                         la    
 
 [place]  4                               PH      bl    
 
 LF                                  AR      e     
 
 ID                                  MA            
 
 E                                   CY            
 
 2.                                             
 
 5%                         OF            
 
                           CY         
 
 LO                        NT      
 
 TI                      HI      
 
 ON               AN      
 
                A      
 
                     
 
                     
 
                  
 
                  
 
                  
 
                  
 
                  
 
                  
 
              
 
                                                                                
                                                                                
                                                                                
                                                                                
                                                                                
                                                                                
                                                                                
                                                                                
                                                                                
                                                                                
                                                                                
                                                                                
                                                                                
                                                                                
                                                                                
                                                                         
 
Encounters
                      
 
 
 Encounter  Start   End Date   Code       Location   Performer
 
  Type      Date                                                 
 
                                                        
 
                
 
 [facility]                [facility]            
 
 -   7          7                     [facility]                [facility]            
 
 -   7          7                     [facility]                [facility]            
 
 -   7          7                     [facility]                [facility]            
 
 -   7          7                     [facility]                [facility]            
 
 -   7          7                     [facility]                [facility]            
 
 -   5          5                     [facility]                [facility]            
 
 -   5          5                     [facility]                [facility]            
 
 -   4          4                     [facility]                [facility]            
 
 -   2          2                     [facility]                [facility]            
 
 -   2          2                     [facility]                [facility]            
 
 -   1          1                     [facility]                [facility]EL[facility]            
 
 -   9          9                     [facility]                CHANTELLE            
 
 -   8          8                     [facility]

## 2017-11-19 NOTE — [facility] REPORT
History of Present Issue
Date/Time Seen by Provider 11/19/17 5737
Visit Reason
Pt arrived:Walked    
Presenting Problem:PT C/O SORE THROAT X1 WEEK 
Location if Accident:
Onset of symptoms date/time:/ or onset unknown for:MEDICAL HX UNKNOWN
 
Have you (or family members/close friends) recently traveled outside the [place]? N
If Yes, where/when: 
Have you had exposure to infectious disease within the past month?  TB? 
Other?  Specify: 
 
Patient state that she has had sore throat for over a week States that she was 
seen at [facility] on Tuesday and given Amoxicillin however her throat is 
still sore and she wanted to come in and make sure that it has not turned into 
strep throat 
ALLERGIES
Coded Allergies:
No Known Allergies (06/21/16)
 
Home Medications
Active Scripts
ALBUTEROL (Proventil Hfa Inhaler) 1-2 PUFF IH Q4-6H PRN 
     #1 CAN Ref 1
     Prov:      05/11/17
Azithromycin (Zithromycin (Z-BOUCHRA) 250MG Tab******) 250 MG PO DAILY 
     #4 TAB 
     Prov:      08/03/17
Benzonatate (Tessalon Perle) 100 MG PO TID 
     #15 SGL 
     Prov:      08/03/17
NAPROXEN (NAPROSYN 500MG TAB*****) 500 MG PO BID 
     #28 TAB 
     Prov:      07/17/17
 
Reported Medications
D-METHORPHAN HB/P-EPD HCL/BPM (Fubctmmvsp-Cjhdyruwanc-Aq Syr) 473 ML PO BID 
     #400 
 
 
 
History
 
Medical History
General
   CAD? No
   Angina: No
   MI: No
   Hypertension? No
   Hyperlipidemia? No
   CHF? No
   DVT? No
   PE? No
   COPD? No
   Asthma? No
   Anemia? No
   GERD? No
   Gastric ulcers? No
   GI Bleed? No
   Hernia? No
   Thyroid Problems? No
   Hypothyroidism? No
   CVA? No
   Seizures? No
   Diabetes? No
   Renal Insuffiency? No
   UTI? No
   Stones? No
   BPH? No
   GB Disease: No
   Nephritic Syndrome? No
   Asplenia? No
   Hepatitis? No
   Sickle Cell Disease? No
   Arthritis? No
   Migraines? No
   Cataracts? No
   Glaucoma? No
   MRSA? No
   HIV? No
   TB? No
   Anxiety? No
   Depression? No
   Cancer? No
   More? Yes
   Additional hx:
denies possibility of pregnancy
Immunization HX
   DT/Tetanus UNKNOWN
Surgical Hx
Previous Surgery?Y  EAR TUBES   T&A      
              
            
 
 
Social History
Smoking Hx
   Smoker: Never Smoker
   Tobacco: No
Alcohol
   Alcohol: No
 
Review of Systems
All Other Systems Reviewed and Negative
ENT
throat pain. 
Respiratory
cough
 
Physical Exam
Vital Signs
 Vital Signs
 
 
Date Time Temp Pulse Resp B/P Pulse O2 O2 Flow FiO2
 
     Ox Delivery Rate 
 
11/19 1618 98.2 106 20 131/96 99   
 
 
General Appearance normal appearance, WD/WN, no apparent distress
Ear, Nose, Throat Throat red, irriated drainage noted
Respiratory Status
Yes: trachea midline, chest symmetrical, non tender chest.  No: respiratory 
distress. 
Cardiovascular normal exam, regular rate/rhythm, no peripheral edema
Neurologic alert, normal exam, oriented x 3
 
Medical Decision Making
 
LABS/Meds/Orders
Pt receiving controlled substance in ED? No
Results/Orders
 Laboratory Tests
 
 
11/19/17 1615:
Group A Strep Screen NOT DETECTED
 Orders
 
 
Procedure Date/time Status
 
[facility] STREP SCREEN 11/19 1615 Complete
 
 
 
Departure
 
Departure
Time of Disposition 1645
Disposition DC Home or Self Care(routine)
Clinical Impression
Primary Impression: Pharyngitis
Qualifiers:  Pharyngitis/tonsillitis etiology: unspecified etiology Qualified 
Code: J02.9 - Acute pharyngitis, unspecified
Condition STABLE
Referrals
[facility] (Family): 3 Days-Call Office
if no improvement
 
Patient Instructions Sore Throat
Additional Instructions
* Monitor Temp. Tylenol and/or Ibuprofen as needed. ER if fever is no less than 
101 despite alternating Tylenol and Ibuprofen
* Encourage fluids, water, Gatorade, powerade, pedialyte if infant/toddler/or 
child
* Warm salt water gargles for throat irritation
*Warm fluids 
*Sore throat lozenges
*Sleep elevated
*humidifier or vaporizer
Warm tea with honey will help to soothe the throat
Lots of rest
Increase fluids, water, Gatorade, powerade
*Your throat swab was sent to lab for culture. Those results area typically sent
to your primary care physician. Be sure to follow up in 2-3 days if no 
improvement so they can review those results and treat if necessary If you dont
have primary care I recommend you get one, but in the mean time you will have to
return to a walk in clinic
** Follow up IMMEDIATELY for new or worsening of symptoms OR no noticeable 
improvement over the next 48-72 hours. 911 immediately for any life threatening 
symptoms such as chest pain or difficulty breathing
 
Continue taking antibiotics 
Follow up with family doctor if symptoms continue to worsen
REturn if needed
Discharge Counseling
   Counseled pt/family regarding diagnosis, test results, home care, follow up 
needs
 
Electronically Signed by LIYAH BENJAMIN on 11/19/17 at 6759

## 2017-11-19 NOTE — EXTERNAL MEDICAL SUMMARY RPT
[facility]
 Created on: 2017
 
TROY JAMMIE
External Reference #: [id number]
: 89
Sex: Female
 
Demographics
 
 
 
 Address  [address]  [address]
 
 Home Phone  [phone]
 
 Preferred Language  English
 
 Marital Status  Unknown
 
 [employer] Affiliation  Unknown
 
 Race  Unknown
 
 Ethnic Group  Unknown
 
 
Author
 
 
 
 Author            ,            [facility]
 
 Organization  [facility]
 
 Address  Unknown
 
 Phone  [email or web address]
 
 
 
Care Team Providers
 
 
 
 Care Team Member Name  Role  Phone
 
 YAHIR CARDOZA, YAHIR CARDOZA   Unavailable  Unavailable
 
 LAZARO EMILY, LAZARO   Unavailable  Unavailable
 
 EMILY   
 
 UMESH MILIND,   Unavailable  Unavailable
 
 UMESH MILIND   
 
 MARK VISION,   Unavailable  Unavailable
 
 MARK VISION   
 
 [facility]   Unavailable  Unavailable
 
 [place]   
 
 [facility] [place]  
 
    
 
 [facility]   Unavailable  Unavailable
 
 [place]  
 
  [facility]  
 
    
 
 ENEDELIA HANNAH,   Unavailable  Unavailable
 
 ENEDELIA HANNAH   
 
 [facility]   Unavailable  Unavailable
 
 [place], [facility]   Unavailable  Unavailable
 
 CENTER, [facility]   Unavailable  Unavailable
 
 INC, [facility]   
 
 [facility]   Unavailable  Unavailable
 
 HOSPITAL, [facility],   Unavailable  Unavailable
 
 [facility]   
 
 [facility],  Unavailable  Unavailable
 
  [facility]  
 
    
 
 [facility]   Unavailable  Unavailable
 
 [facility], [facility]   
 
 DYLLAN SOM,   Unavailable  Unavailable
 
 DYLLAN SOM   
 
 DYLLAN, SOM B,   Unavailable  Unavailable
 
 DYLLAN, SOM B   
 
 [facility] RON[facility], [facility]   Unavailable  Unavailable
 
 [facility],   Unavailable  Unavailable
 
 [facility]   Unavailable  Unavailable
 
 [facility]   
 
 [facility]   
 
 DILLAN MEAD,   Unavailable  Unavailable
 
 [facility],   Unavailable  Unavailable
 
 [facility]   
 
 [facility]   Unavailable  Unavailable
 
 [address]   
 
 [facility] [address]   
 
 [facility]   Unavailable  Unavailable
 
 ANG[facility], [facility]   
 
 [facility] ARNAV WANG III MESERET,   Unavailable  Unavailable
 
 KATHLEEN III MESERET   
 
                                            
 
Purpose
                      Continuity of Care Document - 2008 through 2017                                                                            
                     
 
Problems
                      
 
 
 Code         Diagnosis    DOS          Provider     Status     
 
                                                               
 
               
 
 J00          ACUTE   2017   [facility]              
 
              NASOPHARYNG               PHYSICIAN              
 
      ITIS COMMON          GROUP                   
 
   COLD                       
 
                  
 
      
 
         CARPAL   2017   [facility]              
 
              TUNNEL                PHYSICIANS              
 
      SYNDROME           GROUP                   
 
  BILATERAL                  
 
  UPPER LIMBS     
 
                
 
            
 
         LESION OF   2017   [facility]              
 
              ULNAR NERVE               PHYSICIANS              
 
       BILATERAL           GROUP                   
 
  UPPER LIMBS                 
 
                  
 
            
 
         ENCOUNTER   2017   [facility]              
 
              FOR                PHYSICIANS              
 
      SURVEILLANC          GROUP                   
 
  E                  
 
  CONTRACEPTI     
 
  VES UNS       
 
                
 
        
 
 J209         ACUTE   2017   [facility]              
 
              BRONCHITIS                PHYSICIANS,             
 
      UNSPECIFIED           PLL                   
 
                              
 
              
 
 R05          COUGH        2017   [facility]              
 
                                        PHYSICIANS,             
 
                  [facility]                   
 
                  
 
      
 
 R509         FEVER   2017   [place]              
 
              UNSPECIFIED               MEDICAL              
 
                           IMAGING ASS             
 
                          
 
            
 
 J028         ACUTE   2017   [facility]              
 
              PHARYNGITIS               PHYSICIANS              
 
       DUE TO           GROUP                   
 
  OTHER SPEC                  
 
  ORGANISMS       
 
                
 
          
 
 Z111         ENCOUNTER   2017   [facility]              
 
              SCREENING                PHYSICIAN              
 
      FOR           GROUP                   
 
  RESPIRATORY                 
 
        
 
  TUBERCULOSI   
 
  S             
 
             
 
 J029         ACUTE   2017   [facility]              
 
              PH[facility]NGITIS               [facility]              
 
                 INC                     
 
  UNSPECIFIED                
 
                
 
            
 
 J020         STREPTOCOCC  2017   [facility]              
 
              AL                [facility]              
 
      PHARYNGITIS          INC                     
 
                             
 
            
 
 J069         ACUTE UPPER  2016   [facility]              
 
                              PHYSICIANS              
 
      RESPIRATORY          GROUP                   
 
   INFECTION                  
 
  UNSPECIFIED     
 
                
 
            
 
 [id number]       CELLULITIS   2016   [facility]              
 
              OF RIGHT                PHYSICIANS,             
 
      LOWER LIMB            PLLC                   
 
                              
 
             
 
         ENCOUNTER   2016   [facility]              
 
              SURVEILLANC               PHYSICIANS              
 
      E           GROUP                   
 
  INJECTABLE                  
 
  CONTRACEPTI     
 
  VE            
 
              
 
 [id number]       ACUTE   2016   [facility]              
 
              SUPPURATIVE               PHYSICIANS              
 
       OM W/O           GROUP                   
 
  RUPT EAR                  
 
  DRUM UNS      
 
  EAR           
 
               
 
 R102         PELVIC AND   2016   [facility]              
 
              PERINEAL                PHYSICIANS              
 
      PAIN                 GROUP                   
 
                              
 
      
 
         LEFT LOWER   2016   [facility]              
 
              QUADRANT                PHYSICIANS              
 
      PAIN                 GROUP                   
 
                              
 
      
 
         ENCOUNTER   2015   [facility]              
 
              FOR                PHYSICIANS              
 
      SURVEILLANC          GROUP                   
 
  E OTHER                  
 
  CONTRACEPTI     
 
  VES           
 
               
 
         HIGH RISK   2015   [facility]              
 
              HETEROSEXUA               [facility]              
 
      L BEHAVIOR           INC                     
 
                             
 
           
 
 7804         DIZZINESS   2015   [facility]              
 
              AND                PHYSICIANS              
 
      GIDDINESS            GROUP                   
 
                              
 
            
 
 7850         UNSPECIFIED  2015   [facility]              
 
                              PHYSICIANS              
 
      TACHYCARDIA          GROUP                   
 
                              
 
              
 
 [id number]        CHEST PAIN   2015   [facility]              
 
              UNSPECIFIED               PHYSICIANS              
 
                           GROUP                   
 
                          
 
      
 
 96229        ACUTE   2015   [facility]              
 
              SANGUINOUS                MEMORIAL              
 
      OTITIS           [facility]                
 
  MEDIA                       
 
                     
 
      
 
 5990         URINARY   2015   [facility]              
 
              TRACT                PHYSICIANS              
 
      INFECTION           GROUP                   
 
  SITE NOT                  
 
  SPECIFIED       
 
                
 
          
 
 6268         OTH D/O   2015   [facility]              
 
              MENSTRUATIO               PHYSICIANS              
 
      N&OTH ABN           GROUP                   
 
  BLEED FE                  
 
  GNT TRACT       
 
                
 
          
 
 [id number]        INFLUENZA   2015   [facility]              
 
              IDENT [facility]                
 
  A RESP                  
 
  MANIFEST           
 
                
 
         
 
 4660         ACUTE   2015   [facility]              
 
              BRONCHITIS                PHYSICIANS              
 
                           GROUP                   
 
                         
 
      
 
 37838        ABDOMINAL   2014   [facility]              
 
              PAIN RIGHT                PHYSICIANS              
 
      LOWER           GROUP                   
 
  QUADRANT                    
 
                  
 
         
 
 V741         SCREENING   10-   [facility]              
 
              EXAMINATION               PHYSICIANS              
 
       FOR           GROUP                   
 
  PULMONARY                  
 
  TUBERCULOSI     
 
  S             
 
             
 
 99590        SHORTNESS   2014   [facility]              
 
              OF BREATH                 PHYSICIANS              
 
                           GROUP                   
 
                        
 
      
 
 5368         DYSPEPSIA&O  2014   [facility]              
 
              THER SPEC                [facility]              
 
    DISORDERS           [facility]                 
 
  FUNCTION                  
 
  STOMACH           
 
                
 
        
 
 23946        NAUSEA   2014   [facility]              
 
              ALONE                     [facility]              
 
                         [place]                 
 
                    
 
        
 
 81458        NAUSEA WITH  2014   [facility]              
 
               VOMITING                 [facility]              
 
                         [place]                 
 
                        
 
        
 
 51684        PAIN IN   2014   [facility]              
 
              JOINT, SITE               TH DEPT              
 
               [place]                 
 
  UNSPECIFIED                 
 
                    
 
            
 
 6253         DYSMENORRHE  2014   [facility]              
 
              A                         [facility]              
 
                      [place]                 
 
                  
 
        
 
 7840         HEADACHE     2014   [facility]              
 
                                        TH DEPT              
 
                  [place]                 
 
                  
 
        
 
 81167        FEVER   2014   [facility]              
 
              UNSPECIFIED               [facility]              
 
                         [place]                 
 
                          
 
        
 
 462          ACUTE   2014   [facility]              
 
              PHARYNGITIS               PHYSICIANS              
 
                           GROUP                   
 
                          
 
      
 
 03629        FEVER   2014   [facility]              
 
              PRESENTING                PHYSICIANS              
 
      CONDITIONS           GROUP                   
 
  CLASSIFIED                  
 
  ELSEWHERE       
 
                
 
          
 
         SURVEILLANC  2013   [facility]             
 
              E OT PREV                 HEALTH              
 
    PRSC           CENTER                  
 
  CONTRACEPT                  
 
  METHOD           
 
                
 
       
 
         OTHER   2013   [facility]             
 
              [facility]                 HEALTH              
 
    PROCREATIVE          CENTER                  
 
   MANAGEMENT                 
 
                   
 
            
 
 69536        DIARRHEA     2013   [facility]               
 
                                                                
 
                                    
 
      
 
 V700         ROUTINE   2013   [facility]              
 
              GENERAL                [facility]                     
 
    MEDICAL                                  
 
  EXAM@[facility]     
 
   CARE FACL    
 
                
 
           
 
 1110         PITYRIASIS   2013   [facility]               
 
              VERSICOLOR                                        
 
                                           
 
             
 
 V255         INSERTION   2012   [facility]              
 
              OF                                        
 
    IMPLANTABLE                             
 
   SUBDERMAL      
 
  CONTRACEPTI   
 
  VE            
 
              
 
 2662         OTHER   2012   [facility]             
 
              [facility]                 HEALTH              
 
    DEFICIENCIE          CENTER                  
 
  S                           
 
                
 
 42662        UNSPECIFIED  2012   [facility]             
 
               VIRAL                 [facility]                    
 
    INFECTION                                   
 
  IN CCE &      
 
  UNS SITE      
 
                
 
         
 
 490          BRONCHITIS   2011   [facility]             
 
              NOT                 [facility]                    
 
    SPECIFIED                                   
 
  AS ACUTE OR     
 
   CHRONIC      
 
                
 
         
 
 02689        ASTHMA   2011   [facility]             
 
              UNSPECIFIED                [facility]                    
 
     WITH                                   
 
  EXACERBATIO     
 
  N             
 
             
 
 20908        ACUTE   2011   [facility]             
 
              BRONCHOSPAS                [facility]                    
 
    M                                            
 
               
 
 57901        OTHER   2011   [facility]              
 
              DYSPNEA AND               [facility]                     
 
                                      
 
  RESPIRATORY     
 
      
 
  ABNORMALITI   
 
  ES            
 
              
 
 4659         ACUTE URIS   10-   [facility]              
 
              OF                DON                     
 
    UNSPECIFIED                                 
 
   SITE           
 
                
 
      
 
 93194        OTHER   2011   [facility]              
 
              SPECIFIED                DON                     
 
    DISORDERS                                  
 
  OF URINARY      
 
  TRACT         
 
                
 
      
 
 5589         OTH&UNSPEC   2011   [facility]              
 
              NONINFECTIO               DON                     
 
    US                                  
 
  GASTROENTER     
 
  ITIS&COLITI   
 
  S             
 
             
 
         OT GENERAL  2011   [facility]             
 
                               HEALTH              
 
    CNSL&ADVICE          CENTER                  
 
   CONTRACEPT                 
 
   MANAGEMENT      
 
                
 
            
 
 3670         HYPERMETROP  2011   [facility]              
 
              IA                        VISION                  
 
                                               
 
         
 
 4778         ALLERGIC   2011   [facility]              
 
              RHINITIS                SOM                     
 
    DUE TO                                  
 
  OTHER      
 
  ALLERGEN      
 
                
 
         
 
 7862         COUGH        2011   [facility]              
 
                                        SOM                     
 
                                      
 
      
 
 80307        EXTRINSIC   2011   [facility]              
 
              ASTHMA,                SOM                     
 
    WITH                                  
 
  EXACERBATIO     
 
  N             
 
             
 
         GENERAL   10-   [facility]             
 
              CNSL                 HEALTH              
 
    INITIATION           CENTER                  
 
  OTH                  
 
  CONTRACEPT       
 
  MEASURES      
 
                
 
         
 
         PREGNANCY   10-   [facility]             
 
              EXAMINATION                HEALTH              
 
     OR TEST           CENTER                  
 
  NEGATIVE                  
 
  RESULT           
 
                
 
       
 
 7881         DYSURIA      2010   [facility]              
 
                                        DON                     
 
                                        
 
      
 
 3829         UNSPECIFIED  2010   [facility],              
 
               OTITIS                DON R                   
 
    MEDIA                                        
 
                    
 
      
 
 72242        OTHER   2010   [facility],              
 
              CHRONIC                SOM B                   
 
    ALLERGIC                                   
 
  CONJUNCTIVI       
 
  TIS           
 
               
 
 92983        ESOPHAGEAL   2010   [facility],              
 
              REFLUX                    SOM B                   
 
                                                 
 
           
 
 4779         ALLERGIC   2009   [facility],              
 
              RHINITIS                DON R                   
 
    CAUSE                                   
 
  UNSPECIFIED       
 
                
 
            
 
 6262         EXCESSIVE   2008   [facility],              
 
              OR FREQUENT               DON R                   
 
                                       
 
  MENSTRUATIO       
 
  N             
 
             
 
 7831         ABNORMAL   2008   [facility]              
 
              WEIGHT GAIN               [facility]              
 
                         INC                     
 
                         
 
 V069         NEED PROPH   2008   [facility]              
 
              VACCINATION               HEALTH              
 
     W/UNSPEC           CENTRAL              
 
  COMB    BANK ACCT     
 
  VACCINE                   
 
                      
 
        
 
 0340         STREPTOCOCC  2008   [facility]             
 
              [facility]             
 
    THROAT                                       
 
                          
 
       
 
 7821         RASH AND   2008   [facility],              
 
              OTHER                DON R                   
 
    NONSPECIFIC                                  
 
   SKIN        
 
  ERUPTION      
 
                
 
         
 
 6828         CELLULITIS   2008   [facility],              
 
              AND ABSCESS               DON R                   
 
     OF OTHER                                   
 
  SPECIFIED        
 
  SITE          
 
                
 
 J20.9        ACUTE                                        
 
              BRONCHITIS,                                       
 
                                            
 
  UNSPECIFIED             
 
                
 
            
 
                                                                                
                                                                                
                                                                                
                                                                                
                                                                                
                                                                                
                                                                                
                                                                                
                                                                                
                                                                                
        
 
Medications
                      
 
 
 Na  ND  Rx  Da  Fi  Fi  Am  Da  Di  Ph  RX  Ph  St
 
 me  C   No  te  ll  ll  ou  ys  ag  ar   #  ys  at
 
         rm        s   nt      no  ma      ic  us
 
             Or  Da              si  cy      ia    
 
             de  te              s           n     
 
             re                                    
 
             d                                     
 
                                                   
 
                                                   
 
                                                   
 
                                                  
 
                                   
 
                           
 
                      
 
               
 
              
 
 VI  65      10  11      4.  28          00  WA  Ac
 
 T   38      -1  -1      00              00  L-  ti
 
 D2  00      2-  7-      0               07  MA  ve
 
    73      20  20                      51  RT    
 
 1.  70      17  17                      50       
 
 25  6                                   83  PH    
 
                                            AR    
 
 MG                                          MA    
 
                                            CY    
 
 (5                                              
 
 0,                                    #5    
 
 00                                   91 
 
 0                                     
 
 UN                                    
 
 IT                             
 
 )                         
 
                           
 
                           
 
                  
 
                  
 
                  
 
               
 
               
 
               
 
              
 
 SV  81      10  11      30  30          00  [place]  Ac
 
    13      -1  -1      .0              00  L-  ti
 
 VI  10      2-  7-      00              08  MA  ve
 
 TA  31      20  20                      84  RT    
 
 MI  27      17  17                      15       
 
 N   1                                   82  PH    
 
 D3                                          AR    
 
                                            MA    
 
 5,                                          CY    
 
 00                                              
 
 0                                     #5    
 
 UN                                    91 
 
 IT                                    
 
                                      
 
 SF                             
 
 TG                        
 
 L                         
 
                           
 
                  
 
                  
 
                  
 
               
 
               
 
               
 
               
 
              
 
 NA  65      10  11      60  30          00  [place]  Ac
 
 OH  16      -1  -1      .0              00  L-  ti
 
 OX  20      2  7-      00              07  MA  ve
 
 EN  19      20  20                      51  RT    
 
    01      17  17                      50       
 
 50  1                                   84  PH    
 
 0                                           AR    
 
 MG                                          MA    
 
                                            CY    
 
 TA                                              
 
 BL                                    #5    
 
 ET                                    91 
 
                                       
 
                                       
 
                                
 
                           
 
                           
 
                           
 
                  
 
                  
 
                  
 
 [place]  60      10  11      5.  7           00  [place]  Ac
 
 LF  75      -1  -1      00              00  L-  ti
 
 AC  80      6-  7-      0               07  MA  ve
 
 ET  01      20  20                      51  RT    
 
 AM  80      17  17                      56       
 
 ID  5                                   63  PH    
 
 E                                           AR    
 
 10                                          MA    
 
 %                                           CY    
 
 EY                                             
 
 E                                     #5    
 
 DR                                   91 
 
 OP                                    
 
 S                                     
 
                                
 
                           
 
                           
 
                           
 
                  
 
                  
 
                  
 
               
 
              
 
 AZ  50      10  11      6.  5           00  [place]  Ac
 
 IT  11      -1  -1      00              00  L-  ti
 
 HR  10      6-  7-      0               07  MA  ve
 
 OM  78      20  20                      51  RT    
 
 YC  75      17  17                      56       
 
 IN  1                                   62  PH    
 
                                            AR    
 
 25                                          MA    
 
 0                                           CY    
 
 MG                                             
 
                                      #5    
 
 TA                                   91 
 
 BL                                    
 
 ET                                    
 
                                
 
                           
 
                           
 
                           
 
                  
 
                  
 
                  
 
               
 
               
 
 OH  00      09  11      20  10          00  [place]  Ac
 
 OM  60      -2  -0      0.              00  L-  ti
 
 ET  31      9-  3-      00              07  MA  ve
 
 HA  58      20  20      0               51  RT    
 
 ZI  65      17  17                      26       
 
 NE  8                                   76  PH    
 
 -D                                          AR    
 
 M                                           MA    
 
 SY                                          CY    
 
 RU                                              
 
 P                                     #5    
 
                                       91 
 
                                         
 
                                       
 
                                
 
                           
 
                           
 
                           
 
                  
 
                 
 
               
 
 LO  00      09  11      30  30          00  [place]  Ac
 
 RA  78      -2  -0      .0              00  L-  ti
 
 TA  15      9-  3-      00              08  MA  ve
 
 DI  07      20  20                      84  RT    
 
 NE  70      17  17                      13       
 
    1                                   82  PH    
 
 10                                          AR    
 
                                            MA    
 
 MG                                          CY    
 
                                                
 
 TA                                    #5    
 
 BL                                    91 
 
 ET                                    
 
                                       
 
                                
 
                           
 
                           
 
                           
 
                  
 
                  
 
                  
 
               
 
 ME  59      08  09      1.  90          00  CL  Ac
 
 DR  76      -1  -2      00              00  IN  ti
 
 OX  24      8-  2-      0               00  IC  ve
 
 YP  53      20  20                      40       
 
 RO  80      17  17                      75  PH    
 
 GE  2                                   72  AR    
 
 ST                                          MA    
 
 ER                                          CY    
 
 ON                                                
 
 E                                                
 
 15                                          
 
 0                                       
 
 MG                                    
 
 /M                                    
 
 L                              
 
                           
 
                           
 
                        
 
               
 
               
 
               
 
               
 
               
 
              
 
 BE  68      08  09      15  5           00  [place]  Ac
 
 NZ  38      -0  -0      .0              00  L-  ti
 
 ON  20      3-  8-      00              07  MA  ve
 
 AT  24      20  20                      50  RT    
 
 AT  70      17  17                      20       
 
 E   1                                   58  PH    
 
 10                                          AR    
 
 0                                           MA    
 
 MG                                          CY    
 
                                               
 
 CA                                    #5    
 
 PS                                    91 
 
 UL                                    
 
 E                                     
 
                                
 
                           
 
                           
 
                           
 
                  
 
                  
 
                  
 
               
 
              
 
 AZ  59      08  09      4.  4           00  [place]  Ac
 
 IT  76      -0  -0      00              00  L-  ti
 
 HR  23      3-  8-      0               07  MA  ve
 
 OM  06      20  20                      50  RT    
 
 YC  00      17  17                      20       
 
 IN  2                                   59  PH    
 
                                            AR    
 
 25                                          MA    
 
 0                                           CY    
 
 MG                                             
 
                                      #5    
 
 TA                                   91 
 
 BL                                    
 
 ET                                    
 
                                
 
                           
 
                           
 
                           
 
                  
 
                  
 
                  
 
               
 
               
 
 BR  60      07  08      40  7           00  [place]  Ac
 
 OM  43      -2  -2      0.              00  L-  ti
 
 PH  20      6-  5-      00              07  MA  ve
 
 EN  27      20  20      0               50  RT    
 
 IR  51      17  17                      08       
 
 -P  6                                   19  PH    
 
 SE                                          AR    
 
 UD                                          MA    
 
 OE                                          CY    
 
 PH                                             
 
 ED                                    #5    
 
 -D                                    91 
 
 M                                       
 
 SY                                    
 
 R                              
 
                           
 
                           
 
                           
 
                  
 
                  
 
                  
 
               
 
               
 
              
 
 NA  65      07  08      28  14          00  [place]  Ac
 
 OH  16      -1  -1      .0              00  L-  ti
 
 OX  20      8-  8-      00              07  MA  ve
 
 EN  19      20  20                      49  RT    
 
    01      17  17                      93       
 
 50  1                                   43  PH    
 
 0                                           AR    
 
 MG                                          MA    
 
                                            CY    
 
 TA                                              
 
 BL                                    #5    
 
 ET                                    91 
 
                                       
 
                                       
 
                                
 
                           
 
                           
 
                           
 
                  
 
                  
 
                  
 
 ME  59      05  06      1.  90          00  CL  
 
 DR  76      -2  -2      00              00  IN  ti
 
 OX  24      4-  3-      0               00  IC  ve
 
 YP  53      20  20                      40       
 
 RO  80      17  17                      75  PH    
 
 GE  2                                   72  AR    
 
 ST                                          MA    
 
 ER                                          CY    
 
 ON                                                
 
 E                                                
 
 15                                          
 
 0                                       
 
 MG                                    
 
 /M                                    
 
 L                              
 
                           
 
                           
 
                        
 
               
 
               
 
               
 
               
 
               
 
              
 
 BR  60      05  06      15  3           00  [facility]
 
 OM  43      -1  -1      0.              00  L-  ti
 
 PH  20      1-  6-      00              07  MA  ve
 
 EN  27      20  20      0               48  RT    
 
 IR  51      17  17                      74       
 
 -P  6                                   48  PH    
 
 SE                                          AR    
 
 UD                                          MA    
 
 OE                                          CY    
 
 PH                                             
 
 ED                                    #5    
 
 -D                                    91 
 
 M                                       
 
 SY                                    
 
 R                              
 
                           
 
                           
 
                           
 
                  
 
                  
 
                  
 
               
 
               
 
              
 
 VE  00      05  06      18  17          00  [facility]
 
 NT  17      -1  -1      .0              00  L-  ti
 
 OL  30      1-  6-      00              07  MA  ve
 
 IN  68      20  20                      48  RT    
 
    22      17  17                      74       
 
 HF  0                                   50  PH    
 
 A                                           AR    
 
 90                                          MA    
 
                                            CY    
 
 MC                                              
 
 G                                     #5    
 
 IN                                    91 
 
 [place]                                    
 
 LE                                    
 
 R                              
 
                           
 
                           
 
                           
 
                  
 
                  
 
                  
 
               
 
               
 
              
 
 CE  68      05  06      20  10          00  [facility]
 
 FD  18      -1  -1      .0              00  L-  ti
 
 IN  00      2-  6-      00              07  MA  ve
 
 IR  71      20  20                      48  RT    
 
    16      17  17                      76       
 
 30  0                                   54  PH    
 
 0                                           AR    
 
 MG                                          MA    
 
                                            CY    
 
 CA                                              
 
 PS                                    #5    
 
 UL                                    91 
 
 E                                     
 
                                       
 
                                
 
                           
 
                           
 
                           
 
                  
 
                  
 
                  
 
              
 
 AZ  59      05  06      6.  5           00  [facility]
 
 IT  76      -1  -1      00              00  L-  ti
 
 HR  23      6-  6-      0               07  MA  ve
 
 OM  06      20  20                      48  RT    
 
 YC  00      17  17                      81       
 
 IN  1                                   07  PH    
 
                                            AR    
 
 25                                          MA    
 
 0                                           CY    
 
 MG                                             
 
                                      #5    
 
 TA                                   91 
 
 BL                                    
 
 ET                                    
 
                                
 
                           
 
                           
 
                           
 
                  
 
                  
 
                  
 
               
 
               
 
 BE  68      05  06      15  5           00  [facility]
 
 NZ  38      -1  -1      .0              00  L-  ti
 
 ON  20      6-  6-      00              07  MA  ve
 
 AT  24      20  20                      48  RT    
 
 AT  70      17  17                      81       
 
 E   1                                   08  PH    
 
 10                                          AR    
 
 0                                           MA    
 
 MG                                          CY    
 
                                               
 
 CA                                    #5    
 
 PS                                    91 
 
 UL                                    
 
 E                                     
 
                                
 
                           
 
                           
 
                           
 
                  
 
                  
 
                  
 
               
 
              
 
 ME  59      03  03      1.  90          00  CL  
 
 DR  76      -0  -3      00              00  IN  ti
 
 OX  24      1-  1-      0               00  IC  ve
 
 YP  53      20  20                      40       
 
 RO  80      17  17                      75  PH    
 
 GE  2                                   72  AR    
 
 ST                                          MA    
 
 ER                                          CY    
 
 ON                                                
 
 E                                                
 
 15                                          
 
 0                                       
 
 MG                                    
 
 /M                                    
 
 L                              
 
                           
 
                           
 
                        
 
               
 
               
 
               
 
               
 
               
 
              
 
 BR  60      12  01      20  4           00  [facility]
 
 OM  43      -1  -1      0.              00  L-  ti
 
 PH  20      4-  3-      00              07  MA  ve
 
 EN  27      20  20      0               45  RT    
 
 IR  51      16  17                      85       
 
 -P  6                                   18  PH    
 
 SE                                          AR    
 
 UD                                          MA    
 
 OE                                          CY    
 
 PH                                             
 
 ED                                    #5    
 
 -D                                    91 
 
 M                                       
 
 SY                                    
 
 R                              
 
                           
 
                           
 
                           
 
                  
 
                  
 
                  
 
               
 
               
 
              
 
 ME  59      12  01      1.  90          00  CL  Ac
 
 DR  76      -0  -0      00              00  IN  ti
 
 OX  24      7-  9-      0               00  IC  ve
 
 YP  53      20  20                      40       
 
 RO  80      16  17                      75  PH    
 
 GE  2                                   72  AR    
 
 ST                                          MA    
 
 ER                                          CY    
 
 ON                                                
 
 E                                                
 
 15                                          
 
 0                                       
 
 MG                                    
 
 /M                                    
 
 L                              
 
                           
 
                           
 
                        
 
               
 
               
 
               
 
               
 
               
 
              
 
 FL  00      10  10  1   16  30      EA  24  ST  Ac
 
 UT  05      -1  -1      .0          ST  57  EP  ti
 
 IC  43      9-  9-      00          SI  37  HE  ve
 
 AS  27      20  20                  DE      NS    
 
 ON  09      11  11                              
 
 E   9                               PH      DO    
 
 OH                                  AR      N     
 
 OP                                  MA      R     
 
                                    CY            
 
 50                                             
 
                           OF            
 
 MC                                   
 
 G                       CY      
 
 SP                      NT      
 
 RA               HI      
 
 Y              AN      
 
                A      
 
                     
 
                  
 
                  
 
                  
 
                  
 
                  
 
                  
 
                 
 
              
 
 BR  60      10  10  1   12  3       EA  24  ST  Ac
 
 OM  43      -1  -1      0.          ST  57  EP  ti
 
 FE  20      9-  9-      00          SI  38  HE  ve
 
 D   83      20  20      0           DE      NS    
 
 DM  71      11  11                              
 
    6                               PH      DO    
 
 CO                                  AR      N     
 
 UG                                  MA      R     
 
 H                                   CY            
 
 SY                                            
 
 RU                         OF            
 
 P                                    
 
                           CY      
 
                         NT      
 
                  HI      
 
                AN      
 
                A      
 
                     
 
                  
 
                  
 
                 
 
               
 
               
 
               
 
               
 
              
 
 OH  00      06  06  0   16  3       EA  23  ST  Ac
 
 OM  78      -2  -2      .0          ST  09  EP  ti
 
 ET  11      7-  7      00          SI  79  HE  ve
 
 HA  83      20  20                  DE      NS    
 
 ZI  01      11  11                              
 
 NE  0                               PH      DO    
 
                                    AR      N     
 
 25                                  MA      R     
 
                                    CY            
 
 MG                                            
 
                           OF            
 
 TA                                   
 
 BL                      CY      
 
 ET                      NT      
 
                  HI      
 
                AN      
 
                A      
 
                     
 
                  
 
                  
 
                  
 
                  
 
                  
 
               
 
               
 
              
 
 [place]  50      06  06  0   12  6       EA  23  ST  Ac
 
 LF  38      -2  -2      0.          ST  09  EP  ti
 
 AM  30      7-  7-      00          SI  80  HE  ve
 
 ET  82      20  20      0           DE      NS    
 
 HO  31      11  11                              
 
 XA  6                               PH      DO    
 
 ZO                                  AR      N     
 
 LE                                  MA      R     
 
 -T                                  CY            
 
 MP                                            
 
                           OF            
 
 [place]                                   
 
 SP                        CY      
 
                         NT      
 
                  HI      
 
                AN      
 
                A      
 
                     
 
                  
 
                  
 
                  
 
                  
 
               
 
               
 
               
 
              
 
 AM  00      05  05  0   30  10      EA  22  ST  Ac
 
 OX  78      -2  -2      .0          ST  65  EP  ti
 
 IC  12      4-  4-      00          SI  59  HE  ve
 
 IL  02      20  20                  DE      NS    
 
 LI  00      11  11                              
 
 N   5                               PH      DO    
 
 25                                  AR      N     
 
 0                                   MA      R     
 
 MG                                  CY            
 
                                               
 
 CA                         OF            
 
 PS                                   
 
 UL                      CY      
 
 E                       NT      
 
                  HI      
 
                AN      
 
                A      
 
                     
 
                  
 
                  
 
                  
 
                  
 
                 
 
               
 
               
 
              
 
 OH  00      02  02  0   12  4       EA  21  ST  Ac
 
 OM  78      -2  -2      .0          ST  40  EP  ti
 
 ET  11      5-  5-      00          SI  90  HE  ve
 
 HA  83      20  20                  DE      NS    
 
 ZI  01      11  11                              
 
 NE  0                               PH      DO    
 
                                    AR      N     
 
 25                                  MA      R     
 
                                    CY            
 
 MG                                            
 
                           OF            
 
 TA                                   
 
 BL                      CY      
 
 ET                      NT      
 
                  HI      
 
                AN      
 
                A      
 
                     
 
                  
 
                  
 
                  
 
                  
 
                  
 
               
 
               
 
              
 
 LO  45      01  02  6   30  30      EA  20  MA  Ac
 
 RA  80      -1  -2      .0          ST  86  SH  ti
 
 TA  20      8-  4-      00          SI  08  BU  ve
 
 DI  65      20  20                  DE      RN    
 
 NE  08      11  11                              
 
    7                               PH      AM    
 
 10                                  AR      Y     
 
                                    MA      B     
 
 MG                                  CY            
 
                                               
 
 TA                         OF            
 
 BL                                   
 
 ET                      CY      
 
                         NT      
 
                  HI      
 
                AN      
 
                A      
 
                     
 
                  
 
                  
 
                  
 
                  
 
               
 
               
 
               
 
              
 
     00      01  02  6   30  30      EA  20  CO  Ac
 
     00        -2      .0          ST  86  MM  ti
 
     60      8-  4      00          SI  10  UN  ve
 
     11      20  20                  DE      IT    
 
     73      11  11                         Y     
 
     1                               PH      AL    
 
                                     AR      LE    
 
                                     MA      RG    
 
                                     CY      Y     
 
                                          &     
 
                            OF      AS    
 
                                   TH 
 
                         CY   MA 
 
                         NT     
 
                  HI   PS 
 
                AN   C  
 
                A       
 
                        
 
                  
 
                  
 
                  
 
                  
 
                  
 
                  
 
                 
 
              
 
     00      01  02  6   30  30      EA  20  MA  Ac
 
       -2      .0          ST  86  SH  ti
 
     60      8-  4      00          SI  10  BU  ve
 
     11      20  20                  DE      RN    
 
     73      11  11                              
 
     1                               PH      AM    
 
                                     AR      Y     
 
                                     MA      B     
 
                                     CY            
 
                                                
 
                            OF            
 
                                      
 
                         CY      
 
                         NT      
 
                  HI      
 
                AN      
 
                A      
 
                     
 
               
 
               
 
               
 
               
 
               
 
               
 
               
 
              
 
 AS  00      02  02  6   0.  15      EA  21  MA  Ac
 
 MA        24          ST  29  SH  ti
 
 NE  51      7-  7-      0           SI  45  BU  ve
 
 X   34      20  20                  DE      RN    
 
 TW  10      11  11                              
 
 IS  2                               PH      AM    
 
 TH                                  AR      Y     
 
 AL                                  MA      B     
 
 ER                                  CY            
 
                                               
 
 22                         OF            
 
 0                                   
 
 MC                      CY      
 
 G                       NT      
 
 #6               HI      
 
 0              AN      
 
                A      
 
                     
 
                  
 
                  
 
                  
 
                  
 
                  
 
                  
 
                 
 
              
 
 MA  51      02  02  0   59  1       EA  21  ST  Ac
 
 LA  67        -1      .0          ST  26  EP  ti
 
 TH  25      6          SI  49  HE  ve
 
 IO  27      20  20                  DE      NS    
 
 N   70      11  11                              
 
 0.  4                               PH      DO    
 
 5%                                  AR      N     
 
                                    MA      R     
 
 LO                                  CY            
 
 TI                                            
 
 ON                         OF            
 
                                      
 
                         CY      
 
                         NT      
 
                  HI      
 
                AN      
 
                A      
 
                     
 
                  
 
                  
 
               
 
               
 
               
 
               
 
               
 
              
 
 OH  37      01  01  6   30  30      EA  20  MA  Ac
 
 IL    -1      .0          ST  86  SH  ti
 
 OS  00                SI  05  BU  ve
 
 EC  45      20  20                  DE      RN    
 
    50      11  11                              
 
 OT  2                               PH      AM    
 
 C                                   AR      Y     
 
 20                                  MA      B     
 
 .6                                  CY            
 
                                               
 
 MG                         OF            
 
                                     
 
 TA                      CY      
 
 BL                      NT      
 
 ET               HI      
 
                AN      
 
                A      
 
                     
 
                  
 
                  
 
                  
 
                  
 
                  
 
                  
 
               
 
              
 
 [place]  00      01  01  3   30  4       EA  20  MA  Ac
 
 ST  16        -1      .0          ST  86  SH  ti
 
 AT  80      8  8          SI  06  BU  ve
 
 IN  00      20  20                  DE      RN    
 
    73      11  11                              
 
 10  0                               PH      AM    
 
 0,                                  AR      Y     
 
 00                                  MA      B     
 
 0                                   CY            
 
 UN                                            
 
 IT                         OF            
 
 S/                                   
 
 GM                      CY      
 
                        NT      
 
 OI               HI      
 
 NT             AN      
 
                A      
 
                     
 
                  
 
                  
 
                  
 
                  
 
                  
 
                  
 
                  
 
              
 
 NA  00      01  01  6   17  30      EA  20  MA  Ac
 
 SO  08      -  -1      .0          ST  86  SH  ti
 
 NE  51      8  8      00          SI  07  BU  ve
 
 X   28      20  20                  DE      RN    
 
 50  80      11  11                              
 
    1                               PH      AM    
 
 MC                                  AR      Y     
 
 G                                   MA      B     
 
 NA                                  CY            
 
 SA                                             
 
 L                          OF            
 
 SP                                   
 
 RA                      CY      
 
 Y                       NT      
 
                  HI      
 
                AN      
 
                A      
 
                     
 
                  
 
                  
 
                  
 
                  
 
                 
 
               
 
               
 
              
 
 LO  45      01  01  6   30  30      EA  20  MA  Ac
 
 RA  80      -1  -1      .0          ST  86  SH  ti
 
 TA  20      8-  8-      00          SI  08  BU  ve
 
 DI  65      20  20                  DE      RN    
 
 NE  08      11  11                              
 
    7                               PH      AM    
 
 10                                  AR      Y     
 
                                    MA      B     
 
 MG                                  CY            
 
                                               
 
 TA                         OF            
 
 BL                                   
 
 ET                      CY      
 
                         NT      
 
                  HI      
 
                AN      
 
                A      
 
                     
 
                  
 
                  
 
                  
 
                  
 
               
 
               
 
               
 
              
 
 VE  00      01  01  3   18  18      EA  20  MA  Ac
 
 NT  17      -1  -1      .0          ST  86  SH  ti
 
 OL  30      8-  8-      00          SI  09  BU  ve
 
 IN  68      20  20                  DE      RN    
 
    22      11  11                              
 
 HF  0                               PH      AM    
 
 A                                   AR      Y     
 
 90                                  MA      B     
 
                                    CY            
 
 MC                                             
 
 G                          OF            
 
 IN                                   
 
 HA                      CY      
 
 LE                      NT      
 
 R                HI      
 
                AN      
 
                A      
 
                     
 
                  
 
                  
 
                  
 
                  
 
                  
 
                 
 
               
 
              
 
     00      01  01  6   30  30      EA  20  CO  Ac
 
     00      -1  -1      .0          ST  86  MM  ti
 
     60      8-  8-      00          SI  10  UN  ve
 
     11      20  20                  DE      IT    
 
     73      11  11                         Y     
 
     1                               PH      AL    
 
                                     AR      LE    
 
                                     MA      RG    
 
                                     CY      Y     
 
                                          &     
 
                            OF      AS    
 
                                   TH 
 
                         CY   MA 
 
                         NT     
 
                  HI   PS 
 
                AN   C  
 
                A       
 
                        
 
                  
 
                  
 
                  
 
                  
 
                  
 
                  
 
                 
 
              
 
     00      01  01  6   30  30      EA  20  MA  Ac
 
     00      -1  -1      .0          ST  86  SH  ti
 
     60      8-  8-      00          SI  10  BU  ve
 
     11      20  20                  DE      RN    
 
     73      11  11                              
 
     1                               PH      AM    
 
                                     AR      Y     
 
                                     MA      B     
 
                                     CY            
 
                                                
 
                            OF            
 
                                      
 
                         CY      
 
                         NT      
 
                  HI      
 
                AN      
 
                A      
 
                     
 
               
 
               
 
               
 
               
 
               
 
               
 
               
 
              
 
 LO  45      11  11  0   30  30      EA  19  ST  Ac
 
 RA  80      -0  -0      .0          ST  89  EP  ti
 
 TA  20      9-  9-      00          SI  81  HE  ve
 
 DI  65      20  20                  DE      NS    
 
 NE  08      10  10                              
 
    7                               PH      DO    
 
 10                                  AR      N     
 
                                    MA      R     
 
 MG                                  CY            
 
                                               
 
 TA                         OF            
 
 BL                                   
 
 ET                      CY      
 
                         NT      
 
                  HI      
 
                AN      
 
                A      
 
                     
 
                  
 
                  
 
                  
 
                  
 
               
 
               
 
               
 
              
 
 AZ  00      11  11  0   6.  6       EA  19  ST  Ac
 
 IT  09      -0  -0      00          ST  89  EP  ti
 
 HR  37      9-  9-      0           SI  82  HE  ve
 
 OM  14      20  20                  DE      NS    
 
 YC  61      10  10                              
 
 IN  8                               PH      DO    
 
                                    AR      N     
 
 25                                  MA      R     
 
 0                                   CY            
 
 MG                                            
 
                           OF            
 
 TA                                  
 
 BL                      CY      
 
 ET                      NT      
 
                  HI      
 
                AN      
 
                A      
 
                     
 
                  
 
                  
 
                  
 
                  
 
                  
 
               
 
               
 
              
 
 [place]  53      09  09  0   14  7       EA  19  ST  Ac
 
 LF  74      -2  -2      .0          ST  29  EP  ti
 
 AM  60      5-  5-      00          SI  06  HE  ve
 
 ET  27      20  20                  DE      NS    
 
 HO  20      10  10                              
 
 XA  5                               PH      DO    
 
 ZO                                  AR      N     
 
 LE                                  MA      R     
 
 -T                                  CY            
 
 MP                                            
 
                           OF            
 
 DS                                   
 
                        CY      
 
 TA                      NT      
 
 BL               HI      
 
 ET             AN      
 
                A      
 
                     
 
                  
 
                  
 
                  
 
                  
 
                  
 
                  
 
                  
 
              
 
     00      09  09  0   6.  3       EA  19  RU  Ac
 
     59      -2  -2      00          ST  25  SH  ti
 
     10      2-  2-      0           SI  63     ve
 
     50      20  20                  DE      NE    
 
     20      10  10                         IL    
 
     1                               PH       C    
 
                                     AR            
 
                                     MA            
 
                                     CY            
 
                                               
 
                            OF            
 
                                     
 
                         CY      
 
                         NT      
 
                  HI      
 
                AN   
 
                A    
 
                     
 
               
 
               
 
               
 
               
 
               
 
               
 
               
 
              
 
 AZ  00      08  08  0   6.  6       EA  18  ST  Ac
 
 IT  09      -2  -2      00          ST  78  EP  ti
 
 HR  37      0-  0-      0           SI  86  HE  ve
 
 OM  14      20  20                  DE      NS    
 
 YC  61      10  10                              
 
 IN  8                               PH      DO    
 
                                    AR      N     
 
 25                                  MA      R     
 
 0                                   CY            
 
 MG                                            
 
                           OF            
 
 TA                                  
 
 BL                      CY      
 
 ET                      NT      
 
                  HI      
 
                AN      
 
                A      
 
                     
 
                  
 
                  
 
                  
 
                  
 
                  
 
               
 
               
 
              
 
 SE  45      03  07  3   12  15      EA  16  ST  Ac
 
 LE  80      -2  -2      0.          ST  91  EP  ti
 
 NI  20      4-  6-      00          SI  94  HE  ve
 
 UM  04      20  20      0           DE      NS    
 
    06      10  10                              
 
 [place]  4                               PH      DO    
 
 LF                                  AR      N     
 
 ID                                  MA      R     
 
 E                                   CY            
 
 2.                                             
 
 5%                         OF            
 
                                     
 
 LO                        CY      
 
 TI                      NT      
 
 ON               HI      
 
                AN      
 
                A      
 
                     
 
                  
 
                  
 
                  
 
                  
 
                  
 
                  
 
               
 
              
 
 NA  00      03  03  0   17  30      EA  16  MA  Ac
 
 SO  08      -2  -2      .0          ST  96  SH  ti
 
 NE  51      7-  7-      00          SI  78  BU  ve
 
 X   28      20  20                  DE      RN    
 
 50  80      10  10                              
 
    1                               PH      AM    
 
 MC                                  AR      Y     
 
 G                                   MA      B     
 
 NA                                  CY            
 
 SA                                             
 
 L                          OF            
 
 SP                                   
 
 RA                      CY      
 
 Y                       NT      
 
                  HI      
 
                AN      
 
                A      
 
                     
 
                  
 
                  
 
                  
 
                  
 
                 
 
               
 
               
 
              
 
 LO  45      03  03  0   30  30      EA  16  MA  Ac
 
 RA  80      -2  -2      .0          ST  96  SH  ti
 
 TA  20      7-  7-      00          SI  79  BU  ve
 
 DI  65      20  20                  DE      RN    
 
 NE  08      10  10                              
 
    7                               PH      AM    
 
 10                                  AR      Y     
 
                                    MA      B     
 
 MG                                  CY            
 
                                               
 
 TA                         OF            
 
 BL                                   
 
 ET                      CY      
 
                         NT      
 
                  HI      
 
                AN      
 
                A      
 
                     
 
                  
 
                  
 
                  
 
                  
 
               
 
               
 
               
 
              
 
     00      03  03  0   30  30      EA  16  CO  Ac
 
     00      -2  -2      .0          ST  96  MM  ti
 
     60      7-  7-      00          SI  80  UN  ve
 
     11      20  20                  DE      IT    
 
     73      10  10                         Y     
 
     1                               PH      AL    
 
                                     AR      LE    
 
                                     MA      RG    
 
                                     CY      Y     
 
                                          &     
 
                            OF      AS    
 
                                   TH 
 
                         CY   MA 
 
                         NT     
 
                  HI   PS 
 
                AN   C  
 
                A       
 
                        
 
                  
 
                  
 
                  
 
                  
 
                  
 
                  
 
                 
 
              
 
     00      03  03  0   30  30      EA  16  MA  Ac
 
     00      -2  -2      .0          ST  96  SH  ti
 
     60      7-  7-      00          SI  80  BU  ve
 
     11      20  20                  DE      RN    
 
     73      10  10                              
 
     1                               PH      AM    
 
                                     AR      Y     
 
                                     MA      B     
 
                                     CY            
 
                                                
 
                            OF            
 
                                      
 
                         CY      
 
                         NT      
 
                  HI      
 
                AN      
 
                A      
 
                     
 
               
 
               
 
               
 
               
 
               
 
               
 
               
 
              
 
 ME  00      03  03  0   21  6       EA  16  ST  Ac
 
 TH  78      -2  -2      .0          ST  91  EP  ti
 
 YL  15      4-  4-      00          SI  92  HE  ve
 
 OH  02      20  20                  DE      NS    
 
 ED  20      10  10                              
 
 NI  7                               PH      DO    
 
 SO                                  AR      N     
 
 LO                                  MA      R     
 
 NE                                  CY            
 
  4                                            
 
                           OF            
 
 MG                                   
 
                        CY      
 
 DO                      NT      
 
 SE               HI      
 
 PK             AN      
 
                A      
 
                     
 
                  
 
                  
 
                  
 
                  
 
                  
 
                  
 
                  
 
              
 
 AM  00      03  03  0   30  10      EA  16  ST  Ac
 
 OX  78      -2  -2      .0          ST  91  EP  ti
 
 IC  12      4-  4-      00          SI  93  HE  ve
 
 IL  02      20  20                  DE      NS    
 
 LI  00      10  10                              
 
 N   5                               PH      DO    
 
 25                                  AR      N     
 
 0                                   MA      R     
 
 MG                                  CY            
 
                                               
 
 CA                         OF            
 
 PS                                   
 
 UL                      CY      
 
 E                       NT      
 
                  HI      
 
                AN      
 
                A      
 
                     
 
                  
 
                  
 
                  
 
                  
 
                 
 
               
 
               
 
              
 
 SE  45      03  03  3   12  15      EA  16  ST  Ac
 
 LE  80      -2  -2      0.          ST  91  EP  ti
 
 NI  20      4-  4-      00          SI  94  HE  ve
 
 UM  04      20  20      0           DE      NS    
 
    06      10  10                              
 
 [place]  4                               PH      DO    
 
 LF                                  AR      N     
 
 ID                                  MA      R     
 
 E                                   CY            
 
 2.                                             
 
 5%                         OF            
 
                                     
 
 LO                        CY      
 
 TI                      NT      
 
 ON               HI      
 
                AN      
 
                A      
 
                     
 
                  
 
                  
 
                  
 
                  
 
                  
 
                  
 
               
 
              
 
 OH  68      02  02  00  12  2       WA  70  ST  Ac
 
 OM  38      -1  -2      .0          L-  58  EP  ti
 
 ET  20      4-  6-      00          MA  50  HE  ve
 
 HA  04      20  20                  RT  9   NS    
 
 ZI  10      10  10                              
 
 NE  1                               PH      DO    
 
                                    AR      N     
 
 25                                  MA      R     
 
                                    CY            
 
 MG                                             
 
                           #5            
 
 TA                         91         
 
 BL                                
 
 ET                              
 
                          
 
                        
 
                       
 
                     
 
                  
 
                  
 
                  
 
               
 
               
 
 AM  00      02  02  00  21  7       EA  16  ST  Ac
 
 OX  78      -1  -2      .0          ST  38  EP  ti
 
 IC  12      6-  6-      00          SI  63  HE  ve
 
 IL  61      20  20                  DE      NS    
 
 LI  30      10  10                              
 
 N   5                               PH      DO    
 
 50                                  AR      N     
 
 0                                   MA      R     
 
 MG                                  CY            
 
                                               
 
 CA                         OF            
 
 PS                         CY         
 
 UL                      NT      
 
 E                       HI      
 
                  AN      
 
                A       
 
                       
 
                     
 
                  
 
                  
 
                  
 
                  
 
                 
 
               
 
              
 
 LO  45      02  02  00  14  14      EA  16  ST  Ac
 
 RA  80      -1  -2      .0          ST  38  EP  ti
 
 TA  20      6-  6-      00          SI  64  HE  ve
 
 DI  65      20  20                  DE      NS    
 
 NE  08      10  10                              
 
    7                               PH      DO    
 
 10                                  AR      N     
 
                                    MA      R     
 
 MG                                  CY            
 
                                                
 
 TA                         OF            
 
 BL                         CY         
 
 ET                      NT      
 
                         HI      
 
                  AN      
 
                A       
 
                       
 
                     
 
                  
 
                  
 
                  
 
                  
 
               
 
               
 
              
 
 LO  45      01  12  03  30  30      EA  11  CO  Ac
 
 RA  80      -1  -0      .0          ST  08  MM  ti
 
 TA  20      3-  3-      00          SI  13  UN  ve
 
 DI  65      20  20                  DE      IT    
 
 NE  08      09  09                         Y     
 
    7                               PH      AL    
 
 10                                  AR      LE    
 
                                    MA      RG    
 
 MG                                  CY      Y     
 
                                          &     
 
 TA                         OF      AS    
 
 BL                         CY      TH 
 
 ET                      NT   MA 
 
                         HI     
 
                  AN   PS 
 
                A    C  
 
                        
 
                        
 
                     
 
                     
 
                     
 
                     
 
                  
 
                  
 
                
 
 NA  00      01  12  01  17  30      EA  11  CO  Ac
 
 SO  08      -1  -0      .0          ST  08  MM  ti
 
 NE  51      3-  3-      00          SI  11  UN  ve
 
 X   28      20  20                  DE      IT    
 
 50  80      09  09                         Y     
 
    1                               PH      AL    
 
                                   AR      LE    
 
 G                                   MA      RG    
 
 NA                                  CY      Y     
 
 SA                                        &     
 
 L                          OF      AS    
 
 SP                         CY      TH 
 
 RA                      NT   MA 
 
 Y                       HI     
 
                  AN   PS 
 
                A    C  
 
                        
 
                        
 
                     
 
                     
 
                     
 
                     
 
                    
 
                  
 
                
 
     00      01  12  04  30  30      EA  11  CO  Ac
 
     00      -1  -0      .0          ST  08  MM  ti
 
     60      3-  3-      00          SI  14  UN  ve
 
     11      20  20                  DE      IT    
 
     73      09  09                         Y     
 
     1                               PH      AL    
 
                                     AR      LE    
 
                                     MA      RG    
 
                                     CY      Y     
 
                                           &     
 
                            OF      AS    
 
                            CY      TH 
 
                         NT   MA 
 
                         HI     
 
                  AN   PS 
 
                A    C  
 
                        
 
                        
 
                  
 
                  
 
                  
 
                  
 
                  
 
                  
 
                
 
 LO  60      01  11  02  30  30      EA  11  CO  Ac
 
 RA  50      -1  -1      .0          ST  08  MM  ti
 
 TA  50      3-  9-      00          SI  13  UN  ve
 
 DI  14      20  20                  DE      IT    
 
 NE  70      09  09                         Y     
 
    8                               PH      AL    
 
 10                                  AR      LE    
 
                                    MA      RG    
 
 MG                                  CY      Y     
 
                                          &     
 
 TA                         OF      AS    
 
 BL                         CY      TH 
 
 ET                      NT   MA 
 
                         HI     
 
                  AN   PS 
 
                A    C  
 
                        
 
                        
 
                     
 
                     
 
                     
 
                     
 
                  
 
                  
 
                
 
 AZ  00      09  09  00  6.  5       EA  14  ST  Ac
 
 IT  09      -1  -2      00          ST  28  EP  ti
 
 HR  37      6-  4-      0           SI  03  HE  ve
 
 OM  14      20  20                  DE      NS    
 
 YC  61      09  09                              
 
 IN  8                               PH      DO    
 
                                    AR      N     
 
 25                                  MA      R     
 
 0                                   CY            
 
 MG                                             
 
                           OF            
 
 TA                        CY         
 
 BL                      NT      
 
 ET                      HI      
 
                  AN      
 
                A       
 
                       
 
                     
 
                  
 
                  
 
                  
 
                  
 
                  
 
               
 
              
 
 VE  00      07  08  00  18  18      EA  13  CO  Ac
 
 NT  17      -3  -1      .0          ST  68  MM  ti
 
 OL  30      1-  3-      00          SI  23  UN  ve
 
 IN  68      20  20                  DE      IT    
 
    22      09  09                         Y     
 
 HF  0                               PH      AL    
 
 A                                   AR      LE    
 
 90                                  MA      RG    
 
                                    CY      Y     
 
 MC                                        &     
 
 G                          OF      AS    
 
 IN                         CY      TH 
 
 FRANCIS                      NT   MA 
 
 LE                      HI     
 
 R                AN   PS 
 
                A    C  
 
                        
 
                        
 
                     
 
                     
 
                     
 
                     
 
                     
 
                    
 
                
 
 LO  60      01  06  01  30  30      EA  11  CO  Ac
 
 RA  50      -1  -1      .0          ST  08  MM  ti
 
 TA  50      3-  8-      00          SI  13  UN  ve
 
 DI  14      20  20                  DE      IT    
 
 NE  70      09  09                         Y     
 
    8                               PH      AL    
 
 10                                  AR      LE    
 
                                    MA      RG    
 
 MG                                  CY      Y     
 
                                          &     
 
 TA                         OF      AS    
 
 BL                         CY      TH 
 
 ET                      NT   MA 
 
                         HI     
 
                  AN   PS 
 
                A    C  
 
                        
 
                        
 
                     
 
                     
 
                     
 
                     
 
                  
 
                  
 
                
 
     00      01  06  03  30  30      EA  11  CO  Ac
 
     00      -1  -1      .0          ST  08  MM  ti
 
     60      3-  8-      00          SI  14  UN  ve
 
     11      20  20                  DE      IT    
 
     73      09  09                         Y     
 
     1                               PH      AL    
 
                                     AR      LE    
 
                                     MA      RG    
 
                                     CY      Y     
 
                                           &     
 
                            OF      AS    
 
                            CY      TH 
 
                         NT   MA 
 
                         HI     
 
                  AN   PS 
 
                A    C  
 
                        
 
                        
 
                  
 
                  
 
                  
 
                  
 
                  
 
                  
 
                
 
 [place]  37      01  06  04  30  30      EA  11  CO  Ac
 
 IL  00      -1  -1      .0          ST  08  MM  ti
 
 OS  00      3-  8-      00          SI  12  UN  ve
 
 EC  45      20  20                  DE      IT    
 
    50      09  09                         Y     
 
 OT  2                               PH      AL    
 
 C                                   AR      LE    
 
 20                                  MA      RG    
 
 .6                                  CY      Y     
 
                                          &     
 
 MG                         OF      AS    
 
                           CY      TH 
 
 TA                      NT   MA 
 
 BL                      HI     
 
 ET               AN   PS 
 
                A    C  
 
                        
 
                        
 
                     
 
                     
 
                     
 
                     
 
                     
 
                     
 
                
 
     00      01  05  02  30  30      EA  11  CO  Ac
 
     00      -1  -0      .0          ST  08  MM  ti
 
     60      3-  7-      00          SI  14  UN  ve
 
     11      20  20                  DE      IT    
 
     73      09  09                         Y     
 
     1                               PH      AL    
 
                                     AR      LE    
 
                                     MA      RG    
 
                                     CY      Y     
 
                                           &     
 
                            OF      AS    
 
                            CY      TH 
 
                         NT   MA 
 
                         HI     
 
                  AN   PS 
 
                A    C  
 
                        
 
                        
 
                  
 
                  
 
                  
 
                  
 
                  
 
                  
 
                
 
 [place]  37      01  05  03  30  30      EA  11  CO  Ac
 
 IL  00      -1  -0      .0          ST  08  MM  ti
 
 OS  00      3-  7-      00          SI  12  UN  ve
 
 EC  45      20  20                  DE      IT    
 
    50      09  09                         Y     
 
 OT  2                               PH      AL    
 
 C                                   AR      LE    
 
 20                                  MA      RG    
 
 .6                                  CY      Y     
 
                                          &     
 
 MG                         OF      AS    
 
                           CY      TH 
 
 TA                      NT   MA 
 
 BL                      HI     
 
 ET               AN   PS 
 
                A    C  
 
                        
 
                        
 
                     
 
                     
 
                     
 
                     
 
                     
 
                     
 
                
 
 LO  45      01  05  00  30  30      EA  11  CO  Ac
 
 RA  80      -1  -0      .0          ST  08  MM  ti
 
 TA  20      3-  7-      00          SI  13  UN  ve
 
 DI  65      20  20                  DE      IT    
 
 NE  08      09  09                         Y     
 
    7                               PH      AL    
 
 10                                  AR      LE    
 
                                    MA      RG    
 
 MG                                  CY      Y     
 
                                          &     
 
 TA                         OF      AS    
 
 BL                         CY      TH 
 
 ET                      NT   MA 
 
                         HI     
 
                  AN   PS 
 
                A    C  
 
                        
 
                        
 
                     
 
                     
 
                     
 
                     
 
                  
 
                  
 
                
 
     60      03  04  00  12  6       EA  12  ST  Ac
 
     25      -2  -0      0.          ST  01  EP  ti
 
     80      3-  9-      00          SI  46  HE  ve
 
     23      20  20      0           DE      NS    
 
     91      09  09                              
 
     6                               PH      DO    
 
                                     AR      N     
 
                                     MA      R     
 
                                     CY            
 
                                                
 
                            OF            
 
                            CY         
 
                           NT      
 
                         HI      
 
                  AN      
 
                A       
 
                       
 
                     
 
               
 
               
 
               
 
               
 
               
 
               
 
              
 
 AZ  00      03  04  00  6.  5       EA  12  ST  Ac
 
 IT  09      -2  -0      00          ST  01  EP  ti
 
 HR  37      3-  9-      0           SI  45  HE  ve
 
 OM  14      20  20                  DE      NS    
 
 YC  61      09  09                              
 
 IN  8                               PH      DO    
 
                                    AR      N     
 
 25                                  MA      R     
 
 0                                   CY            
 
 MG                                             
 
                           OF            
 
 TA                        CY         
 
 BL                      NT      
 
 ET                      HI      
 
                  AN      
 
                A       
 
                       
 
                     
 
                  
 
                  
 
                  
 
                  
 
                  
 
               
 
              
 
     00      01  03  01  30  30      EA  11  CO  Ac
 
     00      -1  -2      .0          ST  08  MM  ti
 
     60      3-  6-      00          SI  14  UN  ve
 
     11      20  20                  DE      IT    
 
     73      09  09                         Y     
 
     1                               PH      AL    
 
                                     AR      LE    
 
                                     MA      RG    
 
                                     CY      Y     
 
                                           &     
 
                            OF      AS    
 
                            CY      TH 
 
                         NT   MA 
 
                         HI     
 
                  AN   PS 
 
                A    C  
 
                        
 
                        
 
                  
 
                  
 
                  
 
                  
 
                  
 
                  
 
                
 
 [place]  37      01  03  02  30  30      EA  11  CO  Ac
 
 IL  00      -1  -2      .0          ST  08  MM  ti
 
 OS  00      3-  6-      00          SI  12  UN  ve
 
 EC  45      20  20                  DE      IT    
 
    50      09  09                         Y     
 
 OT  2                               PH      AL    
 
 C                                   AR      LE    
 
 20                                  MA      RG    
 
 .6                                  CY      Y     
 
                                          &     
 
 MG                         OF      AS    
 
                           CY      TH 
 
 TA                      NT   MA 
 
 BL                      HI     
 
 ET               AN   PS 
 
                A    C  
 
                        
 
                        
 
                     
 
                     
 
                     
 
                     
 
                     
 
                     
 
                
 
 [place]  37      01  02  01  30  30      EA  11  CO  Ac
 
 IL  00      -1  -2      .0          ST  08  MM  ti
 
 OS  00      3-  6-      00          SI  12  UN  ve
 
 EC  45      20  20                  DE      IT    
 
    50      09  09                         Y     
 
 OT  2                               PH      AL    
 
 C                                   AR      LE    
 
 20                                  MA      RG    
 
 .6                                  CY      Y     
 
                                          &     
 
 MG                         OF      AS    
 
                           CY      TH 
 
 TA                      NT   MA 
 
 BL                      HI     
 
 ET               AN   PS 
 
                A    C  
 
                        
 
                        
 
                     
 
                     
 
                     
 
                     
 
                     
 
                     
 
                
 
     00      01  02  00  30  30      EA  11  CO  Ac
 
     00      -1  -2      .0          ST  08  MM  ti
 
     60      3-  6-      00          SI  14  UN  ve
 
     11      20  20                  DE      IT    
 
     73      09  09                         Y     
 
     1                               PH      AL    
 
                                     AR      LE    
 
                                     MA      RG    
 
                                     CY      Y     
 
                                           &     
 
                            OF      AS    
 
                            CY      TH 
 
                         NT   MA 
 
                         HI     
 
                  AN   PS 
 
                A    C  
 
                        
 
                        
 
                  
 
                  
 
                  
 
                  
 
                  
 
                  
 
                
 
 [place]  37      01  01  00  30  30      EA  11  CO  Ac
 
 IL  00      -1  -3      .0          ST  08  MM  ti
 
 OS  00      3-  0-      00          SI  12  UN  ve
 
 EC  45      20  20                  DE      IT    
 
    50      09  09                         Y     
 
 OT  2                               PH      AL    
 
 C                                   AR      LE    
 
 20                                  MA      RG    
 
 .6                                  CY      Y     
 
                                          &     
 
 MG                         OF      AS    
 
                           CY      TH 
 
 TA                      NT   MA 
 
 BL                      HI     
 
 ET               AN   PS 
 
                A    C  
 
                        
 
                        
 
                     
 
                     
 
                     
 
                     
 
                     
 
                     
 
                
 
 NA  00      01  01  00  17  30      EA  11  CO  Ac
 
 SO  08      -1  -3      .0          ST  08  MM  ti
 
 NE  51      3-  0-      00          SI  11  UN  ve
 
 X   28      20  20                  DE      IT    
 
 50  80      09  09                         Y     
 
    1                               PH      AL    
 
 MC                                  AR      LE    
 
 G                                   MA      RG    
 
 NA                                  CY      Y     
 
 SA                                        &     
 
 L                          OF      AS    
 
 SP                         CY      TH 
 
 RA                      NT   MA 
 
 Y                       HI     
 
                  AN   PS 
 
                A    C  
 
                        
 
                        
 
                     
 
                     
 
                     
 
                     
 
                    
 
                  
 
                
 
 [place]  00      01  01  00  6.  5       EA  11  ST  Ac
 
 IT  09      -2  -3      00          ST  18  EP  ti
 
 HR  37      1-  0-      0           SI  16  HE  ve
 
 OM  14      20  20                  DE      NS    
 
 YC  61      09  09                              
 
 IN  8                               PH      DO    
 
                                    AR      N     
 
 25                                  MA      R     
 
 0                                   CY            
 
 MG                                             
 
                           OF            
 
 TA                        CY         
 
 BL                      NT      
 
 ET                      HI      
 
                  AN      
 
                A       
 
                       
 
                     
 
                  
 
                  
 
                  
 
                  
 
                  
 
               
 
              
 
     00      07  01  05  30  30      EA  98  CO  Ac
 
     00      -0  -0      .0          ST  63  MM  ti
 
     60      7-  1-      00          SI  68  UN  ve
 
     11      20  20                  DE      IT    
 
     73      08  09                         Y     
 
     1                               PH      AL    
 
                                     AR      LE    
 
                                     MA      RG    
 
                                     CY      Y     
 
                                           &     
 
                            OF      AS    
 
                            CY      TH 
 
                         NT   MA 
 
                         HI     
 
                  AN   PS 
 
                A    C  
 
                        
 
                        
 
                  
 
                  
 
                  
 
                  
 
                  
 
                  
 
                
 
 LO  60      10  01  02  30  30      EA  99  CO  Ac
 
 RA  50      -2  -0      .0          ST  94  MM  ti
 
 TA  50      1-  1-      00          SI  12  UN  ve
 
 DI  14      20  20                  DE      IT    
 
 NE  70      08  09                         Y     
 
    1                               PH      AL    
 
 10                                  AR      LE    
 
                                    MA      RG    
 
 MG                                  CY      Y     
 
                                          &     
 
 TA                         OF      AS    
 
 BL                         CY      TH 
 
 ET                      NT   MA 
 
                         HI     
 
                  AN   PS 
 
                A    C  
 
                        
 
                        
 
                     
 
                     
 
                     
 
                     
 
                  
 
                  
 
                
 
     49      07  01  05  60  30      EA  98  CO  Ac
 
     88      -0  -0      .0          ST  63  MM  ti
 
     40      7-  1-      00          SI  67  UN  ve
 
     54      20  20                  DE      IT    
 
     41      08  09                         Y     
 
     0                               PH      AL    
 
                                     AR      LE    
 
                                     MA      RG    
 
                                     CY      Y     
 
                                           &     
 
                            OF      AS    
 
                            CY      TH 
 
                         NT   MA 
 
                         HI     
 
                  AN   PS 
 
                A    C  
 
                        
 
                        
 
                  
 
                  
 
                  
 
                  
 
                  
 
                  
 
                
 
 OH  60      12  12  00  12  3       EA  10  ST  Ac
 
 OM  43      -0  -1      0.          ST  57  EP  ti
 
 ET  20      8-  8-      00          SI  97  HE  ve
 
 HA  60      20  20      0           DE      NS    
 
 ZI  80      08  08                              
 
 NE  4                               PH      DO    
 
                                    AR      N     
 
 6.                                  MA      R     
 
 25                                  CY            
 
                                               
 
 MG                         OF            
 
 /5                         CY         
 
                          NT      
 
 ML                      HI      
 
                 AN      
 
 SY             A       
 
 RP                    
 
                     
 
                  
 
                  
 
                  
 
                  
 
                  
 
                  
 
                 
 
               
 
     00      07  12  04  30  30      EA  98  CO  Ac
 
     00      -0  -0      .0          ST  63  MM  ti
 
     60      7-  4-      00          SI  68  UN  ve
 
     11      20  20                  DE      IT    
 
     73      08  08                         Y     
 
     1                               PH      AL    
 
                                     AR      LE    
 
                                     MA      RG    
 
                                     CY      Y     
 
                                           &     
 
                            OF      AS    
 
                            CY      TH 
 
                         NT   MA 
 
                         HI     
 
                  AN   PS 
 
                A    C  
 
                        
 
                        
 
                  
 
                  
 
                  
 
                  
 
                  
 
                  
 
                
 
     49      07  12  04  60  30      EA  98  CO  Ac
 
     88      -0  -0      .0          ST  63  MM  ti
 
     40      7-  4-      00          SI  67  UN  ve
 
     54      20  20                  DE      IT    
 
     40      08  08                         Y     
 
     2                               PH      AL    
 
                                     AR      LE    
 
                                     MA      RG    
 
                                     CY      Y     
 
                                           &     
 
                            OF      AS    
 
                            CY      TH 
 
                         NT   MA 
 
                         HI     
 
                  AN   PS 
 
                A    C  
 
                        
 
                        
 
                  
 
                  
 
                  
 
                  
 
                  
 
                  
 
                
 
 LO  60      10  12  01  30  30      EA  99  CO  Ac
 
 RA  50      -2  -0      .0          ST  94  MM  ti
 
 TA  50      1-  4-      00          SI  12  UN  ve
 
 DI  14      20  20                  DE      IT    
 
 NE  70      08  08                         Y     
 
    1                               PH      AL    
 
 10                                  AR      LE    
 
                                    MA      RG    
 
 MG                                  CY      Y     
 
                                          &     
 
 TA                         OF      AS    
 
 BL                         CY      TH 
 
 ET                      NT   MA 
 
                         HI     
 
                  AN   PS 
 
                A    C  
 
                        
 
                        
 
                     
 
                     
 
                     
 
                     
 
                  
 
                  
 
                
 
 RA  00      07  11  03  60  30      EA  98  CO  Ac
 
 NI  17      -0  -0      .0          ST  63  MM  ti
 
 TI  24      7-  7-      00          SI  67  UN  ve
 
 DI  35      20  20                  DE      IT    
 
 NE  77      08  08                         Y     
 
    0                               PH      AL    
 
 15                                  AR      LE    
 
 0                                   MA      RG    
 
 MG                                  CY      Y     
 
                                          &     
 
 TA                         OF      AS    
 
 BL                         CY      TH 
 
 ET                      NT   MA 
 
                         HI     
 
                  AN   PS 
 
                A    C  
 
                        
 
                        
 
                     
 
                     
 
                     
 
                     
 
                  
 
                  
 
                
 
     00      07  11  03  30  30      EA  98  CO  Ac
 
     00      -0  -0      .0          ST  63  MM  ti
 
     60      7-  7-      00          SI  68  UN  ve
 
     11      20  20                  DE      IT    
 
     73      08  08                         Y     
 
     1                               PH      AL    
 
                                     AR      LE    
 
                                     MA      RG    
 
                                     CY      Y     
 
                                           &     
 
                            OF      AS    
 
                            CY      TH 
 
                         NT   MA 
 
                         HI     
 
                  AN   PS 
 
                A    C  
 
                        
 
                        
 
                  
 
                  
 
                  
 
                  
 
                  
 
                  
 
                
 
 LO  60      10  11  00  30  30      EA  99  CO  Ac
 
 RA  50      -2  -0      .0          ST  94  MM  ti
 
 TA  50      1-  7-      00          SI  12  UN  ve
 
 DI  14      20  20                  DE      IT    
 
 NE  70      08  08                         Y     
 
    1                               PH      AL    
 
 10                                  AR      LE    
 
                                    MA      RG    
 
 MG                                  CY      Y     
 
                                          &     
 
 TA                         OF      AS    
 
 BL                         CY      TH 
 
 ET                      NT   MA 
 
                         HI     
 
                  AN   PS 
 
                A    C  
 
                        
 
                        
 
                     
 
                     
 
                     
 
                     
 
                  
 
                  
 
                
 
 AM  00      10  10  00  21  7       EA  99  No  Ac
 
 OX  78      -0  -2      .0          ST  73  t   ti
 
 IC  12      4-  3-      00          SI  66  Av  ve
 
 IL  02      20  20                  DE      ai    
 
 LI  00      08  08                         la    
 
 N   5                               PH      bl    
 
 25                                  AR      e     
 
 0                                   MA            
 
 MG                                  CY            
 
                                               
 
 CA                         OF            
 
 PS                         CY         
 
 UL                      NT      
 
 E                       HI      
 
                  AN      
 
                A      
 
                     
 
                     
 
                  
 
                  
 
                  
 
                  
 
                 
 
               
 
              
 
     00      07  10  02  30  30      EA  98  No  Ac
 
     00      -0  -0      .0          ST  63  t   ti
 
     60      7-  9-      00          SI  68  Av  ve
 
     11      20  20                  DE      ai    
 
     73      08  08                         la    
 
     1                               PH      bl    
 
                                     AR      e     
 
                                     MA            
 
                                     CY            
 
                                                 
 
                            OF            
 
                            CY         
 
                         NT      
 
                         HI      
 
                  AN      
 
                A      
 
                     
 
                     
 
               
 
               
 
               
 
               
 
               
 
               
 
              
 
 LO  60      09  10  00  30  30      EA  99  No  Ac
 
 RA  50      -2  -0      .0          ST  55  t   ti
 
 TA  50      2-  9-      00          SI  80  Av  ve
 
 DI  14      20  20                  DE      ai    
 
 NE  70      08  08                         la    
 
    1                               PH      bl    
 
 10                                  AR      e     
 
                                    MA            
 
 MG                                  CY            
 
                                                
 
 TA                         OF            
 
 BL                         CY         
 
 ET                      NT      
 
                         HI      
 
                  AN      
 
                A      
 
                     
 
                     
 
                  
 
                  
 
                  
 
                  
 
               
 
               
 
              
 
     49      07  10  02  60  30      EA  98  No  Ac
 
     88      -0  -0      .0          ST  63  t   ti
 
     40      7-  9-      00          SI  67  Av  ve
 
     54      20  20                  DE      ai    
 
     40      08  08                         la    
 
     2                               PH      bl    
 
                                     AR      e     
 
                                     MA            
 
                                     CY            
 
                                                 
 
                            OF            
 
                            CY         
 
                         NT      
 
                         HI      
 
                  AN      
 
                A      
 
                     
 
                     
 
               
 
               
 
               
 
               
 
               
 
               
 
              
 
     00      07  08  01  30  30      EA  98  No  Ac
 
     00      -0  -2      .0          ST  63  t   ti
 
     60      7-  8-      00          SI  68  Av  ve
 
     11      20  20                  DE      ai    
 
     73      08  08                         la    
 
     1                               PH      bl    
 
                                     AR      e     
 
                                     MA            
 
                                     CY            
 
                                                 
 
                            OF            
 
                            CY         
 
                         NT      
 
                         HI      
 
                  AN      
 
                A      
 
                     
 
                     
 
               
 
               
 
               
 
               
 
               
 
               
 
              
 
 LO  60      01  08  06  30  30      EA  96  No  Ac
 
 RA  50      -2  -2      .0          ST  44  t   ti
 
 TA  50      1-  8-      00          SI  65  Av  ve
 
 DI  14      20  20                  DE      ai    
 
 NE  70      08  08                         la    
 
    1                               PH      bl    
 
 10                                  AR      e     
 
                                    MA            
 
 MG                                  CY            
 
                                                
 
 TA                         OF            
 
 BL                         CY         
 
 ET                      NT      
 
                         HI      
 
                  AN      
 
                A      
 
                     
 
                     
 
                  
 
                  
 
                  
 
                  
 
               
 
               
 
              
 
     49      07  08  01  60  30      EA  98  No  Ac
 
     88      -0  -2      .0          ST  63  t   ti
 
     40      7-  8-      00          SI  67  Av  ve
 
     54      20  20                  DE      ai    
 
     40      08  08                         la    
 
     2                               PH      bl    
 
                                     AR      e     
 
                                     MA            
 
                                     CY            
 
                                                 
 
                            OF            
 
                            CY         
 
                         NT      
 
                         HI      
 
                  AN      
 
                A      
 
                     
 
                     
 
               
 
               
 
               
 
               
 
               
 
               
 
              
 
 LO  60      01  08  05  30  30      EA  96  No  Ac
 
 RA  50      -2  -0      .0          ST  44  t   ti
 
 TA  50      1-  1-      00          SI  65  Av  ve
 
 DI  14      20  20                  DE      ai    
 
 NE  70      08  08                         la    
 
    1                               PH      bl    
 
 10                                  AR      e     
 
                                    MA            
 
 MG                                  CY            
 
                                                
 
 TA                         OF            
 
 BL                         CY         
 
 ET                      NT      
 
                         HI      
 
                  AN      
 
                A      
 
                     
 
                     
 
                  
 
                  
 
                  
 
                  
 
               
 
               
 
              
 
 SE  45      01  08  01  12  4       EA  96  No  Ac
 
 LE  80      -0  -0      0.          ST  28  t   ti
 
 NI  20      9-  1-      00          SI  45  Av  ve
 
 UM  04      20  20      0           DE      ai    
 
    06      08  08                         la    
 
 [place]  4                               PH      bl    
 
 LF                                  AR      e     
 
 ID                                  MA            
 
 E                                   CY            
 
 2.                                             
 
 5%                         OF            
 
                           CY         
 
 LO                        NT      
 
 TI                      HI      
 
 ON               AN      
 
                A      
 
                     
 
                     
 
                  
 
                  
 
                  
 
                  
 
                  
 
                  
 
              
 
     00      07  07  00  30  30      EA  98  No  Ac
 
     00      -0  -1      .0          ST  63  t   ti
 
     60      7-  7-      00          SI  68  Av  ve
 
     11      20  20                  DE      ai    
 
     73      08  08                         la    
 
     1                               PH      bl    
 
                                     AR      e     
 
                                     MA            
 
                                     CY            
 
                                                 
 
                            OF            
 
                            CY         
 
                         NT      
 
                         HI      
 
                  AN      
 
                A      
 
                     
 
                     
 
               
 
               
 
               
 
               
 
               
 
               
 
              
 
 NA  00      07  07  00  17  17      EA  98  No  Ac
 
 SO  08      -0  -1      .0          ST  63  t   ti
 
 NE  51      7-  7-      00          SI  66  Av  ve
 
 X   28      20  20                  DE      ai    
 
 50  80      08  08                         la    
 
    1                               PH      bl    
 
 MC                                  AR      e     
 
 G                                   MA            
 
 NA                                  CY            
 
 SA                                              
 
 L                          OF            
 
 SP                         CY         
 
 RA                      NT      
 
 Y                       HI      
 
                  AN      
 
                A      
 
                     
 
                     
 
                  
 
                  
 
                  
 
                  
 
                 
 
               
 
              
 
     49      07  07  00  60  30      EA  98  No  Ac
 
     88      -0  -1      .0          ST  63  t   ti
 
     40      7-  7-      00          SI  67  Av  ve
 
     54      20  20                  DE      ai    
 
     40      08  08                         la    
 
     2                               PH      bl    
 
                                     AR      e     
 
                                     MA            
 
                                     CY            
 
                                                 
 
                            OF            
 
                            CY         
 
                         NT      
 
                         HI      
 
                  AN      
 
                A      
 
                     
 
                     
 
               
 
               
 
               
 
               
 
               
 
               
 
              
 
 VE  00      07  07  00  18  18      EA  98  No  Ac
 
 NT  17      -0  -1      .0          ST  63  t   ti
 
 OL  30      7-  7-      00          SI  69  Av  ve
 
 IN  68      20  20                  DE      ai    
 
    22      08  08                         la    
 
 HF  0                               PH      bl    
 
 A                                   AR      e     
 
 90                                  MA            
 
                                    CY            
 
 MC                                              
 
 G                          OF            
 
 IN                         CY         
 
 [place]                      NT      
 
 LE                      HI      
 
 R                AN      
 
                A      
 
                     
 
                     
 
                  
 
                  
 
                  
 
                  
 
                  
 
                 
 
              
 
 NA  00      01  07  02  17  17      EA  96  No  Ac
 
 SO  08      -2  -0      .0          ST  44  t   ti
 
 NE  51      1-  3-      00          SI  61  Av  ve
 
 X   28      20  20                  DE      ai    
 
 50  80      08  08                         la    
 
    1                               PH      bl    
 
 MC                                  AR      e     
 
 G                                   MA            
 
 NA                                  CY            
 
 SA                                              
 
 L                          OF            
 
 SP                         CY         
 
 RA                      NT      
 
 Y                       HI      
 
                  AN      
 
                A      
 
                     
 
                     
 
                  
 
                  
 
                  
 
                  
 
                 
 
               
 
              
 
 LO  60      01  06  04  30  30      EA  96  No  Ac
 
 RA  50      -2  -1      .0          ST  44  t   ti
 
 TA  50      1-  2-      00          SI  65  Av  ve
 
 DI  14      20  20                  DE      ai    
 
 NE  70      08  08                         la    
 
    1                               PH      bl    
 
 10                                  AR      e     
 
                                    MA            
 
 MG                                  CY            
 
                                                
 
 TA                         OF            
 
 BL                         CY         
 
 ET                      NT      
 
                         HI      
 
                  AN      
 
                A      
 
                     
 
                     
 
                  
 
                  
 
                  
 
                  
 
               
 
               
 
              
 
     00      01  06  04  30  30      EA  96  No  Ac
 
     00      -2  -1      .0          ST  44  t   ti
 
     60      1-  2-      00          SI  63  Av  ve
 
     11      20  20                  DE      ai    
 
     73      08  08                         la    
 
     1                               PH      bl    
 
                                     AR      e     
 
                                     MA            
 
                                     CY            
 
                                                 
 
                            OF            
 
                            CY         
 
                         NT      
 
                         HI      
 
                  AN      
 
                A      
 
                     
 
                     
 
               
 
               
 
               
 
               
 
               
 
               
 
              
 
     49      01  06  04  60  30      EA  96  No  Ac
 
     88      -2  -1      .0          ST  44  t   ti
 
     40      1-  2-      00          SI  64  Av  ve
 
     54      20  20                  DE      ai    
 
     40      08  08                         la    
 
     2                               PH      bl    
 
                                     AR      e     
 
                                     MA            
 
                                     CY            
 
                                                 
 
                            OF            
 
                            CY         
 
                         NT      
 
                         HI      
 
                  AN      
 
                A      
 
                     
 
                     
 
               
 
               
 
               
 
               
 
               
 
               
 
              
 
     49      01  05  03  60  30      EA  96  No  Ac
 
     88      -2  -2      .0          ST  44  t   ti
 
     40      1-  2-      00          SI  64  Av  ve
 
     54      20  20                  DE      ai    
 
     40      08  08                         la    
 
     2                               PH      bl    
 
                                     AR      e     
 
                                     MA            
 
                                     CY            
 
                                                 
 
                            OF            
 
                            CY         
 
                         NT      
 
                         HI      
 
                  AN      
 
                A      
 
                     
 
                     
 
               
 
               
 
               
 
               
 
               
 
               
 
              
 
 LO  60      01  05  03  30  30      EA  96  No  Ac
 
 RA  50      -2  -2      .0          ST  44  t   ti
 
 TA  50      1-  2-      00          SI  65  Av  ve
 
 DI  14      20  20                  DE      ai    
 
 NE  70      08  08                         la    
 
    1                               PH      bl    
 
 10                                  AR      e     
 
                                    MA            
 
 MG                                  CY            
 
                                                
 
 TA                         OF            
 
 BL                         CY         
 
 ET                      NT      
 
                         HI      
 
                  AN      
 
                A      
 
                     
 
                     
 
                  
 
                  
 
                  
 
                  
 
               
 
               
 
              
 
     00      01  05  03  30  30      EA  96  No  Ac
 
     00      -2  -2      .0          ST  44  t   ti
 
     60      1-  2-      00          SI  63  Av  ve
 
     11      20  20                  DE      ai    
 
     73      08  08                         la    
 
     1                               PH      bl    
 
                                     AR      e     
 
                                     MA            
 
                                     CY            
 
                                                 
 
                            OF            
 
                            CY         
 
                         NT      
 
                         HI      
 
                  AN      
 
                A      
 
                     
 
                     
 
               
 
               
 
               
 
               
 
               
 
               
 
              
 
     49      01  04  02  60  30      EA  96  No  Ac
 
     88      -2  -1      .0          ST  44  t   ti
 
     40      1-  0-      00          SI  64  Av  ve
 
     54      20  20                  DE      ai    
 
     40      08  08                         la    
 
     2                               PH      bl    
 
                                     AR      e     
 
                                     MA            
 
                                     CY            
 
                                                 
 
                            OF            
 
                            CY         
 
                         NT      
 
                         HI      
 
                  AN      
 
                A      
 
                     
 
                     
 
               
 
               
 
               
 
               
 
               
 
               
 
              
 
 LO  60      01  04  02  30  30      EA  96  No  Ac
 
 RA  50      -2  -1      .0          ST  44  t   ti
 
 TA  50      1-  0-      00          SI  65  Av  ve
 
 DI  14      20  20                  DE      ai    
 
 NE  70      08  08                         la    
 
    1                               PH      bl    
 
 10                                  AR      e     
 
                                    MA            
 
 MG                                  CY            
 
                                                
 
 TA                         OF            
 
 BL                         CY         
 
 ET                      NT      
 
                         HI      
 
                  AN      
 
                A      
 
                     
 
                     
 
                  
 
                  
 
                  
 
                  
 
               
 
               
 
              
 
     00      01  04  02  30  30      EA  96  No  Ac
 
     00      -2  -1      .0          ST  44  t   ti
 
     60      1-  0-      00          SI  63  Av  ve
 
     11      20  20                  DE      ai    
 
     73      08  08                         la    
 
     1                               PH      bl    
 
                                     AR      e     
 
                                     MA            
 
                                     CY            
 
                                                 
 
                            OF            
 
                            CY         
 
                         NT      
 
                         HI      
 
                  AN      
 
                A      
 
                     
 
                     
 
               
 
               
 
               
 
               
 
               
 
               
 
              
 
     00      01  04  01  30  30      EA  96  No  Ac
 
     00      -2  -0      .0          ST  44  t   ti
 
     60      1-  7-      00          SI  63  Av  ve
 
     11      20  20                  DE      ai    
 
     73      08  08                         la    
 
     1                               PH      bl    
 
                                     AR      e     
 
                                     MA            
 
                                     CY            
 
                                                 
 
                            OF            
 
                            CY         
 
                         NT      
 
                         HI      
 
                  AN      
 
                A      
 
                     
 
                     
 
               
 
               
 
               
 
               
 
               
 
               
 
              
 
 NA  00      01  04  01  17  17      EA  96  No  Ac
 
 SO  08      -2  -0      .0          ST  44  t   ti
 
 NE  51      1-  7-      00          SI  61  Av  ve
 
 X   28      20  20                  DE      ai    
 
 50  80      08  08                         la    
 
    1                               PH      bl    
 
 MC                                  AR      e     
 
 G                                   MA            
 
 NA                                  CY            
 
 SA                                              
 
 L                          OF            
 
 SP                         CY         
 
 RA                      NT      
 
 Y                       HI      
 
                  AN      
 
                A      
 
                     
 
                     
 
                  
 
                  
 
                  
 
                  
 
                 
 
               
 
              
 
 LO  60      01  04  01  30  30      EA  96  No  Ac
 
 RA  50      -2  -0      .0          ST  44  t   ti
 
 TA  50      1-  7-      00          SI  65  Av  ve
 
 DI  14      20  20                  DE      ai    
 
 NE  70      08  08                         la    
 
    1                               PH      bl    
 
 10                                  AR      e     
 
                                    MA            
 
 MG                                  CY            
 
                                                
 
 TA                         OF            
 
 BL                         CY         
 
 ET                      NT      
 
                         HI      
 
                  AN      
 
                A      
 
                     
 
                     
 
                  
 
                  
 
                  
 
                  
 
               
 
               
 
              
 
     49      01  04  01  60  30      EA  96  No  Ac
 
     88      -2  -0      .0          ST  44  t   ti
 
     40      1-  7-      00          SI  64  Av  ve
 
     54      20  20                  DE      ai    
 
     40      08  08                         la    
 
     2                               PH      bl    
 
                                     AR      e     
 
                                     MA            
 
                                     CY            
 
                                                 
 
                            OF            
 
                            CY         
 
                         NT      
 
                         HI      
 
                  AN      
 
                A      
 
                     
 
                     
 
               
 
               
 
               
 
               
 
               
 
               
 
              
 
 LO  60      01  03  00  30  30      EA  96  No  Ac
 
 RA  50      -2  -2      .0          ST  44  t   ti
 
 TA  50      1-  6-      00          SI  65  Av  ve
 
 DI  14      20  20                  DE      ai    
 
 NE  70      08  08                         la    
 
    1                               PH      bl    
 
 10                                  AR      e     
 
                                    MA            
 
 MG                                  CY            
 
                                                
 
 TA                         OF            
 
 BL                         CY         
 
 ET                      NT      
 
                         HI      
 
                  AN      
 
                A      
 
                     
 
                     
 
                  
 
                  
 
                  
 
                  
 
               
 
               
 
              
 
     49      01  03  00  60  30      EA  96  No  Ac
 
     88      -2  -2      .0          ST  44  t   ti
 
     40      1  6      00          SI  64  Av  ve
 
     54      20  20                  DE      ai    
 
     40      08  08                         la    
 
     2                               PH      bl    
 
                                     AR      e     
 
                                     MA            
 
                                     CY            
 
                                                 
 
                            OF            
 
                            CY         
 
                         NT      
 
                         HI      
 
                  AN      
 
                A      
 
                     
 
                     
 
               
 
               
 
               
 
               
 
               
 
               
 
              
 
 AM  00      01  03  00  30  10      EA  96  No  Ac
 
 OX  78      -3  -2      .0          ST  58  t   ti
 
 IC  12        6          SI  70  Av  ve
 
 IL  61      20  20                  DE      ai    
 
 LI  30      08  08                         la    
 
 N   5                               PH      bl    
 
 50                                  AR      e     
 
 0                                   MA            
 
 MG                                  CY            
 
                                                
 
 CA                         OF            
 
 PS                         CY         
 
 UL                      NT      
 
 E                       HI      
 
                  AN      
 
                A      
 
                     
 
                     
 
                  
 
                  
 
                  
 
                  
 
                 
 
               
 
              
 
     00      01  03  00  30  30      EA  96  No  Ac
 
     00      -2  -2      .0          ST  44  t   ti
 
     60      1  6      00          SI  63  Av  ve
 
     11      20  20                  DE      ai    
 
     73      08  08                         la    
 
     1                               PH      bl    
 
                                     AR      e     
 
                                     MA            
 
                                     CY            
 
                                                 
 
                            OF            
 
                            CY         
 
                         NT      
 
                         HI      
 
                  AN      
 
                A      
 
                     
 
                     
 
               
 
               
 
               
 
               
 
               
 
               
 
              
 
 NA  00      01  03  00  17  17      EA  96  No  Ac
 
 SO  08      -2  -2      .0          ST  44  t   ti
 
 NE  51                SI  61  Av  ve
 
 X   28      20  20                  DE      ai    
 
 50  80      08  08                         la    
 
    1                               PH      bl    
 
 MC                                  AR      e     
 
 G                                   MA            
 
 NA                                  CY            
 
 SA                                              
 
 L                          OF            
 
 SP                         CY         
 
 RA                      NT      
 
 Y                       HI      
 
                  AN      
 
                A      
 
                     
 
                     
 
                  
 
                  
 
                  
 
                  
 
                 
 
               
 
              
 
 VE  00        03  00  18  18      EA  96  No  Ac
 
 NT  17      -2  -2      .0          ST  44  t   ti
 
 OL  30                SI  62  Av  ve
 
 IN  68      20  20                  DE      ai    
 
    22      08  08                         la    
 
 HF  0                               PH      bl    
 
 A                                   AR      e     
 
 90                                  MA            
 
                                    CY            
 
 MC                                              
 
 G                          OF            
 
 IN                         CY         
 
 FRANCIS                      NT      
 
 LE                      HI      
 
 R                AN      
 
                A      
 
                     
 
                     
 
                  
 
                  
 
                  
 
                  
 
                  
 
                 
 
              
 
 SE  45      01  03  00  12  4       EA  96  No  Ac
 
 LE  80      -0  -2      0.          ST  28  t   ti
 
 NI  20      9  4-      00          SI  45  Av  ve
 
 UM  04      20  20      0           DE      ai    
 
    06      08  08                         la    
 
 [place]  4                               PH      bl    
 
 LF                                  AR      e     
 
 ID                                  MA            
 
 E                                   CY            
 
 2.                                             
 
 5%                         OF            
 
                           CY         
 
 LO                        NT      
 
 TI                      HI      
 
 ON               AN      
 
                A      
 
                     
 
                     
 
                  
 
                  
 
                  
 
                  
 
                  
 
                  
 
              
 
                                                                                
                                                                                
                                                                                
                                                                                
                                                                                
                                                                                
                                                                                
                                                                                
                                                                                
                                                                                
                                                                                
                                                                                
                                                                                
                                                                                
                                                                                
                                                                         
 
Results
                      
 
 
 Labs                
 
 
 
 
 Lab   Lab   Date    Result  Refere  Interp  Status  Commen
 
 Order   Detail                  nces   retati          t     
 
                                 Range   on                    
 
                                                            
 
                                  
 
                
 
 
 
 
 Hemoglobin A1c measurement (10- 14:15)
 
                 
 
 
 
 
          Hemoglo  10-12-2  5.2 %    0.0-7.0           complet
 
          bin A1c  017                              ed     
 
                 14:15                                      
 
                                           
 
                           
 
           
 
 
 
 
                      Comment:               < 6%   NON-DIABETIC 
 
         LEVEL                
 
                      Comment:               < 7%   CONTROLLED 
 
         DIABETIC LEVEL       
 
            
 
                      Comment:               > 8%   POORLY 
 
         CONTROLLED DIABETIC 
 
   LEVEL                
 
 
 
 
 Serum or plasma thyroid stimulating horm (10- 14:15)
 
                 
 
 
 
 
          Serum   10-12-2  = 2.03   0.358-3           complet
 
          or   017   uIU/ml   .740              ed     
 
        plasma   14:15                                      
 
  thyroid                                          
 
                              
 
  stimula          
 
  ting      
 
  horm        
 
              
 
              
 
        
 
 
 
 
 Lipid profile (10- 14:15)
 
                 
 
 
 
 
          Serum   10-12-2  = 11.8   0-40              complet
 
          or   017                              ed     
 
        plasma   14:15                                      
 
  cholest                                
 
  ilda in                     
 
   VLDL           
 
  brandan        
 
              
 
              
 
        
 
          Serum   10-12-2  = 59               complet
 
          or   017   mg/dL                      ed     
 
        plasma   14:15                                      
 
  triglyc                                      
 
  eride                         
 
  measure          
 
  ment        
 
              
 
              
 
        
 
          Serum   10-12-2  = 78.2   0-130             complet
 
          or   017   mg/dL                      ed     
 
        plasma   14:15                                      
 
  cholest                                     
 
  ilda in                        
 
   LDL           
 
  measu       
 
              
 
              
 
         
 
          Serum   10-12-2  = 46.0   40-60             complet
 
          or   017   MG/DL                      ed     
 
        plasma   14:15                                      
 
  cholest                                     
 
  ilda in                        
 
   HDL           
 
  measu       
 
              
 
              
 
         
 
          Total   10-12-2  = 136   < 200             complet
 
          cholest  017   mg/dL                      ed     
 
        ilda   14:15                                      
 
  measure                                     
 
  ment                           
 
                   
 
              
 
        
 
 
 
 
 Serum or plasma free thyroxine (T4) brandan (10- 14:15)
 
                 
 
 
 
 
          Serum   10-12-2  = 1.17   0.76-1.           complet
 
          or   017   ng/dL    46                ed     
 
        plasma   14:15                                      
 
  free                                          
 
  thyroxi                        
 
  ne (T4)          
 
   brandan       
 
              
 
              
 
         
 
 
 
 
 Comprehensive metabolic panel (10- 14:15)
 
                 
 
 
 
 
          Protein  10-12-2  = 7.6   6.4-8.2           complet
 
           total   017   gm/dL                      ed     
 
        ser/gabrielle  14:15                                      
 
  s                                             
 
                                 
 
                 
 
          ALT   10-12-2  = 17   12-78             complet
 
          (SGPT)   017   U/L                        ed     
 
        ser/gabrielle  14:15                                      
 
  s                                           
 
                              
 
                 
 
          Serum   10-12-2  = 13   15-37             complet
 
          or   017   U/L                        ed     
 
        plasma   14:15                                      
 
  asparta                                     
 
  te                      
 
  aminotr          
 
  ansfera     
 
              
 
              
 
           
 
          Serum   10-12-2  = 142   136-145           complet
 
          sodium   017   mmoL/L                     ed     
 
        measure  14:15                                      
 
  ment                                          
 
                                  
 
                   
 
        
 
          Serum   10-12-2  = 4.0   3.5-5.1           complet
 
          potassi  017   mmoL/L                     ed     
 
        um   14:15                                      
 
  measure                                       
 
  ment                            
 
                   
 
              
 
        
 
          Serum   10-12-2  = 96               complet
 
          or   017   mg/dL                      ed     
 
        plasma   14:15                                      
 
  glucose                                      
 
                           
 
  measure          
 
  ment      
 
  (mas        
 
              
 
              
 
        
 
          Serum   10-12-2  = 3.2   1.3-3.2           complet
 
          globuli  017   gm/dL                      ed     
 
        n   14:15                                      
 
  measure                                       
 
  ment                         
 
  (mass/v          
 
  olume)      
 
              
 
              
 
          
 
          Estimat  10-12-2  = 91   59-               complet
 
          ed   017   ML/MIN                     ed     
 
        glomeru  14:15                                      
 
  lar                                   
 
  filtrat                         
 
  ion           
 
  rate      
 
  (GF         
 
              
 
              
 
 
 
 
                      Comment:               REFERENCE RANGE: >60 
 
         ML/MIN/1.73 SQUARE 
 
   METERS               
 
    
 
                      Comment:               If this patient is 
 
         -American, 
 
   then multiply the    
 
               
 
                      Comment:               result by 1.210.     
 
                    
 
 
 
 
          Serum   10-12-2  = 0.8   0.55-1.           complet
 
          or   017   mg/dL    02                ed     
 
        plasma   14:15                                      
 
  creatin                                         
 
  ine                         
 
  measure          
 
  ment (      
 
              
 
              
 
          
 
          Carbon   10-12-2  = 30   21.0-32           complet
 
          dioxide  017   mmoL/L   .0                ed     
 
           14:15                                      
 
  measure                                         
 
  ment                            
 
                   
 
              
 
        
 
          Serum   10-12-2  = 105               complet
 
          or   017   mmoL/L                     ed     
 
        plasma   14:15                                      
 
  chlorid                                      
 
  e                          
 
  measure          
 
  ment      
 
  (mo         
 
              
 
              
 
          Serum   10-12-2  = 9.5   8.5-10.           complet
 
          or   017   mg/dL    1                 ed     
 
        plasma   14:15                                      
 
  calcium                                        
 
                           
 
  measure          
 
  ment      
 
  (mas        
 
              
 
              
 
        
 
          Serum   10-12-2  = 15   7-18              complet
 
          or   017   mg/dL                      ed     
 
        plasma   14:15                                      
 
  urea                                     
 
  nitroge                        
 
  n           
 
  measure     
 
  men         
 
              
 
              
 
          Serum   10-12-2  = 0.2   0.2-1.0           complet
 
          or   017   mg/dL                      ed     
 
        plasma   14:15                                      
 
  total                                        
 
  bilirub                        
 
  in           
 
  measure     
 
  m           
 
              
 
            
 
          Serum   10-12-2  = 65               complet
 
          or   017   U/L                        ed     
 
        plasma   14:15                                      
 
  alkalin                                      
 
  e                      
 
  phospha          
 
  tase      
 
  yajaira         
 
              
 
              
 
          Serum   10-12-2  = 4.4   3.4-5.0           complet
 
          or   017   gm/dL                      ed     
 
        plasma   14:15                                      
 
  albumin                                       
 
                           
 
  measure          
 
  ment      
 
  (mas        
 
              
 
              
 
        
 
          Serum   10-12-2  = 1.4    1.1-1.8           complet
 
          or   017                              ed     
 
        plasma   14:15                                      
 
  albumin                                  
 
  /globul                     
 
  in mass          
 
   ra         
 
              
 
              
 
 
 
 
 CBC w auto diff (10- 14:15)
 
                 
 
 
 
 
          Automat  10-12-2  = 7.1   7.4-10.           complet
 
          ed   017   fl       4                 ed     
 
        blood   14:15                                      
 
  platele                                       
 
  t mean                      
 
  volume           
 
  yajaira         
 
              
 
              
 
          Mono %   10-12-2  = 6.9 %  1.7-9.3           complet
 
                   017                              ed     
 
                 14:15                                      
 
                                         
 
                      
 
           
 
          Absolut  10-12-2  = 0.4   0.1-1.0           complet
 
          e   017   K/mm3                      ed     
 
        monocyt  14:15                                      
 
  e count                                       
 
                                 
 
                   
 
           
 
          Automat  10-12-2  = 88.2   82.2-97           complet
 
          ed   017   fl       .8                ed     
 
        erythro  14:15                                      
 
  cyte                                         
 
  mean                      
 
  corpusc          
 
  ular v      
 
              
 
              
 
          
 
          Automat  10-12-2  = 34.5   31.8-35           complet
 
          ed   017   g/dl     .4                ed     
 
        erythro  14:15                                      
 
  cyte                                          
 
  mean                        
 
  corpusc          
 
  ular h      
 
              
 
              
 
          
 
          Mean   10-12-2  = 30.4   27-31.2           complet
 
          corpusc  017   pg                         ed     
 
        ular   14:15                                      
 
  hemoglo                                      
 
  bin                      
 
  ([facility])           
 
  determ      
 
              
 
              
 
          
 
          Lymphoc  10-12-2  = 29.4   10-50.0           complet
 
          yte   017   %                          ed     
 
        count,   14:15                                      
 
  blood,                                      
 
  automat                     
 
  ed               
 
              
 
             
 
          Absolut  10-12-2  = 1.9   0.7-4.5           complet
 
          e   017   K/mm3                      ed     
 
        lymphoc  14:15                                      
 
  yte                                        
 
  count                          
 
                   
 
              
 
         
 
          Blood   10-12-2  = 14.4   12.2-16           complet
 
          hemoglo  017   g/dL     .2                ed     
 
        bin   14:15                                      
 
  measure                                         
 
  ment                        
 
  (mass/v          
 
  olum        
 
              
 
              
 
        
 
          Blood   10-12-2  = 41.8   37.0-47           complet
 
          hematoc  017   %        .0                ed     
 
        rit   14:15                                      
 
  (volume                                       
 
                        
 
  fractio          
 
  n)          
 
              
 
             
 
          Granulo  10-12-2  = 62.3   37.0-80           complet
 
          cyte   017   %        .0                ed     
 
        percent  14:15                                      
 
  age                                           
 
                              
 
                   
 
          Blood   10-12-2  = 3.9   1.8-7.8           complet
 
          granulo  017   K/mm3                      ed     
 
        cytes   14:15                                      
 
  automat                                       
 
  ed                         
 
  count           
 
  (numb       
 
              
 
              
 
         
 
          Automat  10-12-2  = 0.8 %  0.1-12.           complet
 
          ed   017            0                 ed     
 
        blood   14:15                                      
 
  eosinop                                     
 
  hils/10                     
 
  0           
 
  leukocy     
 
  t           
 
              
 
            
 
          Automat  10-12-2  = 0.1   0.0-0.4           complet
 
          ed   017   K/mm3                      ed     
 
        blood   14:15                                      
 
  eosinop                                       
 
  hil                         
 
  count            
 
              
 
              
 
         
 
          Baso %   10-12-2  = 0.6 %  0.1-2.0           complet
 
                   017                              ed     
 
                 14:15                                      
 
                                         
 
                      
 
           
 
          Blood   10-12-2  = 6.3   4.8-10.           complet
 
          leukocy  017   K/MM3    8                 ed     
 
        janis   14:15                                      
 
  count                                         
 
  (number                        
 
  /volume          
 
  )           
 
              
 
            
 
          Automat  10-12-2  = 12.2   11.5-17           complet
 
          ed   017   %        .5                ed     
 
        erythro  14:15                                      
 
  cyte                                        
 
  distrib                     
 
  ution           
 
  width       
 
              
 
              
 
         
 
          Red   10-12-2  = 4.74   4.2-5.4           complet
 
          blood   017   M/mm3                      ed     
 
        cell   14:15                                      
 
  count                                         
 
                                 
 
                   
 
         
 
          Blood   10-12-2  = 401   142-424           complet
 
          platele  017   K/mm3                      ed     
 
        t count  14:15                                      
 
                                                
 
                                 
 
                
 
          Automat  10-12-2  = 0.0   0-0.2             complet
 
          ed   017   K/MM3                      ed     
 
        blood   14:15                                      
 
  basophi                                     
 
  l count                        
 
             
 
  (count/     
 
  vo          
 
              
 
             
 
 
 
 
 Serum or plasma 25-hydroxyvitamin D brandan (10- 14:15)
 
                 
 
 
 
 
          Serum   10-12-2  = 6.9   30.0-10           complet
 
          or   017   ng/mL    0.0               ed     
 
        plasma   14:15                                      
 
  25-hydr                                          
 
  oxyvita                        
 
  min D           
 
  brandan        
 
              
 
              
 
        
 
 
 
 
                      Comment:               Vitamin D deficiency 
 
         has been defined by 
 
   the [place] of     
 
              
 
                      Comment:               Medicine and an 
 
         Endocrine Society 
 
   practice guideline as
 
    a                
 
                      Comment:               level of serum 25-OH 
 
         vitamin D less than 
 
   20 ng/mL (1,2).      
 
             
 
                      Comment:               The Endocrine Society
 
          went on to further 
 
   define vitamin D     
 
              
 
                      Comment:               insufficiency as a 
 
         level between 21 and 
 
   29 ng/mL (2).        
 
           
 
                      Comment:               1. IOM ([place] of 
 
         Medicine). 2010. 
 
   Dietary reference    
 
               
 
                      Comment:                  intakes for 
 
         calcium and D. 
 
   [place]: The   
 
                
 
                      Comment:                  [employer]
 
          [employer].              
 
     
 
                      Comment:               2. Lo MF, Dahlia BOURNE, Enoc
 
    FRANCIS, et al.          
 
         
 
                      Comment:                  Evaluation, 
 
         treatment, and 
 
   prevention of vitamin
 
    D                
 
                      Comment:                  deficiency: an 
 
         Endocrine Society 
 
   clinical practice    
 
               
 
                      Comment:                  guideline. JCEM. 
 
         2011; 
 
   96(7):1911-30.       
 
            
 
                      Comment:               Performed at:  [facility] 
 
         [facility]       
 
            
 
                      Comment:               [address], [place]  [phone]
 
                   
 
                      Comment:               : Joseph Dowell PhD, 
 
   Phone:  [phone]   
 
                
 
 
 
 
 Hemoglobin A1c in Blood (10- 14:15)
 
                 
 
 
 
 
          Hemoglo  10-12-2  5.2 %    0.0%   Normal   complet
 
          bin A1c  017            -            ed     
 
         in   14:15             7.0%                   
 
  Blood                                   
 
                              
 
                     
 
         
 
 
 
 
 Streptococcus pyogenes Ag [Presence] in Unspecified specimen 
 
 (2017 02:04)                
 
 
 
 
          Strepto    NEGATIV                    complet
 
          coccus   017   E                          ed     
 
        pyogene  02:04                                      
 
  s Ag                                         
 
  [Presen                             
 
  ce] in             
 
  Unspeci     
 
  fied      
 
  specime     
 
  n           
 
              
 
            
 
 
 
 
 CHLAMYDIA AND GONORRHEA TESTING (2013 11:35)
 
                 
 
 
 
 
          [place]    NEGATIV                    complet
 
          ia   013   E                          ed     
 
        trachom  11:35                                      
 
  atis                                         
 
  rRNA                              
 
  [Presen            
 
  ce] in      
 
  Unspeci     
 
  fied      
 
  specime     
 
  n by      
 
  Probe &     
 
   target     
 
        
 
  amplifi     
 
  cation      
 
  method      
 
              
 
              
 
          
 
          Neisser    NEGATIV                    complet
 
          ia   013   E                          ed     
 
        gonorrh  11:35                                      
 
  oeae                                         
 
  rRNA                              
 
  [Presen            
 
  ce] in      
 
  Unspeci     
 
  fied      
 
  specime     
 
  n by      
 
  Probe &     
 
   target     
 
        
 
  amplifi     
 
  cation      
 
  method      
 
              
 
              
 
          
 
 
 
 
 CHLAMYDIA AND GONORRHEA TESTING (2013 11:35)
 
                 
 
 
 
 
          REMARKS    MOD REP                    complet
 
                   013    TO                     ed     
 
                 11:35    CHANGE                            
 
                                          
 
            FROM                    
 
       96        
 
    TO    
 
    96   
 
     PER    
 
    ARNAV [place] [place] HD   
 
        
 
       
 
    3 TN      
 
              
 
              
 
        
 
 
 
 
 CHLAMYDIA AND GONORRHEA TESTING (2013 11:35)
 
                 
 
 
 
 
          COLLECT    PT/D.                     complet
 
          OR       013   BRADFOR                    ed     
 
                 11:35    D RN                              
 
                                                  
 
                                      
 
               
 
          ETHNICI    WHITE,                     complet
 
          TY       013   NON-HIS                    ed     
 
                 11:35    PANIC                             
 
                                                  
 
                                      
 
                
 
          KIT                       complet
 
          EXPIRAT  013   014                        ed     
 
        ION   11:35                                      
 
  DATE                                             
 
                                      
 
                     
 
        
 
          SYMPTOM    NO                         complet
 
          S        013                              ed     
 
                 11:35                                     
 
                                         
 
                              
 
             
 
          REASON     REVISIT                    complet
 
          FOR   013   /ANNUAL                    ed     
 
        REQUEST  11:35     FAMILY                           
 
                                             
 
                    PLANNIN                   
 
            G VISIT       
 
              
 
              
 
           
 
          SPECIME    URINE                      complet
 
          N   013                              ed     
 
        SOURCE   11:35                                      
 
                                              
 
                                      
 
                 
 
          PREGNAN    NO                         complet
 
          T        013                              ed     
 
                 11:35                                     
 
                                         
 
                              
 
             
 
          CHART     NA                         complet
 
          NUMBER   013                              ed     
 
                 11:35                                     
 
                                           
 
                                  
 
             
 
          [place]    Pending                    complet
 
          ia   013                              ed     
 
        trachom  11:35                                      
 
  atis                                        
 
  rRNA                              
 
  [Presen            
 
  ce] in      
 
  Unspeci     
 
  fied      
 
  specime     
 
  n by      
 
  Probe &     
 
   target     
 
        
 
  amplifi     
 
  cation      
 
  method      
 
              
 
              
 
          
 
          Neisser    Pending                    complet
 
          ia   013                              ed     
 
        gonorrh  11:35                                      
 
  oeae                                        
 
  rRNA                              
 
  [Presen            
 
  ce] in      
 
  Unspeci     
 
  fied      
 
  specime     
 
  n by      
 
  Probe &     
 
   target     
 
        
 
  amplifi     
 
  cation      
 
  method      
 
              
 
              
 
          
 
 
 
 
 CHLAMYDIA AND GONORRHEA TESTING (10- 16:30)
 
                 
 
 
 
 
          [place]yd  10-30-2  NEGATIV                    complet
 
          ia   012   E                          ed     
 
        trachom  16:30                                      
 
  atis                                         
 
  rRNA                              
 
  [Presen            
 
  ce] in      
 
  Unspeci     
 
  fied      
 
  specime     
 
  n by      
 
  Probe &     
 
   target     
 
        
 
  amplifi     
 
  cation      
 
  method      
 
              
 
              
 
          
 
          Neisser  10-30-2  NEGATIV                    complet
 
          ia   012   E                          ed     
 
        gonorrh  16:30                                      
 
  oeae                                         
 
  rRNA                              
 
  [Presen            
 
  ce] in      
 
  Unspeci     
 
  fied      
 
  specime     
 
  n by      
 
  Probe &     
 
   target     
 
        
 
  amplifi     
 
  cation      
 
  method      
 
              
 
              
 
          
 
 
 
 
 CHLAMYDIA AND GONORRHEA TESTING (10- 16:30)
 
                 
 
 
 
 
          COLLECT  10-30-2  NA                         complet
 
          OR       012                              ed     
 
                 16:30                                     
 
                                          
 
                              
 
             
 
          ETHNICI  10-30-2  WHITE,                     complet
 
          TY       012   NON-HIS                    ed     
 
                 16:30    PANIC                             
 
                                                  
 
                                      
 
                
 
          KIT   10-30-2  1-31-13                    complet
 
          EXPIRAT  012                              ed     
 
        ION   16:30                                      
 
  DATE                                          
 
                                      
 
                     
 
        
 
          SYMPTOM  10-30-2  NO                         complet
 
          S        012                              ed     
 
                 16:30                                     
 
                                         
 
                              
 
             
 
          REASON   10-30-2  REVISIT                    complet
 
          FOR      /ANNUAL                    ed     
 
        REQUEST  16:30     FAMILY                           
 
                                             
 
                    PLANNIN                   
 
            G VISIT       
 
              
 
              
 
           
 
          SPECIME  10-30-2  URINE                      complet
 
          N   012                              ed     
 
        SOURCE   16:30                                      
 
                                              
 
                                      
 
                 
 
          PREGNAN  10-30-2  NO                         complet
 
          T        012                              ed     
 
                 16:30                                     
 
                                         
 
                              
 
             
 
          CHART   10-30-2  NA                         complet
 
          NUMBER   012                              ed     
 
                 16:30                                     
 
                                           
 
                                  
 
             
 
          [place]  10-30-2  Pending                    complet
 
          ia   012                              ed     
 
        trachom  16:30                                      
 
  atis                                        
 
  rRNA                              
 
  [Presen            
 
  ce] in      
 
  Unspeci     
 
  fied      
 
  specime     
 
  n by      
 
  Probe &     
 
   target     
 
        
 
  amplifi     
 
  cation      
 
  method      
 
              
 
              
 
          
 
          Neisser  10-30-2  Pending                    complet
 
          ia   012                              ed     
 
        gonorrh  16:30                                      
 
  oeae                                        
 
  rRNA                              
 
  [Presen            
 
  ce] in      
 
  Unspeci     
 
  fied      
 
  specime     
 
  n by      
 
  Probe &     
 
   target     
 
        
 
  amplifi     
 
  cation      
 
  method      
 
              
 
              
 
          
 
 
 
 
 CHLAMYDIA AND GONORRHEA TESTING (10- 11:23)
 
                 
 
 
 
 
          COLLECT  10-04-2  NA                         complet
 
          OR       011                              ed     
 
                 11:23                                     
 
                                          
 
                              
 
             
 
          ETHNICI  10-04-2  WHITE,                     complet
 
          TY       011   NON-HIS                    ed     
 
                 11:23    PANIC                             
 
                                                  
 
                                      
 
                
 
          KIT   10-04-2  11-30-1                    complet
 
          EXPIRAT  011   1                          ed     
 
        ION   11:23                                      
 
  DATE                                           
 
                                      
 
                     
 
        
 
          SYMPTOM  10-04-2  NO                         complet
 
          S        011                              ed     
 
                 11:23                                     
 
                                         
 
                              
 
             
 
          REASON   10-04-2  REVISIT                    complet
 
          FOR   011   /ANNUAL                    ed     
 
        REQUEST  11:23     FAMILY                           
 
                                             
 
                    PLANNIN                   
 
            G VISIT       
 
              
 
              
 
           
 
          SPECIME  10-04-2  URINE                      complet
 
          N   011                              ed     
 
        SOURCE   11:23                                      
 
                                              
 
                                      
 
                 
 
          PREGNAN  10-04-2  NO                         complet
 
          T        011                              ed     
 
                 11:23                                     
 
                                         
 
                              
 
             
 
          CHART   10-04-2  NA                         complet
 
          NUMBER   011                              ed     
 
                 11:23                                     
 
                                           
 
                                  
 
             
 
          [place]  10-04-2  NEGATIV                    complet
 
          ia   011   E                          ed     
 
        trachom  11:23                                      
 
  atis                                         
 
  rRNA                              
 
  [Presen            
 
  ce] in      
 
  Unspeci     
 
  fied      
 
  specime     
 
  n by      
 
  Probe &     
 
   target     
 
        
 
  amplifi     
 
  cation      
 
  method      
 
              
 
              
 
          
 
          Neisser  10-04-2  NEGATIV                    complet
 
          ia   011   E                          ed     
 
        gonorrh  11:23                                      
 
  oeae                                         
 
  rRNA                              
 
  [Presen            
 
  ce] in      
 
  Unspeci     
 
  fied      
 
  specime     
 
  n by      
 
  Probe &     
 
   target     
 
        
 
  amplifi     
 
  cation      
 
  method      
 
              
 
              
 
          
 
                                                                                
                                                                                
                                                                                
                                                                                
                                                                                
                                                                                
                                                                                
                                                                                
                                                                                
                                                                                
                                                                                
                           
 
Encounters
                      
 
 
 Encounter  Start   End Date   Code       Location   Performer
 
  Type      Date                                                 
 
                                                        
 
                
 
 [facility]                [place]            
 
 -   7          7                     [facility]            
 
 [facility]                [place]            
 
 [facility]   7          7                     [facility]            
 
 [facility]                [place]            
 
 [facility]   7          7                     [facility]            
 
 [facility]                [place]            
 
 [facility]   7          7                     [facility]            
 
 [facility]                [place]            
 
 [facility]   7          7                     [facility]            
 
 [facility]                [place]            
 
 -   5          5                     [facility]            
 
 [facility]                [place]            
 
 -   5          5                     [facility]            
 
 [facility]                [place]            
 
 -   4          4                     [facility]                [place]            
 
 -   2          2                     [facility]                [place]            
 
 -   2          2                     [facility]                [place]            
 
 -   1          1                     [facility]                [place]            
 
 -   9          9                     [facility]                [place]            
 
 -   8          8                     [facility]

## 2017-11-19 NOTE — EXTERNAL MEDICAL SUMMARY RPT
[facility] Patient Summary
 Created on: 2017
 
JAMMIE SIMMONS
External Reference #: [id number]
: 89
Sex: Female
 
Demographics
 
 
 
 Address  [address]  [address]
 
 Home Phone  [phone]
 
 Preferred Language  Unknown
 
 Marital Status  Unknown
 
 [employer] Affiliation  Unknown
 
 Race  Unknown
 
 Ethnic Group  Unknown
 
 
Author
 
 
 
 Author  FLORIN [place], [employer] 
 
 Organization  [employer]
 
 Address  Unknown
 
 Phone  Unavailable
 
 
 
Results
 
 
 
 25-Hydroxyvitamin D [Mass/volume] in Serum or Plasma
 
 
 
 
 Observa  Value  Referen  Units  Interpr  Notes  Date
 
 tion   ce    etation  
 
   Range    
 
 
 
 
 25-Hydrox  30.0 -   ng/mL  Low  Vitamin D  Oct 12 
 
 yvitamin   100.0        2:15
 
 D      deficienc   PM
 
 [Mass/vol     y has  
 
 ume] in      been  
 
 Serum or      defined  
 
 Plasma     by the  
 
     [facility] 
 
       
 
     [facility] 
 
     e and an  
 
     Endocrine 
 
      Society  
 
     practice  
 
     guideline 
 
      as  
 
     alevel of 
 
      serum  
 
     25-OH  
 
     vitamin D 
 
      less  
 
     than 20  
 
     ng/mL  
 
     (1,2).The 
 
       
 
     Endocrine 
 
      Society  
 
     went on  
 
     to  
 
     further  
 
     define  
 
     vitamin  
 
     Dinsuffic 
 
     iency as  
 
     a level  
 
     between  
 
     21 and 29 
 
      ng/mL  
 
     (2).1.  
 
     [facility]  
 
     (Institut 
 
     e of  
 
     Medicine) 
 
     . 2010.  
 
     Dietary  
 
     reference 
 
     intakes  
 
     for  
 
     calcium  
 
     and D.  
 
     [place] 
 
     n DC:  
 
     [employer] 
 
     al  
 
     [employer] 
 
      Press.2. 
 
      Lo  
 
     MF,  
 
     Dahlia  
 
     [place],  
 
     [place] Epps  
 
     FRANCIS, et  
 
     al.Evalua 
 
     tion,  
 
     treatment 
 
     , and  
 
     preventio 
 
     n of  
 
     vitamin  
 
     Ddeficien 
 
     cy: an  
 
     Endocrine 
 
      Society  
 
     clinical  
 
     practiceg 
 
     uideline. 
 
      [facility].  
 
     2011; 
 
       
 
     96(7):191 
 
     1-30.Perf 
 
     ormed at: 
 
        -  
 
     [facility]  
 
     [id number] [place] [address]   
 
     [id number] 
 
     Lab  
 
     Director: 
 
      Joseph Dowell 
 
      PhD,  
 
     Phone:   
 
     [phone] 
 
     0 
 
 
 
 
 Hemoglobin A1c in Blood
 
 
 
 
 Observa  Value  Referen  Units  Interpr  Notes  Date
 
 tion   ce    etation  
 
   Range    
 
 Hemoglo  5.2  0.0 -   %  Normal  < 6%     Oct 12 
 
 bin A1c   7.0    NON-NIKI  2017 
 
  in       BETIC   2:15 PM
 
 Blood      LEVEL<  
 
      7%    
 
      CONTROL 
 
      LED  
 
      DIABETI 
 
      C  
 
      LEVEL>  
 
      8%    
 
      POORLY  
 
      CONTROL 
 
      LED  
 
      DIABETI 
 
      C LEVEL 
 
 
 
 
 Comprehensive metabolic 2000 panel in Serum or Plasma
 
 
 
 
 Observa  Value  Referen  Units  Interpr  Notes  Date
 
 tion   ce    etation  
 
   Range    
 
 
 
 
 Albumin/G  1.1 - 1.8  No   Normal  No   Oct 12 
 
 lobulin    informati   informati  2017 2:15
 
 [Mass    on in    on in    PM
 
 ratio] in   source    source  
 
  Serum or   data   data 
 
  Plasma     
 
 Albumin   3.4 - 5.0  gm/dL  Normal  No   Oct 12 
 
 [Mass/vol     informati  2017 2:15
 
 ume] in      on in    PM
 
 Serum or      source  
 
 Plasma     data 
 
 Alkaline   46 - 116  U/L  Normal  No   Oct 12 
 
 phosphata     informati  2017 2:15
 
 se      on in    PM
 
 [Enzymati     source  
 
 c      data 
 
 activity/     
 
 volume]      
 
 in Serum      
 
 or Plasma     
 
 Bilirubin  0.2 - 1.0  mg/dL  Normal  No   Oct 12 
 
 .total      informati  2017 2:15
 
 [Mass/vol     on in    PM
 
 ume] in      source  
 
 Serum or      data 
 
 Plasma     
 
 Urea   7 - 18  mg/dL  Normal  No   Oct 12 
 
 nitrogen      informati   2:15
 
 [Mass/vol     on in    PM
 
 ume] in      source  
 
 Serum or      data 
 
 Plasma     
 
 Calcium   8.5 -   mg/dL  Normal  No   Oct 12 
 
 [Mass/vol  10.1    informati   2:15
 
 ume] in      on in    PM
 
 Serum or      source  
 
 Plasma     data 
 
 Chloride   98 - 107  mmoL/L  Normal  No   Oct 12 
 
 [Moles/vo     informati   2:15
 
 lume] in      on in    PM
 
 Serum or      source  
 
 Plasma     data 
 
 Carbon   21.0 -   mmoL/L  Normal  No   Oct 12 
 
 dioxide,   32.0    informati  2017 2:15
 
 total      on in    PM
 
 [Moles/vo     source  
 
 lume] in      data 
 
 Serum or      
 
 Plasma     
 
 Creatinin  0.55 -   mg/dL  Normal  No   Oct 12 
 
 e   1.02    informati  2017 2:15
 
 [Mass/vol     on in    PM
 
 ume] in      source  
 
 Serum or      data 
 
 Plasma     
 
 Estimated  59-  ML/MIN  No   REFERENCE  Oct 12 
 
      informati   RANGE:   2017 2:15
 
 glomerula    on in   >60    PM
 
 r     source   ML/MIN/1. 
 
 filtratio    data  73 SQUARE 
 
 n rate       METERSIf 
 
 (GF      this  
 
     patient  
 
     is  
 
     -A 
 
     merican,  
 
     then  
 
     multiply  
 
     theresult 
 
      by  
 
     1.210. 
 
 Globulin   1.3 - 3.2  gm/dL  Normal  No   Oct 12 
 
 [Mass/vol     informati  2017 2:15
 
 ume] in      on in    PM
 
 Serum     source  
 
     data 
 
 Glucose   74 - 106  mg/dL  Normal  No   Oct 12 
 
 [Mass/vol     informati  2017 2:15
 
 ume] in      on in    PM
 
 Serum or      source  
 
 Plasma     data 
 
 Potassium  3.5 - 5.1  mmoL/L  Normal  No   Oct 12 
 
       informati  2017 2:15
 
 [Moles/vo     on in    PM
 
 lume] in      source  
 
 Serum or      data 
 
 Plasma     
 
 Sodium   136 - 145  mmoL/L  Normal  No   Oct 12 
 
 [Moles/vo     informati  2017 2:15
 
 lume] in      on in    PM
 
 Serum or      source  
 
 Plasma     data 
 
 Aspartate  15 - 37  U/L  Low  No   Oct 12 
 
       inform2017 2:15
 
 aminotran     on in    PM
 
 sferase      source  
 
 [Enzymati     data 
 
 c      
 
 activity/     
 
 volume]      
 
 in Serum      
 
 or Plasma     
 
 Alanine   12 - 78  U/L  Normal  No   Oct 12 
 
 aminotran     2017 2:15
 
 sferase      on in    PM
 
 [Enzymati     source  
 
 c      data 
 
 activity/     
 
 volume]      
 
 in Serum      
 
 or Plasma     
 
 Protein   6.4 - 8.2  gm/dL  Normal  No   Oct 12 
 
 [Mass/vol     informati   2:15
 
 ume] in      on in    PM
 
 Serum or      source  
 
 Plasma     data 
 
 
 
 
 Thyroxine (T4) free [Mass/volume] in Serum or Plasma
 
 
 
 
 Observa  Value  Referen  Units  Interpr  Notes  Date
 
 tion   ce    etation  
 
   Range    
 
 
 
 
 Thyroxine  0.76 -   ng/dL  Normal  No   Oct 12 
 
  (T4)   1.46    2017 2:15
 
 free      on in    PM
 
 [Mass/vol     source  
 
 ume] in      data 
 
 Serum or      
 
 Plasma     
 
 
 
 
 Lipid 1996 panel in Serum or Plasma
 
 
 
 
 Observa  Value  Referen  Units  Interpr  Notes  Date
 
 tion   ce    etation  
 
   Range    
 
 
 
 
 Cholester  < 200  mg/dL  No   No   Oct 12 
 
 ol     informati  inform2017 2:15
 
 [Moles/vo    on in   on in    PM
 
 lume] in     source   source  
 
 Unspecifi    data  data 
 
 ed      
 
 specimen     
 
 Cholester  40 - 60  MG/DL  Normal  No   Oct 12 
 
 ol in HDL     2017 2:15
 
       on in    PM
 
 [Mass/vol     source  
 
 ume] in      data 
 
 Serum or      
 
 Plasma     
 
 Cholester  0 - 130  mg/dL  Normal  No   Oct 12 
 
 ol in LDL     2017 2:15
 
       on in    PM
 
 [Mass/vol     source  
 
 ume] in      data 
 
 Serum or      
 
 Plasma by     
 
       
 
 doug     
 
 on     
 
 Triglycer  30 - 200  mg/dL  Normal  No   Oct 12 
 
 silvina      2017 2:15
 
 [Moles/vo     on in    PM
 
 lume] in      source  
 
 Serum or      data 
 
 Plasma     
 
 Cholester  0 - 40  No   Normal  No   Oct 12 
 
 ol in    informati   informati   2:15
 
 VLDL    on in    on in    PM
 
 [Mass/vol   source    source  
 
 ume] in    data   data 
 
 Serum or      
 
 Plasma     
 
 
 
 
 Thyrotropin [Units/volume] in Serum or Plasma
 
 
 
 
 Observa  Value  Referen  Units  Interpr  Notes  Date
 
 tion   ce    etation  
 
   Range    
 
 
 
 
 Thyrotrop  0.358 -   uIU/ml  Normal  No   Oct 12 
 
 in   3.740    informati   2:15
 
 [Units/vo     on in    PM
 
 lume] in      source  
 
 Serum or      data 
 
 Plasma     
 
 
 
 
 CBC W Auto Differential panel in Blood
 
 
 
 
 Observa  Value  Referen  Units  Interpr  Notes  Date
 
 tion   ce    etation  
 
   Range    
 
 
 
 
 Basophils  0 - 0.2  K/MM3  Normal  No   Oct 12 
 
       informati   2:15
 
 [#/volume     on in    PM
 
 ] in      source  
 
 Blood by      data 
 
 Automated     
 
  count     
 
 Basophils  0.1 - 2.0  %  Normal  No   Oct 12 
 
 /      informati   2:15
 
 leukocyte     on in    PM
 
 s in      source  
 
 Blood by      data 
 
 Automated     
 
  count     
 
 Eosinophi  0.0 - 0.4  K/mm3  Normal  No   Oct 12 
 
 ls      informati   2:15
 
 [#/volume     on in    PM
 
 ] in      source  
 
 Blood by      data 
 
 Automated     
 
  count     
 
 Eosinophi  0.1 -   %  Normal  No   Oct 12 
 
 ls/100   12.0    informati   2:15
 
 leukocyte     on in    PM
 
 s in      source  
 
 Blood by      data 
 
 Automated     
 
  count     
 
 Granulocy  1.8 - 7.8  K/mm3  Normal  No   Oct 12 
 
 janis      informati   2:15
 
 [#/volume     on in    PM
 
 ] in      source  
 
 Blood by      data 
 
 Automated     
 
  count     
 
 Granulocy  37.0 -   %  Normal  No   Oct 12 
 
 janis/100   80.0    informati   2:15
 
 leukocyte     on in    PM
 
 s in      source  
 
 Blood by      data 
 
 Automated     
 
  count     
 
 Hematocri  37.0 -   %  Normal  No   Oct 12 
 
 t [Volume  47.0    informati   2:15
 
       on in    PM
 
 Fraction]     source  
 
  of Blood     data 
 
 Hemoglobi  12.2 -   g/dL  Normal  No   Oct 12 
 
 n   16.2    informati   2:15
 
 [Mass/vol     on in    PM
 
 ume] in      source  
 
 Blood     data 
 
 Lymphocyt  0.7 - 4.5  K/mm3  Normal  No   Oct 12 
 
 es      informati   2:15
 
 [#/volume     on in    PM
 
 ] in      source  
 
 Unspecifi     data 
 
 ed      
 
 specimen      
 
 by      
 
 Automated     
 
  count     
 
 Lymphocyt  10 - 50.0  %  Normal  No   Oct 12 
 
 es      informati   2:15
 
 [#/volume     on in    PM
 
 ] in      source  
 
 Unspecifi     data 
 
 ed      
 
 specimen      
 
 by      
 
 Automated     
 
  count     
 
 Erythrocy  27 - 31.2  pg  Normal  No   Oct 12 
 
 te mean      informati   2:15
 
 corpuscul     on in    PM
 
 ar      source  
 
 hemoglobi     data 
 
 n      
 
 [Entitic      
 
 mass]     
 
 Erythrocy  31.8 -   g/dl  Normal  No   Oct 12 
 
 te mean   35.4    informati   2:15
 
 corpuscul     on in    PM
 
 ar      source  
 
 hemoglobi     data 
 
 n      
 
 concentra     
 
 tion      
 
 [Mass/vol     
 
 ume] by      
 
 Automated     
 
  count     
 
 Erythrocy  82.2 -   fl  Normal  No   Oct 12 
 
 te mean   97.8    informati   2:15
 
 corpuscul     on in    PM
 
 ar volume     source  
 
  [Entitic     data 
 
  volume]      
 
 by      
 
 Automated     
 
  count     
 
 Monocytes  0.1 - 1.0  K/mm3  Normal  No   Oct 12 
 
       inform2017 2:15
 
 [#/volume     on in    PM
 
 ] in      source  
 
 Blood by      data 
 
 Automated     
 
  count     
 
 Monocytes  1.7 - 9.3  %  Normal  No   Oct 12 
 
 /100      informati   2:15
 
 leukocyte     on in    PM
 
 s in      source  
 
 Blood by      data 
 
 Automated     
 
  count     
 
 Platelet   7.4 -   fl  Low  No   Oct 12 
 
 mean   10.4    informati   2:15
 
 volume      on in    PM
 
 [Entitic      source  
 
 volume]      data 
 
 in Blood      
 
 by      
 
 Automated     
 
  count     
 
 Platelets  142 - 424  K/mm3  Normal  No   Oct 12 
 
       inform2017 2:15
 
 [#/volume     on in    PM
 
 ] in      source  
 
 Blood     data 
 
 Erythrocy  4.2 - 5.4  M/mm3  Normal  No   Oct 12 
 
 janis      inform2017 2:15
 
 [#/volume     on in    PM
 
 ] in      source  
 
 Amniotic      data 
 
 fluid     
 
 Erythrocy  11.5 -   %  Normal  No   Oct 12 
 
 te   17.5    informati   2:15
 
 distribut     on in    PM
 
 ion width     source  
 
  [Entitic     data 
 
  volume]      
 
 by      
 
 Automated     
 
  count     
 
 Leukocyte  4.8 -   K/MM3  Normal  No   Oct 12 
 
 s   10.8    informati   2:15
 
 [#/volume     on in    PM
 
 ] in      source  
 
 Blood     data 
 
 
 
 
 Lactate [Moles/volume] in Blood
 
 
 
 
 Observa  Value  Referen  Units  Interpr  Notes  Date
 
 tion   ce    etation  
 
   Range    
 
 
 
 
 Lactate   0.4 - 2.0  mmol/L  Normal  No   Aug 3 
 
 [Moles/vo     ati   2:04
 
 lume] in      on in    AM
 
 Blood     source  
 
     data 
 
 
 
 
 Comprehensive metabolic 2000 panel in Serum or Plasma
 
 
 
 
 Observa  Value  Referen  Units  Interpr  Notes  Date
 
 tion   ce    etation  
 
   Range    
 
 
 
 
 Albumin/G  1.1 - 1.8  No   Low  No   Aug 3 
 
 lobulin    informati   informati   2:04
 
 [Mass    on in    on in    AM
 
 ratio] in   source    source  
 
  Serum or   data   data 
 
  Plasma     
 
 Albumin   3.4 - 5.0  gm/dL  Normal  No   Aug 3 
 
 [Mass/vol     informati   2:04
 
 ume] in      on in    AM
 
 Serum or      source  
 
 Plasma     data 
 
 Alkaline   46 - 116  U/L  Normal  No   Aug 3 
 
 phosphata     informati   2:04
 
 se      on in    AM
 
 [Enzymati     source  
 
 c      data 
 
 activity/     
 
 volume]      
 
 in Serum      
 
 or Plasma     
 
 Bilirubin  0.2 - 1.0  mg/dL  Normal  No   Aug 3 
 
 .total      informati   2:04
 
 [Mass/vol     on in    AM
 
 ume] in      source  
 
 Serum or      data 
 
 Plasma     
 
 Urea   7 - 18  mg/dL  Low  No   Aug 3 
 
 nitrogen      informati   2:04
 
 [Mass/vol     on in    AM
 
 ume] in      source  
 
 Serum or      data 
 
 Plasma     
 
 Calcium   8.5 -   mg/dL  Normal  No   Aug 3 
 
 [Mass/vol  10.1    informati   2:04
 
 ume] in      on in    AM
 
 Serum or      source  
 
 Plasma     data 
 
 Chloride   98 - 107  mmoL/L  Normal  No   Aug 3 
 
 [Moles/vo     informati   2:04
 
 lume] in      on in    AM
 
 Serum or      source  
 
 Plasma     data 
 
 Carbon   21.0 -   mmoL/L  Normal  No   Aug 3 
 
 dioxide,   32.0    informati   2:04
 
 total      on in    AM
 
 [Moles/vo     source  
 
 lume] in      data 
 
 Serum or      
 
 Plasma     
 
 Creatinin  0.55 -   mg/dL  Normal  No   Aug 3 
 
 e   1.02    informati   2:04
 
 [Mass/vol     on in    AM
 
 ume] in      source  
 
 Serum or      data 
 
 Plasma     
 
 Creatinin  50 - 200  ML/MIN  Normal  No   Aug 3 
 
 e renal      informati   2:04
 
 clearance     on in    AM
 
       source  
 
 predicted     data 
 
  by      
 
 Cockcroft     
 
 -Gault      
 
 formula     
 
 Estimated  59-  ML/MIN  No   REFERENCE  Aug 3 
 
      informati   RANGE:   2017 2:04
 
 glomerula    on in   >60    AM
 
 r     source   ML/MIN/1. 
 
 filtratio    data  73 SQUARE 
 
 n rate       METERSIf 
 
 (GF      this  
 
     patient  
 
     is  
 
     -A 
 
     merican,  
 
     then  
 
     multiply  
 
     theresult 
 
      by  
 
     1.210. 
 
 Globulin   1.3 - 3.2  gm/dL  High  No   Aug 3 
 
 [Mass/vol     informati   2:04
 
 ume] in      on in    AM
 
 Serum     source  
 
     data 
 
 Glucose   74 - 106  mg/dL  Normal  No   Aug 3 
 
 [Mass/vol     informati   2:04
 
 ume] in      on in    AM
 
 Serum or      source  
 
 Plasma     data 
 
 Potassium  3.5 - 5.1  mmoL/L  Normal  No   Aug 3 
 
       informati   2:04
 
 [Moles/vo     on in    AM
 
 lume] in      source  
 
 Serum or      data 
 
 Plasma     
 
 Sodium   136 - 145  mmoL/L  Normal  No   Aug 3 
 
 [Moles/vo     informati   2:04
 
 lume] in      on in    AM
 
 Serum or      source  
 
 Plasma     data 
 
 Aspartate  15 - 37  U/L  Normal  No   Aug 3 
 
       informati   2:04
 
 aminotran     on in    AM
 
 sferase      source  
 
 [Enzymati     data 
 
 c      
 
 activity/     
 
 volume]      
 
 in Serum      
 
 or Plasma     
 
 Alanine   12 - 78  U/L  Normal  No   Aug 3 
 
 aminotran     informati   2:04
 
 sferase      on in    AM
 
 [Enzymati     source  
 
 c      data 
 
 activity/     
 
 volume]      
 
 in Serum      
 
 or Plasma     
 
 Protein   6.4 - 8.2  gm/dL  High  No   Aug 3 
 
 [Mass/vol     informati   2:04
 
 ume] in      on in    AM
 
 Serum or      source  
 
 Plasma     data 
 
 
 
 
 Streptococcus pyogenes Ag [Presence] in Unspecified specimen
 
 
 
 
 Observa  Value  Referen  Units  Interpr  Notes  Date
 
 tion   ce    etation  
 
   Range    
 
 Strepto  NEGATIV  No   No   No   No   Aug 3 
 
 coccus   E  informa  informa  informa  informa   
 
 pyogene   tion in  tion in  tion in  tion in  2:04 AM
 
 s Ag     source   source   source   source 
 
 [Presen    data   data   data   data 
 
 ce] in       
 
 Unspeci      
 
 fied       
 
 specime      
 
 n      
 
 
 
 
 CBC W Auto Differential panel in Blood
 
 
 
 
 Observa  Value  Referen  Units  Interpr  Notes  Date
 
 tion   ce    etation  
 
   Range    
 
 
 
 
 Basophils  0 - 0.2  K/MM3  Normal  No   Aug 3 
 
       informati   2:04
 
 [#/volume     on in    AM
 
 ] in      source  
 
 Blood by      data 
 
 Automated     
 
  count     
 
 Basophils  0.1 - 2.0  %  Normal  No   Aug 3 
 
 /100      informati   2:04
 
 leukocyte     on in    AM
 
 s in      source  
 
 Blood by      data 
 
 Automated     
 
  count     
 
 Eosinophi  0.0 - 0.4  K/mm3  Normal  No   Aug 3 
 
 ls      informati   2:04
 
 [#/volume     on in    AM
 
 ] in      source  
 
 Blood by      data 
 
 Automated     
 
  count     
 
 Eosinophi  0.1 -   %  Normal  No   Aug 3 
 
 ls/100   12.0    informati   2:04
 
 leukocyte     on in    AM
 
 s in      source  
 
 Blood by      data 
 
 Automated     
 
  count     
 
 Granulocy  1.8 - 7.8  K/mm3  Normal  No   Aug 3 
 
 janis      informati   2:04
 
 [#/volume     on in    AM
 
 ] in      source  
 
 Blood by      data 
 
 Automated     
 
  count     
 
 Granulocy  37.0 -   %  Normal  No   Aug 3 
 
 janis/100   80.0    informati   2:04
 
 leukocyte     on in    AM
 
 s in      source  
 
 Blood by      data 
 
 Automated     
 
  count     
 
 Hematocri  37.0 -   %  Normal  No   Aug 3 
 
 t [Volume  47.0    informati   2:04
 
       on in    AM
 
 Fraction]     source  
 
  of Blood     data 
 
 Hemoglobi  12.2 -   g/dL  Normal  No   Aug 3 
 
 n   16.2    informati   2:04
 
 [Mass/vol     on in    AM
 
 ume] in      source  
 
 Blood     data 
 
 Lymphocyt  0.7 - 4.5  K/mm3  Normal  No   Aug 3 
 
 es      informati   2:04
 
 [#/volume     on in    AM
 
 ] in      source  
 
 Unspecifi     data 
 
 ed      
 
 specimen      
 
 by      
 
 Automated     
 
  count     
 
 Lymphocyt  10 - 50.0  %  Normal  No   Aug 3 
 
 es      informati   2:04
 
 [#/volume     on in    AM
 
 ] in      source  
 
 Unspecifi     data 
 
 ed      
 
 specimen      
 
 by      
 
 Automated     
 
  count     
 
 Erythrocy  27 - 31.2  pg  Normal  No   Aug 3 
 
 te mean      inform2017 2:04
 
 corpuscul     on in    AM
 
 ar      source  
 
 hemoglobi     data 
 
 n      
 
 [Entitic      
 
 mass]     
 
 Erythrocy  31.8 -   g/dl  Normal  No   Aug 3 
 
 te mean   35.4    inform2017 2:04
 
 corpuscul     on in    AM
 
 ar      source  
 
 hemoglobi     data 
 
 n      
 
 concentra     
 
 tion      
 
 [Mass/vol     
 
 ume] by      
 
 Automated     
 
  count     
 
 Erythrocy  82.2 -   fl  Normal  No   Aug 3 
 
 te mean   97.8    informati   2:04
 
 corpuscul     on in    AM
 
 ar volume     source  
 
  [Entitic     data 
 
  volume]      
 
 by      
 
 Automated     
 
  count     
 
 Monocytes  0.1 - 1.0  K/mm3  Normal  No   Aug 3 
 
       informati   2:04
 
 [#/volume     on in    AM
 
 ] in      source  
 
 Blood by      data 
 
 Automated     
 
  count     
 
 Monocytes  1.7 - 9.3  %  Normal  No   Aug 3 
 
 /100      informati  2017 2:04
 
 leukocyte     on in    AM
 
 s in      source  
 
 Blood by      data 
 
 Automated     
 
  count     
 
 Platelet   7.4 -   fl  Low  No   Aug 3 
 
 mean   10.4    informati   2:04
 
 volume      on in    AM
 
 [Entitic      source  
 
 volume]      data 
 
 in Blood      
 
 by      
 
 Automated     
 
  count     
 
 Platelets  142 - 424  K/mm3  Normal  No   Aug 3 
 
       informati   2:04
 
 [#/volume     on in    AM
 
 ] in      source  
 
 Blood     data 
 
 Erythrocy  4.2 - 5.4  M/mm3  Normal  No   Aug 3 
 
 janis      informati  2017 2:04
 
 [#/volume     on in    AM
 
 ] in      source  
 
 Amniotic      data 
 
 fluid     
 
 Erythrocy  11.5 -   %  Normal  No   Aug 3 
 
 te   17.5    informati   2:04
 
 distribut     on in    AM
 
 ion width     source  
 
  [Entitic     data 
 
  volume]      
 
 by      
 
 Automated     
 
  count     
 
 Leukocyte  4.8 -   K/MM3  Normal  No   Aug 3 
 
 s   10.8    informati   2:04
 
 [#/volume     on in    AM
 
 ] in      source  
 
 Blood     data 
 
 
 
 
 CHLAMYDIA AND GONORRHEA TESTING
 
 
 
 
 Observa  Value  Referen  Units  Interpr  Notes  Date
 
 tion   ce    etation  
 
   Range    
 
 COLLECT  PT/D.   No   No   No   No    
 
 OR  BRADFOR  informa  informa  informa  informa   
 
  D RN  tion in  tion in  tion in  tion in  11:35 
 
    source   source   source   source  AM
 
    data   data   data   data 
 
 ETHNICI  WHITE,   No   No   No   No    
 
 TY  NON-HIS  informa  informa  informa  informa   
 
  PANIC  tion in  tion in  tion in  tion in  11:35 
 
    source   source   source   source  AM
 
    data   data   data   data 
 
 KIT   04-30-2  No   No   No   No    
 
 EXPIRAT  014  informa  informa  informa  informa  2013 
 
 ION    tion in  tion in  tion in  tion in  11:35 
 
 DATE    source   source   source   source  AM
 
    data   data   data   data 
 
 SYMPTOM  NO  No   No   No   No    
 
 S   informa  informa  informa  informa  2013 
 
   tion in  tion in  tion in  tion in  11:35 
 
    source   source   source   source  AM
 
    data   data   data   data 
 
 REASON   REVISIT  No   No   No   No    
 
 FOR   /ANNUAL  informa  informa  informa  informa  2013 
 
 REQUEST   FAMILY  tion in  tion in  tion in  tion in  11:35 
 
      source   source   source   source  AM
 
  PLANNIN   data   data   data   data 
 
  G VISIT     
 
 SPECIME  URINE  No   No   No   No    
 
 N    informa  informa  informa  informa  2013 
 
 SOURCE   tion in  tion in  tion in  tion in  11:35 
 
    source   source   source   source  AM
 
    data   data   data   data 
 
 PREGNAN  NO  No   No   No   No    
 
 T   informa  informa  informa  informa  2013 
 
   tion in  tion in  tion in  tion in  11:35 
 
    source   source   source   source  AM
 
    data   data   data   data 
 
 CHART   NA  No   No   No   No    
 
 NUMBER   informa  informa  informa  informa  2013 
 
   tion in  tion in  tion in  tion in  11:35 
 
    source   source   source   source  AM
 
    data   data   data   data 
 
 [place]  NEGATIV  No   No   No   NEGATIV   
 
 ia   E  informa  informa  informa  E   2013 
 
 trachom   tion in  tion in  tion in  RESULT=  11:35 
 
 atis     source   source   source   WITHIN  AM
 
 rRNA     data   data   data   NORMAL 
 
 [Presen        
 
 ce] in       LIMITSP 
 
 Unspeci      OSITIVE 
 
 fied         
 
 specime      RESULT= 
 
 n by         
 
 Probe &      ABNORMA 
 
  target      LEQUIVO 
 
        ECTOR  
 
 amplifi      RESULT= 
 
 cation         
 
 method      INDETER 
 
      MINATEU 
 
      NSATISF 
 
      ACTORY  
 
      RESULT= 
 
        
 
      INVALID 
 
 Neisser  NEGATIV  No   No   No   NEGATIV   
 
 ia   E  informa  informa  informa  E   2013 
 
 gonorrh   tion in  tion in  tion in  RESULT=  11:35 
 
 oeae     source   source   source   WITHIN  AM
 
 rRNA     data   data   data   NORMAL 
 
 [Presen        
 
 ce] in       LIMITSP 
 
 Unspeci      OSITIVE 
 
 fied         
 
 specime      RESULT= 
 
 n by         
 
 Probe &      ABNORMA 
 
  target      LEQUIVO 
 
        ECTOR  
 
 amplifi      RESULT= 
 
 cation         
 
 method      INDETER 
 
      MINATEU 
 
      NSATISF 
 
      ACTORY  
 
      RESULT= 
 
        
 
      INVALID 
 
      THE  
 
      APTIMA  
 
      COMBO 2 
 
       ASSAY  
 
      IS NOT  
 
      INTENDE 
 
      D FOR  
 
      THE  
 
      EVALUAT 
 
      ION OF  
 
      SUSPECT 
 
      EDSEXUA 
 
      L ABUSE 
 
       OR FOR 
 
       OTHER  
 
      MEDICO- 
 
      LEGAL  
 
      INDICAT 
 
      IONS.   
 
      FOR  
 
      THOSE  
 
      PATIENT 
 
      S  
 
      FORWHOM 
 
       A  
 
      FALSE  
 
      POSITIV 
 
      E  
 
      RESULT  
 
      MAY  
 
      HAVE  
 
      ADVERSE 
 
        
 
      PSYCHO- 
 
      SOCIAL  
 
      IMPACT, 
 
       THE  
 
      CDCRECO 
 
      MMENDS  
 
      RETESTI 
 
      NG. 
 
 REMARKS  MOD REP  No   No   No   \.br\Th   
 
   TO   informa  informa  informa  is   2013 
 
  CHANGE   tion in  tion in  tion in  report   11:35 
 
      source   source   source  contain  AM
 
  FROM    data   data   data  s  
 
  96      patient 
 
  TO        
 
  96     informa 
 
   PER      hannah REYNOSOO     that  
 
  N CO HD     must be 
 
          
 
       protect 
 
  3 TN     ed in  
 
      accorda 
 
      nce  
 
      with  
 
      the  
 
      Health  
 
      [facility] 
 
      ce  
 
      Portabi 
 
      lity  
 
      and  
 
      Account 
 
      ability 
 
       Act. 
 
 
 
 
 CHLAMYDIA AND GONORRHEA TESTING
 
 
 
 
 Observa  Value  Referen  Units  Interpr  Notes  Date
 
 tion   ce    etation  
 
   Range    
 
 COLLECT  PT/D.   No   No   No   No    
 
 OR  BRADFOR  informa  informa  informa  informa  2013 
 
  D RN  tion in  tion in  tion in  tion in  11:35 
 
    source   source   source   source  AM
 
    data   data   data   data 
 
 ETHNICI  WHITE,   No   No   No   No    
 
 TY  NON-HIS  informa  informa  informa  informa  2013 
 
  PANIC  tion in  tion in  tion in  tion in  11:35 
 
    source   source   source   source  AM
 
    data   data   data   data 
 
 KIT   -30-2  No   No   No   No    
 
 EXPIRAT  014  informa  informa  informa  informa  2013 
 
 ION    tion in  tion in  tion in  tion in  11:35 
 
 DATE    source   source   source   source  AM
 
    data   data   data   data 
 
 SYMPTOM  NO  No   No   No   No    
 
 S   informa  informa  informa  informa  2013 
 
   tion in  tion in  tion in  tion in  11:35 
 
    source   source   source   source  AM
 
    data   data   data   data 
 
 REASON   REVISIT  No   No   No   No    
 
 FOR   /ANNUAL  informa  informa  informa  informa  2013 
 
 REQUEST   FAMILY  tion in  tion in  tion in  tion in  11:35 
 
      source   source   source   source  AM
 
  PLANNIN   data   data   data   data 
 
  G VISIT     
 
 SPECIME  URINE  No   No   No   No    
 
 N    informa  informa  informa  informa  2013 
 
 SOURCE   tion in  tion in  tion in  tion in  11:35 
 
    source   source   source   source  AM
 
    data   data   data   data 
 
 PREGNAN  NO  No   No   No   No    
 
 T   informa  informa  informa  informa  2013 
 
   tion in  tion in  tion in  tion in  11:35 
 
    source   source   source   source  AM
 
    data   data   data   data 
 
 CHART   NA  No   No   No   No    
 
 NUMBER   informa  informa  informa  informa  2013 
 
   tion in  tion in  tion in  tion in  11:35 
 
    source   source   source   source  AM
 
    data   data   data   data 
 
 Chlamyd  Pending  No   No   No   No    
 
 ia    informa  informa  informa  informa  2013 
 
 trachom   tion in  tion in  tion in  tion in  11:35 
 
 atis     source   source   source   source  AM
 
 rRNA     data   data   data   data 
 
 [Presen      
 
 ce] in       
 
 Unspeci      
 
 fied       
 
 specime      
 
 n by       
 
 Probe &      
 
  target      
 
        
 
 amplifi      
 
 cation       
 
 method      
 
 Neisser  Pending  No   No   No   No    
 
 ia    informa  informa  informa  informa  2013 
 
 gonorrh   tion in  tion in  tion in  tion in  11:35 
 
 oeae     source   source   source   source  AM
 
 rRNA     data   data   data   data 
 
 [Presen      
 
 ce] in       
 
 Unspeci      
 
 fied       
 
 specime      
 
 n by       
 
 Probe &      
 
  target      
 
        
 
 amplifi      
 
 cation       
 
 method      
 
 REMARKS  MOD REP  No   No   No   \.br\ 
 
   TO   informa  informa  informa  is   2013 
 
  CHANGE   tion in  tion in  tion in  report   11:35 
 
      source   source   source  contain  AM
 
  FROM    data   data   data  s  
 
  96      patient 
 
  TO        
 
  96     informa 
 
   PER      tion  
 
  HARRISO     that  
 
  N CO HD     must be 
 
          
 
       protect 
 
  3 TN     ed in  
 
      [facility]  
 
      with  
 
      the  
 
      Health  
 
      [facility] 
 
      ce  
 
      Portabi 
 
      lity  
 
      and  
 
      Account 
 
      ability 
 
       Act. 
 
 
 
 
 CHLAMYDIA AND GONORRHEA TESTING
 
 
 
 
 Observa  Value  Referen  Units  Interpr  Notes  Date
 
 tion   ce    etation  
 
   Range    
 
 COLLECT  PT/D.   No   No   No   No    
 
 OR  BRADFOR  informa  informa  informa  informa  2013 
 
  D RN  tion in  tion in  tion in  tion in  11:35 
 
    source   source   source   source  AM
 
    data   data   data   data 
 
 ETHNICI  WHITE,   No   No   No   No    
 
 TY  NON-HIS  informa  informa  informa  informa  2013 
 
  PANIC  tion in  tion in  tion in  tion in  11:35 
 
    source   source   source   source  AM
 
    data   data   data   data 
 
 KIT   04-30-2  No   No   No   No    
 
 EXPIRAT  014  informa  informa  informa  informa  2013 
 
 ION    tion in  tion in  tion in  tion in  11:35 
 
 DATE    source   source   source   source  AM
 
    data   data   data   data 
 
 SYMPTOM  NO  No   No   No   No    
 
 S   informa  informa  informa  informa  2013 
 
   tion in  tion in  tion in  tion in  11:35 
 
    source   source   source   source  AM
 
    data   data   data   data 
 
 REASON   REVISIT  No   No   No   No    
 
 FOR   /ANNUAL  informa  informa  informa  informa  2013 
 
 REQUEST   FAMILY  tion in  tion in  tion in  tion in  11:35 
 
      source   source   source   source  AM
 
  PLANNIN   data   data   data   data 
 
  G VISIT     
 
 SPECIME  URINE  No   No   No   No    
 
 N    informa  informa  informa  informa  2013 
 
 SOURCE   tion in  tion in  tion in  tion in  11:35 
 
    source   source   source   source  AM
 
    data   data   data   data 
 
 PREGNAN  NO  No   No   No   No    
 
 T   informa  informa  informa  informa  2013 
 
   tion in  tion in  tion in  tion in  11:35 
 
    source   source   source   source  AM
 
    data   data   data   data 
 
 CHART   NA  No   No   No   No    
 
 NUMBER   informa  informa  informa  informa  2013 
 
   tion in  tion in  tion in  tion in  11:35 
 
    source   source   source   source  AM
 
    data   data   data   data 
 
 Chlamyd  Pending  No   No   No   No    
 
 ia    informa  informa  informa  informa  2013 
 
 trachom   tion in  tion in  tion in  tion in  11:35 
 
 atis     source   source   source   source  AM
 
 rRNA     data   data   data   data 
 
 [Presen      
 
 ce] in       
 
 Unspeci      
 
 fied       
 
 specime      
 
 n by       
 
 Probe &      
 
  target      
 
        
 
 amplifi      
 
 cation       
 
 method      
 
 Neisser  Pending  No   No   No   \.br\ 
 
 ia    informa  informa  informa  is   2013 
 
 gonorrh   tion in  tion in  tion in  report   11:35 
 
 oeae     source   source   source  contain  AM
 
 rRNA     data   data   data  s  
 
 [Presen      patient 
 
 ce] in         
 
 Unspeci      informa 
 
 fied       tion  
 
 specime      that  
 
 n by       must be 
 
 Probe &        
 
  target      protect 
 
        ed in  
 
 amplifi      accorda 
 
 cation       nce  
 
 method      with  
 
      the  
 
      Health  
 
      [facility] 
 
      ce  
 
      Portabi 
 
      lity  
 
      and  
 
      Account 
 
      ability 
 
       Act. 
 
 
 
 
 CHLAMYDIA AND GONORRHEA TESTING
 
 
 
 
 Observa  Value  Referen  Units  Interpr  Notes  Date
 
 tion   ce    etation  
 
   Range    
 
 COLLECT  NA  No   No   No   No   Oct 30 
 
 OR   informa  informa  informa  informa  2012 
 
   tion in  tion in  tion in  tion in  4:30 PM
 
    source   source   source   source 
 
    data   data   data   data 
 
 ETHNICI  WHITE,   No   No   No   No   Oct 30 
 
 TY  NON-HIS  informa  informa  informa  informa  2012 
 
  PANIC  tion in  tion in  tion in  tion in  4:30 PM
 
    source   source   source   source 
 
    data   data   data   data 
 
 KIT   1-31-13  No   No   No   No   Oct 30 
 
 EXPIRAT   informa  informa  informa  informa  2012 
 
 ION    tion in  tion in  tion in  tion in  4:30 PM
 
 DATE    source   source   source   source 
 
    data   data   data   data 
 
 SYMPTOM  NO  No   No   No   No   Oct 30 
 
 S   informa  informa  informa  informa  2012 
 
   tion in  tion in  tion in  tion in  4:30 PM
 
    source   source   source   source 
 
    data   data   data   data 
 
 REASON   REVISIT  No   No   No   No   Oct 30 
 
 FOR   /ANNUAL  informa  informa  informa  informa  2012 
 
 REQUEST   FAMILY  tion in  tion in  tion in  tion in  4:30 PM
 
      source   source   source   source 
 
  PLANNIN   data   data   data   data 
 
  G VISIT     
 
 SPECIME  URINE  No   No   No   No   Oct 30 
 
 N    informa  informa  informa  informa  2012 
 
 SOURCE   tion in  tion in  tion in  tion in  4:30 PM
 
    source   source   source   source 
 
    data   data   data   data 
 
 PREGNAN  NO  No   No   No   No   Oct 30 
 
 T   informa  informa  informa  informa  2012 
 
   tion in  tion in  tion in  tion in  4:30 PM
 
    source   source   source   source 
 
    data   data   data   data 
 
 CHART   NA  No   No   No   No   Oct 30 
 
 NUMBER   informa  informa  informa  informa  2012 
 
   tion in  tion in  tion in  tion in  4:30 PM
 
    source   source   source   source 
 
    data   data   data   data 
 
 Chlamyd  NEGATIV  No   No   No   NEGATIV  Oct 30 
 
 ia   E  informa  informa  informa  E   2012 
 
 trachom   tion in  tion in  tion in  RESULT=  4:30 PM
 
 atis     source   source   source   WITHIN 
 
 rRNA     data   data   data   NORMAL 
 
 [Presen        
 
 ce] in       LIMITSP 
 
 Unspeci      OSITIVE 
 
 fied         
 
 specime      RESULT= 
 
 n by         
 
 Probe &      ABNORMA 
 
  target      LEQUIVO 
 
        ECTOR  
 
 amplifi      RESULT= 
 
 cation         
 
 method      INDETER 
 
      MINATEU 
 
      NSATISF 
 
      ACTORY  
 
      RESULT= 
 
        
 
      INVALID 
 
 Neisser  NEGATIV  No   No   No   NEGATIV  Oct 30 
 
 ia   E  informa  informa  informa  E   2012 
 
 gonorrh   tion in  tion in  tion in  RESULT=  4:30 PM
 
 oeae     source   source   source   WITHIN 
 
 rRNA     data   data   data   NORMAL 
 
 [Presen        
 
 ce] in       LIMITSP 
 
 Unspeci      OSITIVE 
 
 fied         
 
 specime      RESULT= 
 
 n by         
 
 Probe &      ABNORMA 
 
  target      LEQUIVO 
 
        ECTOR  
 
 amplifi      RESULT= 
 
 cation         
 
 method      INDETER 
 
      MINATEU 
 
      NSATISF 
 
      ACTORY  
 
      RESULT= 
 
        
 
      INVALID 
 
      THE  
 
      APTIMA  
 
      COMBO 2 
 
       ASSAY  
 
      IS NOT  
 
      INTENDE 
 
      D FOR  
 
      THE  
 
      EVALUAT 
 
      ION OF  
 
      SUSPECT 
 
      EDSEXUA 
 
      L ABUSE 
 
       OR FOR 
 
       OTHER  
 
      MEDICO- 
 
      LEGAL  
 
      INDICAT 
 
      IONS.   
 
      FOR  
 
      THOSE  
 
      PATIENT 
 
      S  
 
      FORWHOM 
 
       A  
 
      FALSE  
 
      POSITIV 
 
      E  
 
      RESULT  
 
      MAY  
 
      HAVE  
 
      ADVERSE 
 
        
 
      PSYCHO- 
 
      SOCIAL  
 
      IMPACT, 
 
       THE  
 
      CDCRECO 
 
      MMENDS  
 
      RETESTI 
 
      NG.\.br 
 
      \This  
 
      report  
 
      contain 
 
      s  
 
      patient 
 
        
 
      informa 
 
      tion  
 
      that  
 
      must be 
 
        
 
      protect 
 
      ed in  
 
      [facility] 
 
      nce  
 
      with  
 
      the  
 
      Health  
 
      [facility] 
 
      ce  
 
      Portphyllis 
 
      litjuan  
 
      and  
 
      Account 
 
      ability 
 
       Act. 
 
 
 
 
 CHLAMYDIA AND GONORRHEA TESTING
 
 
 
 
 Observa  Value  Referen  Units  Interpr  Notes  Date
 
 tion   ce    etation  
 
   Range    
 
 COLLECT  NA  No   No   No   No   Oct 30 
 
 OR   informa  informa  informa  informa  2012 
 
   tion in  tion in  tion in  tion in  4:30 PM
 
    source   source   source   source 
 
    data   data   data   data 
 
 ETHNICI  WHITE,   No   No   No   No   Oct 30 
 
 TY  NON-HIS  informa  informa  informa  informa  2012 
 
  PANIC  tion in  tion in  tion in  tion in  4:30 PM
 
    source   source   source   source 
 
    data   data   data   data 
 
 KIT   1-31-13  No   No   No   No   Oct 30 
 
 EXPIRAT   informa  informa  informa  informa  2012 
 
 ION    tion in  tion in  tion in  tion in  4:30 PM
 
 DATE    source   source   source   source 
 
    data   data   data   data 
 
 SYMPTOM  NO  No   No   No   No   Oct 30 
 
 S   informa  informa  informa  informa  2012 
 
   tion in  tion in  tion in  tion in  4:30 PM
 
    source   source   source   source 
 
    data   data   data   data 
 
 REASON   REVISIT  No   No   No   No   Oct 30 
 
 FOR   /ANNUAL  informa  informa  informa  informa  2012 
 
 REQUEST   FAMILY  tion in  tion in  tion in  tion in  4:30 PM
 
      source   source   source   source 
 
  PLANNIN   data   data   data   data 
 
  G VISIT     
 
 SPECIME  URINE  No   No   No   No   Oct 30 
 
 N    informa  informa  informa  informa  2012 
 
 SOURCE   tion in  tion in  tion in  tion in  4:30 PM
 
    source   source   source   source 
 
    data   data   data   data 
 
 PREGNAN  NO  No   No   No   No   Oct 30 
 
 T   informa  informa  informa  informa  2012 
 
   tion in  tion in  tion in  tion in  4:30 PM
 
    source   source   source   source 
 
    data   data   data   data 
 
 CHART   NA  No   No   No   No   Oct 30 
 
 NUMBER   informa  informa  informa  informa  2012 
 
   tion in  tion in  tion in  tion in  4:30 PM
 
    source   source   source   source 
 
    data   data   data   data 
 
 Chlamyd  Pending  No   No   No   No   Oct 30 
 
 ia    informa  informa  informa  informa  2012 
 
 trachom   tion in  tion in  tion in  tion in  4:30 PM
 
 atis     source   source   source   source 
 
 rRNA     data   data   data   data 
 
 [Presen      
 
 ce] in       
 
 Unspeci      
 
 fied       
 
 specime      
 
 n by       
 
 Probe &      
 
  target      
 
        
 
 amplifi      
 
 cation       
 
 method      
 
 Neisser  Pending  No   No   No   \.br\Th  Oct 30 
 
 ia    informa  informa  informa  is   2012 
 
 gonorrh   tion in  tion in  tion in  report   4:30 PM
 
 oeae     source   source   source  contain 
 
 rRNA     data   data   data  s  
 
 [Presen      patient 
 
 ce] in         
 
 Unspeci      informa 
 
 fied       tion  
 
 specime      that  
 
 n by       must be 
 
 Probe &        
 
  target      protect 
 
        ed in  
 
 amplifi      accorda 
 
 cation       nce  
 
 method      with  
 
      the  
 
      Health  
 
      [facility] 
 
      ce  
 
      Portabi 
 
      lity  
 
      and  
 
      Account 
 
      ability 
 
       Act. 
 
 
 
 
 CHLAMYDIA AND GONORRHEA TESTING
 
 
 
 
 Observa  Value  Referen  Units  Interpr  Notes  Date
 
 tion   ce    etation  
 
   Range    
 
 COLLECT  NA  No   No   No   No   Oct 4 
 
 OR   informa  informa  informa  informa  2011 
 
   tion in  tion in  tion in  tion in  11:23 
 
    source   source   source   source  AM
 
    data   data   data   data 
 
 ETHNICI  WHITE,   No   No   No   No   Oct 4 
 
 TY  NON-HIS  informa  informa  informa  informa  2011 
 
  PANIC  tion in  tion in  tion in  tion in  11:23 
 
    source   source   source   source  AM
 
    data   data   data   data 
 
 KIT   -  No   No   No   No   Oct 4 
 
 EXPIRAT  1  informa  informa  informa  informa  2011 
 
 ION    tion in  tion in  tion in  tion in  11:23 
 
 DATE    source   source   source   source  AM
 
    data   data   data   data 
 
 SYMPTOM  NO  No   No   No   No   Oct 4 
 
 S   informa  informa  informa  informa  2011 
 
   tion in  tion in  tion in  tion in  11:23 
 
    source   source   source   source  AM
 
    data   data   data   data 
 
 REASON   REVISIT  No   No   No   No   Oct 4 
 
 FOR   /ANNUAL  informa  informa  informa  informa  2011 
 
 REQUEST   FAMILY  tion in  tion in  tion in  tion in  11:23 
 
      source   source   source   source  AM
 
  PLANNIN   data   data   data   data 
 
  G VISIT     
 
 SPECIME  URINE  No   No   No   No   Oct 4 
 
 N    informa  informa  informa  informa  2011 
 
 SOURCE   tion in  tion in  tion in  tion in  11:23 
 
    source   source   source   source  AM
 
    data   data   data   data 
 
 PREGNAN  NO  No   No   No   No   Oct 4 
 
 T   informa  informa  informa  informa  2011 
 
   tion in  tion in  tion in  tion in  11:23 
 
    source   source   source   source  AM
 
    data   data   data   data 
 
 CHART   NA  No   No   No   No   Oct 4 
 
 NUMBER   informa  informa  informa  informa  2011 
 
   tion in  tion in  tion in  tion in  11:23 
 
    source   source   source   source  AM
 
    data   data   data   data 
 
 Chlamyd  NEGATIV  No   No   No   NEGATIV  Oct 4 
 
 ia   E  informa  informa  informa  E   2011 
 
 trachom   tion in  tion in  tion in  RESULT=  11:23 
 
 atis     source   source   source   WITHIN  AM
 
 rRNA     data   data   data   NORMAL 
 
 [Presen        
 
 ce] in       LIMITSP 
 
 Unspeci      OSITIVE 
 
 fied         
 
 specime      RESULT= 
 
 n by         
 
 Probe &      ABNORMA 
 
  target      LEQUIVO 
 
        ECTOR  
 
 amplifi      RESULT= 
 
 cation         
 
 method      INDETER 
 
      MINATEU 
 
      NSATISF 
 
      ACTORY  
 
      RESULT= 
 
        
 
      INVALID 
 
 Neisser  NEGATIV  No   No   No   NEGATIV  Oct 4 
 
 ia   E  informa  informa  informa  E   2011 
 
 gonorrh   tion in  tion in  tion in  RESULT=  11:23 
 
 oeae     source   source   source   WITHIN  AM
 
 rRNA     data   data   data   NORMAL 
 
 [Presen        
 
 ce] in       LIMITSP 
 
 Unspeci      OSITIVE 
 
 fied         
 
 specime      RESULT= 
 
 n by         
 
 Probe &      ABNORMA 
 
  target      LEQUIVO 
 
        ECTOR  
 
 amplifi      RESULT= 
 
 cation         
 
 method      INDETER 
 
      MINATEU 
 
      NSATISF 
 
      ACTORY  
 
      RESULT= 
 
        
 
      INVALID 
 
      EFFECTI 
 
      VE  
 
      NOVEMBE 
 
      R 2010:  
 
      THE  
 
      APTIMA  
 
      COMBO 2 
 
        
 
      NUCLEIC 
 
        
 
      ACIDAMP 
 
      LIFICAT 
 
      ION  
 
      ASSAY  
 
      IS NOT  
 
      INTENDE 
 
      D FOR  
 
      THE  
 
      EVALUAT 
 
      ION  
 
      OFSUSPE 
 
      CTED  
 
      SEXUAL  
 
      ABUSE  
 
      OR FOR  
 
      OTHER  
 
      MEDICO- 
 
      LEGAL  
 
      INDICAT 
 
      IONS.FA 
 
      LSE  
 
      POSITIV 
 
      E  
 
      RESULTS 
 
       ARE  
 
      POSSIBL 
 
      E.\.br\ 
 
      This  
 
      report  
 
      contain 
 
      s  
 
      patient 
 
        
 
      informa 
 
      tion  
 
      that  
 
      must be 
 
        
 
      protect 
 
      ed in  
 
      accorda 
 
      nce  
 
      with  
 
      the  
 
      Health  
 
      [facility] 
 
      ce  
 
      Portabi 
 
      lity  
 
      and  
 
      Account 
 
      ability 
 
       Act. 
 
 
 
 
 CHLAMYDIA AND GONORRHEA TESTING
 
 
 
 
 Observa  Value  Referen  Units  Interpr  Notes  Date
 
 tion   ce    etation  
 
   Range    
 
 COLLECT  NA  No   No   No   No   Oct 4 
 
 OR   informa  informa  informa  informa   
 
   tion in  tion in  tion in  tion in  11:23 
 
    source   source   source   source  AM
 
    data   data   data   data 
 
 ETHNICI  WHITE,   No   No   No   No   Oct 4 
 
 TY  NON-HIS  informa  informa  informa  informa   
 
  PANIC  tion in  tion in  tion in  tion in  11:23 
 
    source   source   source   source  AM
 
    data   data   data   data 
 
 KIT   --1  No   No   No   No   Oct 4 
 
 EXPIRAT  1  informa  informa  informa  informa  2011 
 
 ION    tion in  tion in  tion in  tion in  11:23 
 
 DATE    source   source   source   source  AM
 
    data   data   data   data 
 
 SYMPTOM  NO  No   No   No   No   Oct 4 
 
 S   informa  informa  informa  informa  2011 
 
   tion in  tion in  tion in  tion in  11:23 
 
    source   source   source   source  AM
 
    data   data   data   data 
 
 REASON   REVISIT  No   No   No   No   Oct 4 
 
 FOR   /ANNUAL  informa  informa  informa  informa  2011 
 
 REQUEST   FAMILY  tion in  tion in  tion in  tion in  11:23 
 
      source   source   source   source  AM
 
  PLANNIN   data   data   data   data 
 
  G VISIT     
 
 SPECIME  URINE  No   No   No   No   Oct 4 
 
 N    informa  informa  informa  informa  2011 
 
 SOURCE   tion in  tion in  tion in  tion in  11:23 
 
    source   source   source   source  AM
 
    data   data   data   data 
 
 PREGNAN  NO  No   No   No   No   Oct 4 
 
 T   informa  informa  informa  informa  2011 
 
   tion in  tion in  tion in  tion in  11:23 
 
    source   source   source   source  AM
 
    data   data   data   data 
 
 CHART   NA  No   No   No   No   Oct 4 
 
 NUMBER   informa  informa  informa  informa  2011 
 
   tion in  tion in  tion in  tion in  11:23 
 
    source   source   source   source  AM
 
    data   data   data   data 
 
 Chlamyd  NEGATIV  No   No   No   NEGATIV  Oct 4 
 
 ia   E  informa  informa  informa  E   2011 
 
 trachom   tion in  tion in  tion in  RESULT=  11:23 
 
 atis     source   source   source   WITHIN  AM
 
 rRNA     data   data   data   NORMAL 
 
 [Presen        
 
 ce] in       LIMITSP 
 
 Unspeci      OSITIVE 
 
 fied         
 
 specime      RESULT= 
 
 n by         
 
 Probe &      ABNORMA 
 
  target      LEQUIVO 
 
        ECTOR  
 
 amplifi      RESULT= 
 
 cation         
 
 method      INDETER 
 
      MINATEU 
 
      NSATISF 
 
      ACTORY  
 
      RESULT= 
 
        
 
      INVALID 
 
 Neisser  NEGATIV  No   No   No   NEGATIV  Oct 4 
 
 ia   E  informa  informa  informa  E    
 
 gonorrh   tion in  tion in  tion in  RESULT=  11:23 
 
 oeae     source   source   source   WITHIN  AM
 
 rRNA     data   data   data   NORMAL 
 
 [Presen        
 
 ce] in       LIMITSP 
 
 Unspeci      OSITIVE 
 
 fied         
 
 specime      RESULT= 
 
 n by         
 
 Probe &      ABNORMA 
 
  target      LEQUIVO 
 
        ECTOR  
 
 amplifi      RESULT= 
 
 cation         
 
 method      INDETER 
 
      MINATEU 
 
      NSATISF 
 
      ACTORY  
 
      RESULT= 
 
        
 
      INVALID 
 
      EFFECTI 
 
      VE  
 
      NOVEMBE 
 
      R [id number]:  
 
      THE  
 
      APTIMA  
 
      COMBO 2 
 
        
 
      NUCLEIC 
 
        
 
      ACIDAMP 
 
      LIFICAT 
 
      ION  
 
      ASSAY  
 
      IS NOT  
 
      INTENDE 
 
      D FOR  
 
      THE  
 
      EVALUAT 
 
      ION  
 
      OFSUSPE 
 
      CTED  
 
      SEXUAL  
 
      ABUSE  
 
      OR FOR  
 
      OTHER  
 
      MEDICO- 
 
      LEGAL  
 
      INDICAT 
 
      IONS.FA 
 
      LSE  
 
      POSITIV 
 
      E  
 
      RESULTS 
 
       ARE  
 
      POSSIBL 
 
      E.\.br\ 
 
      This  
 
      report  
 
      contain 
 
      s  
 
      patient 
 
        
 
      informa 
 
      tion  
 
      that  
 
      must be 
 
        
 
      protect 
 
      ed in  
 
      accorda 
 
      nce  
 
      with  
 
      the  
 
      Health  
 
      [facility] 
 
      ce  
 
      Portabi 
 
      lity  
 
      and  
 
      Account 
 
      ability 
 
       Act.

## 2017-11-19 NOTE — EXTERNAL MEDICAL SUMMARY RPT
[facility] Patient Summary
 Created on: 2017
 
JAMMIE SIMMONS
External Reference #: [id number]
: 89
Sex: Female
 
Demographics
 
 
 
 Address  [place]  [address]
 
 Home Phone  [phone]
 
 Preferred Language  Unknown
 
 Marital Status  Unknown
 
 Judaism Affiliation  Unknown
 
 Race  Unknown
 
 Ethnic Group  Unknown
 
 
Author
 
 
 
 Author  [facility] [place], [employer] 
 
 Organization  [employer]
 
 Address  Unknown
 
 Phone  Unavailable
 
 
 
Results
 
 
 
 25-Hydroxyvitamin D [Mass/volume] in Serum or Plasma
 
 
 
 
 Observa  Value  Referen  Units  Interpr  Notes  Date
 
 tion   ce    etation  
 
   Range    
 
 
 
 
 25-Hydrox  30.0 -   ng/mL  Low  Vitamin D  Oct 12 
 
 yvitamin   100.0        2:15
 
 D      deficienc   PM
 
 [Mass/vol     y has  
 
 ume] in      been  
 
 Serum or      defined  
 
 Plasma     by the  
 
     [facility] 
 
       
 
     [facility] 
 
     e and an  
 
     Endocrine 
 
      Society  
 
     practice  
 
     guideline 
 
      as  
 
     alevel of 
 
      serum  
 
     25-OH  
 
     vitamin D 
 
      less  
 
     than 20  
 
     ng/mL  
 
     (1,2).The 
 
       
 
     Endocrine 
 
      Society  
 
     went on  
 
     to  
 
     further  
 
     define  
 
     vitamin  
 
     Dinsuffic 
 
     iency as  
 
     a level  
 
     between  
 
     21 and 29 
 
      ng/mL  
 
     (2).1.  
 
     IOM  
 
     (Institut 
 
     e of  
 
     Medicine) 
 
     . 2010.  
 
     Dietary  
 
     reference 
 
     intakes  
 
     for  
 
     calcium  
 
     and D.  
 
     [place] 
 
     n DC:  
 
     [employer] 
 
     al  
 
     [employer] 
 
      Press.2. 
 
      Lo  
 
     MF,  
 
     Dahlia  
 
     [place],  
 
     [place] Epps  
 
     FRANCIS, et  
 
     al.Evalua 
 
     tion,  
 
     treatment 
 
     , and  
 
     preventio 
 
     n of  
 
     vitamin  
 
     Ddeficien 
 
     cy: an  
 
     Endocrine 
 
      Society  
 
     clinical  
 
     practiceg 
 
     uideline. 
 
      [facility].  
 
     2011; 
 
       
 
     96(7):191 
 
     1-30.Perf 
 
     ormed at: 
 
        -  
 
     [facility]  
 
     [id number] [place] [address]   
 
     [id number] 
 
     Lab  
 
     Director: 
 
      Joseph Dowell 
 
      PhD,  
 
     Phone:   
 
     [phone] 
 
     0 
 
 
 
 
 Hemoglobin A1c in Blood
 
 
 
 
 Observa  Value  Referen  Units  Interpr  Notes  Date
 
 tion   ce    etation  
 
   Range    
 
 Hemoglo  5.2  0.0 -   %  Normal  < 6%     Oct 12 
 
 bin A1c   7.0    NON-NIKI  2017 
 
  in       BETIC   2:15 PM
 
 Blood      LEVEL<  
 
      7%    
 
      CONTROL 
 
      LED  
 
      DIABETI 
 
      C  
 
      LEVEL>  
 
      8%    
 
      POORLY  
 
      CONTROL 
 
      LED  
 
      DIABETI 
 
      C LEVEL 
 
 
 
 
 Comprehensive metabolic 2000 panel in Serum or Plasma
 
 
 
 
 Observa  Value  Referen  Units  Interpr  Notes  Date
 
 tion   ce    etation  
 
   Range    
 
 
 
 
 Albumin/G  1.1 - 1.8  No   Normal  No   Oct 12 
 
 lobulin    informati   informati  2017 2:15
 
 [Mass    on in    on in    PM
 
 ratio] in   source    source  
 
  Serum or   data   data 
 
  Plasma     
 
 Albumin   3.4 - 5.0  gm/dL  Normal  No   Oct 12 
 
 [Mass/vol     informati  2017 2:15
 
 ume] in      on in    PM
 
 Serum or      source  
 
 Plasma     data 
 
 Alkaline   46 - 116  U/L  Normal  No   Oct 12 
 
 phosphata     informati  2017 2:15
 
 se      on in    PM
 
 [Enzymati     source  
 
 c      data 
 
 activity/     
 
 volume]      
 
 in Serum      
 
 or Plasma     
 
 Bilirubin  0.2 - 1.0  mg/dL  Normal  No   Oct 12 
 
 .total      informati  2017 2:15
 
 [Mass/vol     on in    PM
 
 ume] in      source  
 
 Serum or      data 
 
 Plasma     
 
 Urea   7 - 18  mg/dL  Normal  No   Oct 12 
 
 nitrogen      informati   2:15
 
 [Mass/vol     on in    PM
 
 ume] in      source  
 
 Serum or      data 
 
 Plasma     
 
 Calcium   8.5 -   mg/dL  Normal  No   Oct 12 
 
 [Mass/vol  10.1    informati   2:15
 
 ume] in      on in    PM
 
 Serum or      source  
 
 Plasma     data 
 
 Chloride   98 - 107  mmoL/L  Normal  No   Oct 12 
 
 [Moles/vo     informati   2:15
 
 lume] in      on in    PM
 
 Serum or      source  
 
 Plasma     data 
 
 Carbon   21.0 -   mmoL/L  Normal  No   Oct 12 
 
 dioxide,   32.0    informati  2017 2:15
 
 total      on in    PM
 
 [Moles/vo     source  
 
 lume] in      data 
 
 Serum or      
 
 Plasma     
 
 Creatinin  0.55 -   mg/dL  Normal  No   Oct 12 
 
 e   1.02    informati  2017 2:15
 
 [Mass/vol     on in    PM
 
 ume] in      source  
 
 Serum or      data 
 
 Plasma     
 
 Estimated  59-  ML/MIN  No   REFERENCE  Oct 12 
 
      informati   RANGE:   2017 2:15
 
 glomerula    on in   >60    PM
 
 r     source   ML/MIN/1. 
 
 filtratio    data  73 SQUARE 
 
 n rate       METERSIf 
 
 (GF      this  
 
     patient  
 
     is  
 
     -A 
 
     merican,  
 
     then  
 
     multiply  
 
     theresult 
 
      by  
 
     1.210. 
 
 Globulin   1.3 - 3.2  gm/dL  Normal  No   Oct 12 
 
 [Mass/vol     informati  2017 2:15
 
 ume] in      on in    PM
 
 Serum     source  
 
     data 
 
 Glucose   74 - 106  mg/dL  Normal  No   Oct 12 
 
 [Mass/vol     informati  2017 2:15
 
 ume] in      on in    PM
 
 Serum or      source  
 
 Plasma     data 
 
 Potassium  3.5 - 5.1  mmoL/L  Normal  No   Oct 12 
 
       informati  2017 2:15
 
 [Moles/vo     on in    PM
 
 lume] in      source  
 
 Serum or      data 
 
 Plasma     
 
 Sodium   136 - 145  mmoL/L  Normal  No   Oct 12 
 
 [Moles/vo     informati  2017 2:15
 
 lume] in      on in    PM
 
 Serum or      source  
 
 Plasma     data 
 
 Aspartate  15 - 37  U/L  Low  No   Oct 12 
 
       inform2017 2:15
 
 aminotran     on in    PM
 
 sferase      source  
 
 [Enzymati     data 
 
 c      
 
 activity/     
 
 volume]      
 
 in Serum      
 
 or Plasma     
 
 Alanine   12 - 78  U/L  Normal  No   Oct 12 
 
 aminotran     2017 2:15
 
 sferase      on in    PM
 
 [Enzymati     source  
 
 c      data 
 
 activity/     
 
 volume]      
 
 in Serum      
 
 or Plasma     
 
 Protein   6.4 - 8.2  gm/dL  Normal  No   Oct 12 
 
 [Mass/vol     informati   2:15
 
 ume] in      on in    PM
 
 Serum or      source  
 
 Plasma     data 
 
 
 
 
 Thyroxine (T4) free [Mass/volume] in Serum or Plasma
 
 
 
 
 Observa  Value  Referen  Units  Interpr  Notes  Date
 
 tion   ce    etation  
 
   Range    
 
 
 
 
 Thyroxine  0.76 -   ng/dL  Normal  No   Oct 12 
 
  (T4)   1.46    2017 2:15
 
 free      on in    PM
 
 [Mass/vol     source  
 
 ume] in      data 
 
 Serum or      
 
 Plasma     
 
 
 
 
 Lipid 1996 panel in Serum or Plasma
 
 
 
 
 Observa  Value  Referen  Units  Interpr  Notes  Date
 
 tion   ce    etation  
 
   Range    
 
 
 
 
 Cholester  < 200  mg/dL  No   No   Oct 12 
 
 ol     informati  inform2017 2:15
 
 [Moles/vo    on in   on in    PM
 
 lume] in     source   source  
 
 Unspecifi    data  data 
 
 ed      
 
 specimen     
 
 Cholester  40 - 60  MG/DL  Normal  No   Oct 12 
 
 ol in HDL     2017 2:15
 
       on in    PM
 
 [Mass/vol     source  
 
 ume] in      data 
 
 Serum or      
 
 Plasma     
 
 Cholester  0 - 130  mg/dL  Normal  No   Oct 12 
 
 ol in LDL     2017 2:15
 
       on in    PM
 
 [Mass/vol     source  
 
 ume] in      data 
 
 Serum or      
 
 Plasma by     
 
       
 
 doug     
 
 on     
 
 Triglycer  30 - 200  mg/dL  Normal  No   Oct 12 
 
 silvina      2017 2:15
 
 [Moles/vo     on in    PM
 
 lume] in      source  
 
 Serum or      data 
 
 Plasma     
 
 Cholester  0 - 40  No   Normal  No   Oct 12 
 
 ol in    informati   informati   2:15
 
 VLDL    on in    on in    PM
 
 [Mass/vol   source    source  
 
 ume] in    data   data 
 
 Serum or      
 
 Plasma     
 
 
 
 
 Thyrotropin [Units/volume] in Serum or Plasma
 
 
 
 
 Observa  Value  Referen  Units  Interpr  Notes  Date
 
 tion   ce    etation  
 
   Range    
 
 
 
 
 Thyrotrop  0.358 -   uIU/ml  Normal  No   Oct 12 
 
 in   3.740    informati   2:15
 
 [Units/vo     on in    PM
 
 lume] in      source  
 
 Serum or      data 
 
 Plasma     
 
 
 
 
 CBC W Auto Differential panel in Blood
 
 
 
 
 Observa  Value  Referen  Units  Interpr  Notes  Date
 
 tion   ce    etation  
 
   Range    
 
 
 
 
 Basophils  0 - 0.2  K/MM3  Normal  No   Oct 12 
 
       informati   2:15
 
 [#/volume     on in    PM
 
 ] in      source  
 
 Blood by      data 
 
 Automated     
 
  count     
 
 Basophils  0.1 - 2.0  %  Normal  No   Oct 12 
 
 /      informati   2:15
 
 leukocyte     on in    PM
 
 s in      source  
 
 Blood by      data 
 
 Automated     
 
  count     
 
 Eosinophi  0.0 - 0.4  K/mm3  Normal  No   Oct 12 
 
 ls      informati   2:15
 
 [#/volume     on in    PM
 
 ] in      source  
 
 Blood by      data 
 
 Automated     
 
  count     
 
 Eosinophi  0.1 -   %  Normal  No   Oct 12 
 
 ls/100   12.0    informati   2:15
 
 leukocyte     on in    PM
 
 s in      source  
 
 Blood by      data 
 
 Automated     
 
  count     
 
 Granulocy  1.8 - 7.8  K/mm3  Normal  No   Oct 12 
 
 janis      informati   2:15
 
 [#/volume     on in    PM
 
 ] in      source  
 
 Blood by      data 
 
 Automated     
 
  count     
 
 Granulocy  37.0 -   %  Normal  No   Oct 12 
 
 janis/100   80.0    informati   2:15
 
 leukocyte     on in    PM
 
 s in      source  
 
 Blood by      data 
 
 Automated     
 
  count     
 
 Hematocri  37.0 -   %  Normal  No   Oct 12 
 
 t [Volume  47.0    informati   2:15
 
       on in    PM
 
 Fraction]     source  
 
  of Blood     data 
 
 Hemoglobi  12.2 -   g/dL  Normal  No   Oct 12 
 
 n   16.2    informati   2:15
 
 [Mass/vol     on in    PM
 
 ume] in      source  
 
 Blood     data 
 
 Lymphocyt  0.7 - 4.5  K/mm3  Normal  No   Oct 12 
 
 es      informati   2:15
 
 [#/volume     on in    PM
 
 ] in      source  
 
 Unspecifi     data 
 
 ed      
 
 specimen      
 
 by      
 
 Automated     
 
  count     
 
 Lymphocyt  10 - 50.0  %  Normal  No   Oct 12 
 
 es      informati   2:15
 
 [#/volume     on in    PM
 
 ] in      source  
 
 Unspecifi     data 
 
 ed      
 
 specimen      
 
 by      
 
 Automated     
 
  count     
 
 Erythrocy  27 - 31.2  pg  Normal  No   Oct 12 
 
 te mean      informati   2:15
 
 corpuscul     on in    PM
 
 ar      source  
 
 hemoglobi     data 
 
 n      
 
 [Entitic      
 
 mass]     
 
 Erythrocy  31.8 -   g/dl  Normal  No   Oct 12 
 
 te mean   35.4    informati   2:15
 
 corpuscul     on in    PM
 
 ar      source  
 
 hemoglobi     data 
 
 n      
 
 concentra     
 
 tion      
 
 [Mass/vol     
 
 ume] by      
 
 Automated     
 
  count     
 
 Erythrocy  82.2 -   fl  Normal  No   Oct 12 
 
 te mean   97.8    informati   2:15
 
 corpuscul     on in    PM
 
 ar volume     source  
 
  [Entitic     data 
 
  volume]      
 
 by      
 
 Automated     
 
  count     
 
 Monocytes  0.1 - 1.0  K/mm3  Normal  No   Oct 12 
 
       inform2017 2:15
 
 [#/volume     on in    PM
 
 ] in      source  
 
 Blood by      data 
 
 Automated     
 
  count     
 
 Monocytes  1.7 - 9.3  %  Normal  No   Oct 12 
 
 /100      informati   2:15
 
 leukocyte     on in    PM
 
 s in      source  
 
 Blood by      data 
 
 Automated     
 
  count     
 
 Platelet   7.4 -   fl  Low  No   Oct 12 
 
 mean   10.4    informati   2:15
 
 volume      on in    PM
 
 [Entitic      source  
 
 volume]      data 
 
 in Blood      
 
 by      
 
 Automated     
 
  count     
 
 Platelets  142 - 424  K/mm3  Normal  No   Oct 12 
 
       inform2017 2:15
 
 [#/volume     on in    PM
 
 ] in      source  
 
 Blood     data 
 
 Erythrocy  4.2 - 5.4  M/mm3  Normal  No   Oct 12 
 
 janis      inform2017 2:15
 
 [#/volume     on in    PM
 
 ] in      source  
 
 Amniotic      data 
 
 fluid     
 
 Erythrocy  11.5 -   %  Normal  No   Oct 12 
 
 te   17.5    informati   2:15
 
 distribut     on in    PM
 
 ion width     source  
 
  [Entitic     data 
 
  volume]      
 
 by      
 
 Automated     
 
  count     
 
 Leukocyte  4.8 -   K/MM3  Normal  No   Oct 12 
 
 s   10.8    informati   2:15
 
 [#/volume     on in    PM
 
 ] in      source  
 
 Blood     data 
 
 
 
 
 Lactate [Moles/volume] in Blood
 
 
 
 
 Observa  Value  Referen  Units  Interpr  Notes  Date
 
 tion   ce    etation  
 
   Range    
 
 
 
 
 Lactate   0.4 - 2.0  mmol/L  Normal  No   Aug 3 
 
 [Moles/vo     ati   2:04
 
 lume] in      on in    AM
 
 Blood     source  
 
     data 
 
 
 
 
 Comprehensive metabolic 2000 panel in Serum or Plasma
 
 
 
 
 Observa  Value  Referen  Units  Interpr  Notes  Date
 
 tion   ce    etation  
 
   Range    
 
 
 
 
 Albumin/G  1.1 - 1.8  No   Low  No   Aug 3 
 
 lobulin    informati   informati   2:04
 
 [Mass    on in    on in    AM
 
 ratio] in   source    source  
 
  Serum or   data   data 
 
  Plasma     
 
 Albumin   3.4 - 5.0  gm/dL  Normal  No   Aug 3 
 
 [Mass/vol     informati   2:04
 
 ume] in      on in    AM
 
 Serum or      source  
 
 Plasma     data 
 
 Alkaline   46 - 116  U/L  Normal  No   Aug 3 
 
 phosphata     informati   2:04
 
 se      on in    AM
 
 [Enzymati     source  
 
 c      data 
 
 activity/     
 
 volume]      
 
 in Serum      
 
 or Plasma     
 
 Bilirubin  0.2 - 1.0  mg/dL  Normal  No   Aug 3 
 
 .total      informati   2:04
 
 [Mass/vol     on in    AM
 
 ume] in      source  
 
 Serum or      data 
 
 Plasma     
 
 Urea   7 - 18  mg/dL  Low  No   Aug 3 
 
 nitrogen      informati   2:04
 
 [Mass/vol     on in    AM
 
 ume] in      source  
 
 Serum or      data 
 
 Plasma     
 
 Calcium   8.5 -   mg/dL  Normal  No   Aug 3 
 
 [Mass/vol  10.1    informati   2:04
 
 ume] in      on in    AM
 
 Serum or      source  
 
 Plasma     data 
 
 Chloride   98 - 107  mmoL/L  Normal  No   Aug 3 
 
 [Moles/vo     informati   2:04
 
 lume] in      on in    AM
 
 Serum or      source  
 
 Plasma     data 
 
 Carbon   21.0 -   mmoL/L  Normal  No   Aug 3 
 
 dioxide,   32.0    informati   2:04
 
 total      on in    AM
 
 [Moles/vo     source  
 
 lume] in      data 
 
 Serum or      
 
 Plasma     
 
 Creatinin  0.55 -   mg/dL  Normal  No   Aug 3 
 
 e   1.02    informati   2:04
 
 [Mass/vol     on in    AM
 
 ume] in      source  
 
 Serum or      data 
 
 Plasma     
 
 Creatinin  50 - 200  ML/MIN  Normal  No   Aug 3 
 
 e renal      informati   2:04
 
 clearance     on in    AM
 
       source  
 
 predicted     data 
 
  by      
 
 Cockcroft     
 
 -Gault      
 
 formula     
 
 Estimated  59-  ML/MIN  No   REFERENCE  Aug 3 
 
      informati   RANGE:   2017 2:04
 
 glomerula    on in   >60    AM
 
 r     source   ML/MIN/1. 
 
 filtratio    data  73 SQUARE 
 
 n rate       METERSIf 
 
 (GF      this  
 
     patient  
 
     is  
 
     -A 
 
     merican,  
 
     then  
 
     multiply  
 
     theresult 
 
      by  
 
     1.210. 
 
 Globulin   1.3 - 3.2  gm/dL  High  No   Aug 3 
 
 [Mass/vol     informati   2:04
 
 ume] in      on in    AM
 
 Serum     source  
 
     data 
 
 Glucose   74 - 106  mg/dL  Normal  No   Aug 3 
 
 [Mass/vol     informati   2:04
 
 ume] in      on in    AM
 
 Serum or      source  
 
 Plasma     data 
 
 Potassium  3.5 - 5.1  mmoL/L  Normal  No   Aug 3 
 
       informati   2:04
 
 [Moles/vo     on in    AM
 
 lume] in      source  
 
 Serum or      data 
 
 Plasma     
 
 Sodium   136 - 145  mmoL/L  Normal  No   Aug 3 
 
 [Moles/vo     informati   2:04
 
 lume] in      on in    AM
 
 Serum or      source  
 
 Plasma     data 
 
 Aspartate  15 - 37  U/L  Normal  No   Aug 3 
 
       informati   2:04
 
 aminotran     on in    AM
 
 sferase      source  
 
 [Enzymati     data 
 
 c      
 
 activity/     
 
 volume]      
 
 in Serum      
 
 or Plasma     
 
 Alanine   12 - 78  U/L  Normal  No   Aug 3 
 
 aminotran     informati   2:04
 
 sferase      on in    AM
 
 [Enzymati     source  
 
 c      data 
 
 activity/     
 
 volume]      
 
 in Serum      
 
 or Plasma     
 
 Protein   6.4 - 8.2  gm/dL  High  No   Aug 3 
 
 [Mass/vol     informati   2:04
 
 ume] in      on in    AM
 
 Serum or      source  
 
 Plasma     data 
 
 
 
 
 Streptococcus pyogenes Ag [Presence] in Unspecified specimen
 
 
 
 
 Observa  Value  Referen  Units  Interpr  Notes  Date
 
 tion   ce    etation  
 
   Range    
 
 Strepto  NEGATIV  No   No   No   No   Aug 3 
 
 coccus   E  informa  informa  informa  informa   
 
 pyogene   tion in  tion in  tion in  tion in  2:04 AM
 
 s Ag     source   source   source   source 
 
 [Presen    data   data   data   data 
 
 ce] in       
 
 Unspeci      
 
 fied       
 
 specime      
 
 n      
 
 
 
 
 CBC W Auto Differential panel in Blood
 
 
 
 
 Observa  Value  Referen  Units  Interpr  Notes  Date
 
 tion   ce    etation  
 
   Range    
 
 
 
 
 Basophils  0 - 0.2  K/MM3  Normal  No   Aug 3 
 
       informati   2:04
 
 [#/volume     on in    AM
 
 ] in      source  
 
 Blood by      data 
 
 Automated     
 
  count     
 
 Basophils  0.1 - 2.0  %  Normal  No   Aug 3 
 
 /100      informati   2:04
 
 leukocyte     on in    AM
 
 s in      source  
 
 Blood by      data 
 
 Automated     
 
  count     
 
 Eosinophi  0.0 - 0.4  K/mm3  Normal  No   Aug 3 
 
 ls      informati   2:04
 
 [#/volume     on in    AM
 
 ] in      source  
 
 Blood by      data 
 
 Automated     
 
  count     
 
 Eosinophi  0.1 -   %  Normal  No   Aug 3 
 
 ls/100   12.0    informati   2:04
 
 leukocyte     on in    AM
 
 s in      source  
 
 Blood by      data 
 
 Automated     
 
  count     
 
 Granulocy  1.8 - 7.8  K/mm3  Normal  No   Aug 3 
 
 janis      informati   2:04
 
 [#/volume     on in    AM
 
 ] in      source  
 
 Blood by      data 
 
 Automated     
 
  count     
 
 Granulocy  37.0 -   %  Normal  No   Aug 3 
 
 janis/100   80.0    informati   2:04
 
 leukocyte     on in    AM
 
 s in      source  
 
 Blood by      data 
 
 Automated     
 
  count     
 
 Hematocri  37.0 -   %  Normal  No   Aug 3 
 
 t [Volume  47.0    informati   2:04
 
       on in    AM
 
 Fraction]     source  
 
  of Blood     data 
 
 Hemoglobi  12.2 -   g/dL  Normal  No   Aug 3 
 
 n   16.2    informati   2:04
 
 [Mass/vol     on in    AM
 
 ume] in      source  
 
 Blood     data 
 
 Lymphocyt  0.7 - 4.5  K/mm3  Normal  No   Aug 3 
 
 es      informati   2:04
 
 [#/volume     on in    AM
 
 ] in      source  
 
 Unspecifi     data 
 
 ed      
 
 specimen      
 
 by      
 
 Automated     
 
  count     
 
 Lymphocyt  10 - 50.0  %  Normal  No   Aug 3 
 
 es      informati   2:04
 
 [#/volume     on in    AM
 
 ] in      source  
 
 Unspecifi     data 
 
 ed      
 
 specimen      
 
 by      
 
 Automated     
 
  count     
 
 Erythrocy  27 - 31.2  pg  Normal  No   Aug 3 
 
 te mean      inform2017 2:04
 
 corpuscul     on in    AM
 
 ar      source  
 
 hemoglobi     data 
 
 n      
 
 [Entitic      
 
 mass]     
 
 Erythrocy  31.8 -   g/dl  Normal  No   Aug 3 
 
 te mean   35.4    inform2017 2:04
 
 corpuscul     on in    AM
 
 ar      source  
 
 hemoglobi     data 
 
 n      
 
 concentra     
 
 tion      
 
 [Mass/vol     
 
 ume] by      
 
 Automated     
 
  count     
 
 Erythrocy  82.2 -   fl  Normal  No   Aug 3 
 
 te mean   97.8    informati   2:04
 
 corpuscul     on in    AM
 
 ar volume     source  
 
  [Entitic     data 
 
  volume]      
 
 by      
 
 Automated     
 
  count     
 
 Monocytes  0.1 - 1.0  K/mm3  Normal  No   Aug 3 
 
       informati   2:04
 
 [#/volume     on in    AM
 
 ] in      source  
 
 Blood by      data 
 
 Automated     
 
  count     
 
 Monocytes  1.7 - 9.3  %  Normal  No   Aug 3 
 
 /100      informati  2017 2:04
 
 leukocyte     on in    AM
 
 s in      source  
 
 Blood by      data 
 
 Automated     
 
  count     
 
 Platelet   7.4 -   fl  Low  No   Aug 3 
 
 mean   10.4    informati   2:04
 
 volume      on in    AM
 
 [Entitic      source  
 
 volume]      data 
 
 in Blood      
 
 by      
 
 Automated     
 
  count     
 
 Platelets  142 - 424  K/mm3  Normal  No   Aug 3 
 
       informati   2:04
 
 [#/volume     on in    AM
 
 ] in      source  
 
 Blood     data 
 
 Erythrocy  4.2 - 5.4  M/mm3  Normal  No   Aug 3 
 
 janis      informati  2017 2:04
 
 [#/volume     on in    AM
 
 ] in      source  
 
 Amniotic      data 
 
 fluid     
 
 Erythrocy  11.5 -   %  Normal  No   Aug 3 
 
 te   17.5    informati   2:04
 
 distribut     on in    AM
 
 ion width     source  
 
  [Entitic     data 
 
  volume]      
 
 by      
 
 Automated     
 
  count     
 
 Leukocyte  4.8 -   K/MM3  Normal  No   Aug 3 
 
 s   10.8    informati   2:04
 
 [#/volume     on in    AM
 
 ] in      source  
 
 Blood     data 
 
 
 
 
 CHLAMYDIA AND GONORRHEA TESTING
 
 
 
 
 Observa  Value  Referen  Units  Interpr  Notes  Date
 
 tion   ce    etation  
 
   Range    
 
 COLLECT  PT/D.   No   No   No   No    
 
 OR  BRADFOR  informa  informa  informa  informa   
 
  D RN  tion in  tion in  tion in  tion in  11:35 
 
    source   source   source   source  AM
 
    data   data   data   data 
 
 ETHNICI  WHITE,   No   No   No   No    
 
 TY  NON-HIS  informa  informa  informa  informa   
 
  PANIC  tion in  tion in  tion in  tion in  11:35 
 
    source   source   source   source  AM
 
    data   data   data   data 
 
 KIT   04-30-2  No   No   No   No    
 
 EXPIRAT  014  informa  informa  informa  informa  2013 
 
 ION    tion in  tion in  tion in  tion in  11:35 
 
 DATE    source   source   source   source  AM
 
    data   data   data   data 
 
 SYMPTOM  NO  No   No   No   No    
 
 S   informa  informa  informa  informa  2013 
 
   tion in  tion in  tion in  tion in  11:35 
 
    source   source   source   source  AM
 
    data   data   data   data 
 
 REASON   REVISIT  No   No   No   No    
 
 FOR   /ANNUAL  informa  informa  informa  informa  2013 
 
 REQUEST   FAMILY  tion in  tion in  tion in  tion in  11:35 
 
      source   source   source   source  AM
 
  PLANNIN   data   data   data   data 
 
  G VISIT     
 
 SPECIME  URINE  No   No   No   No    
 
 N    informa  informa  informa  informa  2013 
 
 SOURCE   tion in  tion in  tion in  tion in  11:35 
 
    source   source   source   source  AM
 
    data   data   data   data 
 
 PREGNAN  NO  No   No   No   No    
 
 T   informa  informa  informa  informa  2013 
 
   tion in  tion in  tion in  tion in  11:35 
 
    source   source   source   source  AM
 
    data   data   data   data 
 
 CHART   NA  No   No   No   No    
 
 NUMBER   informa  informa  informa  informa  2013 
 
   tion in  tion in  tion in  tion in  11:35 
 
    source   source   source   source  AM
 
    data   data   data   data 
 
 Chlamyd  NEGATIV  No   No   No   NEGATIV   
 
 ia   E  informa  informa  informa  E   2013 
 
 trachom   tion in  tion in  tion in  RESULT=  11:35 
 
 atis     source   source   source   WITHIN  AM
 
 rRNA     data   data   data   NORMAL 
 
 [Presen        
 
 ce] in       LIMITSP 
 
 Unspeci      OSITIVE 
 
 fied         
 
 specime      RESULT= 
 
 n by         
 
 Probe &      ABNORMA 
 
  target      LEQUIVO 
 
        ECTOR  
 
 amplifi      RESULT= 
 
 cation         
 
 method      INDETER 
 
      MINATEU 
 
      NSATISF 
 
      ACTORY  
 
      RESULT= 
 
        
 
      INVALID 
 
 Neisser  NEGATIV  No   No   No   NEGATIV   
 
 ia   E  informa  informa  informa  E   2013 
 
 gonorrh   tion in  tion in  tion in  RESULT=  11:35 
 
 oeae     source   source   source   WITHIN  AM
 
 rRNA     data   data   data   NORMAL 
 
 [Presen        
 
 ce] in       LIMITSP 
 
 Unspeci      OSITIVE 
 
 fied         
 
 specime      RESULT= 
 
 n by         
 
 Probe &      ABNORMA 
 
  target      LEQUIVO 
 
        ECTOR  
 
 amplifi      RESULT= 
 
 cation         
 
 method      INDETER 
 
      MINATEU 
 
      NSATISF 
 
      ACTORY  
 
      RESULT= 
 
        
 
      INVALID 
 
      THE  
 
      APTIMA  
 
      COMBO 2 
 
       ASSAY  
 
      IS NOT  
 
      INTENDE 
 
      D FOR  
 
      THE  
 
      EVALUAT 
 
      ION OF  
 
      SUSPECT 
 
      EDSEXUA 
 
      L ABUSE 
 
       OR FOR 
 
       OTHER  
 
      MEDICO- 
 
      LEGAL  
 
      INDICAT 
 
      IONS.   
 
      FOR  
 
      THOSE  
 
      PATIENT 
 
      S  
 
      FORWHOM 
 
       A  
 
      FALSE  
 
      POSITIV 
 
      E  
 
      RESULT  
 
      MAY  
 
      HAVE  
 
      ADVERSE 
 
        
 
      PSYCHO- 
 
      SOCIAL  
 
      IMPACT, 
 
       THE  
 
      CDCRECO 
 
      MMENDS  
 
      RETESTI 
 
      NG. 
 
 REMARKS  MOD REP  No   No   No   \.br\Th   
 
   TO   informa  informa  informa  is   2013 
 
  CHANGE   tion in  tion in  tion in  report   11:35 
 
      source   source   source  contain  AM
 
  FROM    data   data   data  s  
 
  96      patient 
 
  TO        
 
  96     informa 
 
   PER      hannah REYNOSOO     that  
 
  N CO HD     must be 
 
          
 
       protect 
 
  3 TN     ed in  
 
      accorda 
 
      nce  
 
      with  
 
      the  
 
      Health  
 
      [facility] 
 
      ce  
 
      Portabi 
 
      lity  
 
      and  
 
      Account 
 
      ability 
 
       Act. 
 
 
 
 
 CHLAMYDIA AND GONORRHEA TESTING
 
 
 
 
 Observa  Value  Referen  Units  Interpr  Notes  Date
 
 tion   ce    etation  
 
   Range    
 
 COLLECT  PT/D.   No   No   No   No    
 
 OR  BRADFOR  informa  informa  informa  informa  2013 
 
  D RN  tion in  tion in  tion in  tion in  11:35 
 
    source   source   source   source  AM
 
    data   data   data   data 
 
 ETHNICI  WHITE,   No   No   No   No    
 
 TY  NON-HIS  informa  informa  informa  informa  2013 
 
  PANIC  tion in  tion in  tion in  tion in  11:35 
 
    source   source   source   source  AM
 
    data   data   data   data 
 
 KIT   -30-2  No   No   No   No    
 
 EXPIRAT  014  informa  informa  informa  informa  2013 
 
 ION    tion in  tion in  tion in  tion in  11:35 
 
 DATE    source   source   source   source  AM
 
    data   data   data   data 
 
 SYMPTOM  NO  No   No   No   No    
 
 S   informa  informa  informa  informa  2013 
 
   tion in  tion in  tion in  tion in  11:35 
 
    source   source   source   source  AM
 
    data   data   data   data 
 
 REASON   REVISIT  No   No   No   No    
 
 FOR   /ANNUAL  informa  informa  informa  informa  2013 
 
 REQUEST   FAMILY  tion in  tion in  tion in  tion in  11:35 
 
      source   source   source   source  AM
 
  PLANNIN   data   data   data   data 
 
  G VISIT     
 
 SPECIME  URINE  No   No   No   No    
 
 N    informa  informa  informa  informa  2013 
 
 SOURCE   tion in  tion in  tion in  tion in  11:35 
 
    source   source   source   source  AM
 
    data   data   data   data 
 
 PREGNAN  NO  No   No   No   No    
 
 T   informa  informa  informa  informa  2013 
 
   tion in  tion in  tion in  tion in  11:35 
 
    source   source   source   source  AM
 
    data   data   data   data 
 
 CHART   NA  No   No   No   No    
 
 NUMBER   informa  informa  informa  informa  2013 
 
   tion in  tion in  tion in  tion in  11:35 
 
    source   source   source   source  AM
 
    data   data   data   data 
 
 Chlamyd  Pending  No   No   No   No    
 
 ia    informa  informa  informa  informa  2013 
 
 trachom   tion in  tion in  tion in  tion in  11:35 
 
 atis     source   source   source   source  AM
 
 rRNA     data   data   data   data 
 
 [Presen      
 
 ce] in       
 
 Unspeci      
 
 fied       
 
 specime      
 
 n by       
 
 Probe &      
 
  target      
 
        
 
 amplifi      
 
 cation       
 
 method      
 
 Neisser  Pending  No   No   No   No    
 
 ia    informa  informa  informa  informa  2013 
 
 gonorrh   tion in  tion in  tion in  tion in  11:35 
 
 oeae     source   source   source   source  AM
 
 rRNA     data   data   data   data 
 
 [Presen      
 
 ce] in       
 
 Unspeci      
 
 fied       
 
 specime      
 
 n by       
 
 Probe &      
 
  target      
 
        
 
 amplifi      
 
 cation       
 
 method      
 
 REMARKS  MOD REP  No   No   No   \.br\ 
 
   TO   informa  informa  informa  is   2013 
 
  CHANGE   tion in  tion in  tion in  report   11:35 
 
      source   source   source  contain  AM
 
  FROM    data   data   data  s  
 
  96      patient 
 
  TO        
 
  96     informa 
 
   PER      tion  
 
  HARRISO     that  
 
  N CO HD     must be 
 
          
 
       protect 
 
  3 TN     ed in  
 
      [facility]  
 
      with  
 
      the  
 
      Health  
 
      [facility] 
 
      ce  
 
      Portabi 
 
      lity  
 
      and  
 
      Account 
 
      ability 
 
       Act. 
 
 
 
 
 CHLAMYDIA AND GONORRHEA TESTING
 
 
 
 
 Observa  Value  Referen  Units  Interpr  Notes  Date
 
 tion   ce    etation  
 
   Range    
 
 COLLECT  PT/D.   No   No   No   No    
 
 OR  BRADFOR  informa  informa  informa  informa  2013 
 
  D RN  tion in  tion in  tion in  tion in  11:35 
 
    source   source   source   source  AM
 
    data   data   data   data 
 
 ETHNICI  WHITE,   No   No   No   No    
 
 TY  NON-HIS  informa  informa  informa  informa  2013 
 
  PANIC  tion in  tion in  tion in  tion in  11:35 
 
    source   source   source   source  AM
 
    data   data   data   data 
 
 KIT   04-30-2  No   No   No   No    
 
 EXPIRAT  014  informa  informa  informa  informa  2013 
 
 ION    tion in  tion in  tion in  tion in  11:35 
 
 DATE    source   source   source   source  AM
 
    data   data   data   data 
 
 SYMPTOM  NO  No   No   No   No    
 
 S   informa  informa  informa  informa  2013 
 
   tion in  tion in  tion in  tion in  11:35 
 
    source   source   source   source  AM
 
    data   data   data   data 
 
 REASON   REVISIT  No   No   No   No    
 
 FOR   /ANNUAL  informa  informa  informa  informa  2013 
 
 REQUEST   FAMILY  tion in  tion in  tion in  tion in  11:35 
 
      source   source   source   source  AM
 
  PLANNIN   data   data   data   data 
 
  G VISIT     
 
 SPECIME  URINE  No   No   No   No    
 
 N    informa  informa  informa  informa  2013 
 
 SOURCE   tion in  tion in  tion in  tion in  11:35 
 
    source   source   source   source  AM
 
    data   data   data   data 
 
 PREGNAN  NO  No   No   No   No    
 
 T   informa  informa  informa  informa  2013 
 
   tion in  tion in  tion in  tion in  11:35 
 
    source   source   source   source  AM
 
    data   data   data   data 
 
 CHART   NA  No   No   No   No    
 
 NUMBER   informa  informa  informa  informa  2013 
 
   tion in  tion in  tion in  tion in  11:35 
 
    source   source   source   source  AM
 
    data   data   data   data 
 
 Chlamyd  Pending  No   No   No   No    
 
 ia    informa  informa  informa  informa  2013 
 
 trachom   tion in  tion in  tion in  tion in  11:35 
 
 atis     source   source   source   source  AM
 
 rRNA     data   data   data   data 
 
 [Presen      
 
 ce] in       
 
 Unspeci      
 
 fied       
 
 specime      
 
 n by       
 
 Probe &      
 
  target      
 
        
 
 amplifi      
 
 cation       
 
 method      
 
 Neisser  Pending  No   No   No   \.br\ 
 
 ia    informa  informa  informa  is   2013 
 
 gonorrh   tion in  tion in  tion in  report   11:35 
 
 oeae     source   source   source  contain  AM
 
 rRNA     data   data   data  s  
 
 [Presen      patient 
 
 ce] in         
 
 Unspeci      informa 
 
 fied       tion  
 
 specime      that  
 
 n by       must be 
 
 Probe &        
 
  target      protect 
 
        ed in  
 
 amplifi      accorda 
 
 cation       nce  
 
 method      with  
 
      the  
 
      Health  
 
      [facility] 
 
      ce  
 
      Portabi 
 
      lity  
 
      and  
 
      Account 
 
      ability 
 
       Act. 
 
 
 
 
 CHLAMYDIA AND GONORRHEA TESTING
 
 
 
 
 Observa  Value  Referen  Units  Interpr  Notes  Date
 
 tion   ce    etation  
 
   Range    
 
 COLLECT  NA  No   No   No   No   Oct 30 
 
 OR   informa  informa  informa  informa  2012 
 
   tion in  tion in  tion in  tion in  4:30 PM
 
    source   source   source   source 
 
    data   data   data   data 
 
 ETHNICI  WHITE,   No   No   No   No   Oct 30 
 
 TY  NON-HIS  informa  informa  informa  informa  2012 
 
  PANIC  tion in  tion in  tion in  tion in  4:30 PM
 
    source   source   source   source 
 
    data   data   data   data 
 
 KIT   1-31-13  No   No   No   No   Oct 30 
 
 EXPIRAT   informa  informa  informa  informa  2012 
 
 ION    tion in  tion in  tion in  tion in  4:30 PM
 
 DATE    source   source   source   source 
 
    data   data   data   data 
 
 SYMPTOM  NO  No   No   No   No   Oct 30 
 
 S   informa  informa  informa  informa  2012 
 
   tion in  tion in  tion in  tion in  4:30 PM
 
    source   source   source   source 
 
    data   data   data   data 
 
 REASON   REVISIT  No   No   No   No   Oct 30 
 
 FOR   /ANNUAL  informa  informa  informa  informa  2012 
 
 REQUEST   FAMILY  tion in  tion in  tion in  tion in  4:30 PM
 
      source   source   source   source 
 
  PLANNIN   data   data   data   data 
 
  G VISIT     
 
 SPECIME  URINE  No   No   No   No   Oct 30 
 
 N    informa  informa  informa  informa  2012 
 
 SOURCE   tion in  tion in  tion in  tion in  4:30 PM
 
    source   source   source   source 
 
    data   data   data   data 
 
 PREGNAN  NO  No   No   No   No   Oct 30 
 
 T   informa  informa  informa  informa  2012 
 
   tion in  tion in  tion in  tion in  4:30 PM
 
    source   source   source   source 
 
    data   data   data   data 
 
 CHART   NA  No   No   No   No   Oct 30 
 
 NUMBER   informa  informa  informa  informa  2012 
 
   tion in  tion in  tion in  tion in  4:30 PM
 
    source   source   source   source 
 
    data   data   data   data 
 
 Chlamyd  NEGATIV  No   No   No   NEGATIV  Oct 30 
 
 ia   E  informa  informa  informa  E   2012 
 
 trachom   tion in  tion in  tion in  RESULT=  4:30 PM
 
 atis     source   source   source   WITHIN 
 
 rRNA     data   data   data   NORMAL 
 
 [Presen        
 
 ce] in       LIMITSP 
 
 Unspeci      OSITIVE 
 
 fied         
 
 specime      RESULT= 
 
 n by         
 
 Probe &      ABNORMA 
 
  target      LEQUIVO 
 
        ECTOR  
 
 amplifi      RESULT= 
 
 cation         
 
 method      INDETER 
 
      MINATEU 
 
      NSATISF 
 
      ACTORY  
 
      RESULT= 
 
        
 
      INVALID 
 
 Neisser  NEGATIV  No   No   No   NEGATIV  Oct 30 
 
 ia   E  informa  informa  informa  E   2012 
 
 gonorrh   tion in  tion in  tion in  RESULT=  4:30 PM
 
 oeae     source   source   source   WITHIN 
 
 rRNA     data   data   data   NORMAL 
 
 [Presen        
 
 ce] in       LIMITSP 
 
 Unspeci      OSITIVE 
 
 fied         
 
 specime      RESULT= 
 
 n by         
 
 Probe &      ABNORMA 
 
  target      LEQUIVO 
 
        ECTOR  
 
 amplifi      RESULT= 
 
 cation         
 
 method      INDETER 
 
      MINATEU 
 
      NSATISF 
 
      ACTORY  
 
      RESULT= 
 
        
 
      INVALID 
 
      THE  
 
      APTIMA  
 
      COMBO 2 
 
       ASSAY  
 
      IS NOT  
 
      INTENDE 
 
      D FOR  
 
      THE  
 
      EVALUAT 
 
      ION OF  
 
      SUSPECT 
 
      EDSEXUA 
 
      L ABUSE 
 
       OR FOR 
 
       OTHER  
 
      MEDICO- 
 
      LEGAL  
 
      INDICAT 
 
      IONS.   
 
      FOR  
 
      THOSE  
 
      PATIENT 
 
      S  
 
      FORWHOM 
 
       A  
 
      FALSE  
 
      POSITIV 
 
      E  
 
      RESULT  
 
      MAY  
 
      HAVE  
 
      ADVERSE 
 
        
 
      PSYCHO- 
 
      SOCIAL  
 
      IMPACT, 
 
       THE  
 
      CDCRECO 
 
      MMENDS  
 
      RETESTI 
 
      NG.\.br 
 
      \This  
 
      report  
 
      contain 
 
      s  
 
      patient 
 
        
 
      informa 
 
      tion  
 
      that  
 
      must be 
 
        
 
      protect 
 
      ed in  
 
      [place] 
 
      nce  
 
      with  
 
      the  
 
      Health  
 
      [facility] 
 
      ce  
 
      Portphyllis 
 
      litjuan  
 
      and  
 
      Account 
 
      ability 
 
       Act. 
 
 
 
 
 CHLAMYDIA AND GONORRHEA TESTING
 
 
 
 
 Observa  Value  Referen  Units  Interpr  Notes  Date
 
 tion   ce    etation  
 
   Range    
 
 COLLECT  NA  No   No   No   No   Oct 30 
 
 OR   informa  informa  informa  informa  2012 
 
   tion in  tion in  tion in  tion in  4:30 PM
 
    source   source   source   source 
 
    data   data   data   data 
 
 ETHNICI  WHITE,   No   No   No   No   Oct 30 
 
 TY  NON-HIS  informa  informa  informa  informa  2012 
 
  PANIC  tion in  tion in  tion in  tion in  4:30 PM
 
    source   source   source   source 
 
    data   data   data   data 
 
 KIT   1-31-13  No   No   No   No   Oct 30 
 
 EXPIRAT   informa  informa  informa  informa  2012 
 
 ION    tion in  tion in  tion in  tion in  4:30 PM
 
 DATE    source   source   source   source 
 
    data   data   data   data 
 
 SYMPTOM  NO  No   No   No   No   Oct 30 
 
 S   informa  informa  informa  informa  2012 
 
   tion in  tion in  tion in  tion in  4:30 PM
 
    source   source   source   source 
 
    data   data   data   data 
 
 REASON   REVISIT  No   No   No   No   Oct 30 
 
 FOR   /ANNUAL  informa  informa  informa  informa  2012 
 
 REQUEST   FAMILY  tion in  tion in  tion in  tion in  4:30 PM
 
      source   source   source   source 
 
  PLANNIN   data   data   data   data 
 
  G VISIT     
 
 SPECIME  URINE  No   No   No   No   Oct 30 
 
 N    informa  informa  informa  informa  2012 
 
 SOURCE   tion in  tion in  tion in  tion in  4:30 PM
 
    source   source   source   source 
 
    data   data   data   data 
 
 PREGNAN  NO  No   No   No   No   Oct 30 
 
 T   informa  informa  informa  informa  2012 
 
   tion in  tion in  tion in  tion in  4:30 PM
 
    source   source   source   source 
 
    data   data   data   data 
 
 CHART   NA  No   No   No   No   Oct 30 
 
 NUMBER   informa  informa  informa  informa  2012 
 
   tion in  tion in  tion in  tion in  4:30 PM
 
    source   source   source   source 
 
    data   data   data   data 
 
 Chlamyd  Pending  No   No   No   No   Oct 30 
 
 ia    informa  informa  informa  informa  2012 
 
 trachom   tion in  tion in  tion in  tion in  4:30 PM
 
 atis     source   source   source   source 
 
 rRNA     data   data   data   data 
 
 [Presen      
 
 ce] in       
 
 Unspeci      
 
 fied       
 
 specime      
 
 n by       
 
 Probe &      
 
  target      
 
        
 
 amplifi      
 
 cation       
 
 method      
 
 Neisser  Pending  No   No   No   \.br\Th  Oct 30 
 
 ia    informa  informa  informa  is   2012 
 
 gonorrh   tion in  tion in  tion in  report   4:30 PM
 
 oeae     source   source   source  contain 
 
 rRNA     data   data   data  s  
 
 [Presen      patient 
 
 ce] in         
 
 Unspeci      informa 
 
 fied       tion  
 
 specime      that  
 
 n by       must be 
 
 Probe &        
 
  target      protect 
 
        ed in  
 
 amplifi      accorda 
 
 cation       nce  
 
 method      with  
 
      the  
 
      Health  
 
      [facility] 
 
      ce  
 
      Portabi 
 
      lity  
 
      and  
 
      Account 
 
      ability 
 
       Act. 
 
 
 
 
 CHLAMYDIA AND GONORRHEA TESTING
 
 
 
 
 Observa  Value  Referen  Units  Interpr  Notes  Date
 
 tion   ce    etation  
 
   Range    
 
 COLLECT  NA  No   No   No   No   Oct 4 
 
 OR   informa  informa  informa  informa  2011 
 
   tion in  tion in  tion in  tion in  11:23 
 
    source   source   source   source  AM
 
    data   data   data   data 
 
 ETHNICI  WHITE,   No   No   No   No   Oct 4 
 
 TY  NON-HIS  informa  informa  informa  informa  2011 
 
  PANIC  tion in  tion in  tion in  tion in  11:23 
 
    source   source   source   source  AM
 
    data   data   data   data 
 
 KIT   -  No   No   No   No   Oct 4 
 
 EXPIRAT  1  informa  informa  informa  informa  2011 
 
 ION    tion in  tion in  tion in  tion in  11:23 
 
 DATE    source   source   source   source  AM
 
    data   data   data   data 
 
 SYMPTOM  NO  No   No   No   No   Oct 4 
 
 S   informa  informa  informa  informa  2011 
 
   tion in  tion in  tion in  tion in  11:23 
 
    source   source   source   source  AM
 
    data   data   data   data 
 
 REASON   REVISIT  No   No   No   No   Oct 4 
 
 FOR   /ANNUAL  informa  informa  informa  informa  2011 
 
 REQUEST   FAMILY  tion in  tion in  tion in  tion in  11:23 
 
      source   source   source   source  AM
 
  PLANNIN   data   data   data   data 
 
  G VISIT     
 
 SPECIME  URINE  No   No   No   No   Oct 4 
 
 N    informa  informa  informa  informa  2011 
 
 SOURCE   tion in  tion in  tion in  tion in  11:23 
 
    source   source   source   source  AM
 
    data   data   data   data 
 
 PREGNAN  NO  No   No   No   No   Oct 4 
 
 T   informa  informa  informa  informa  2011 
 
   tion in  tion in  tion in  tion in  11:23 
 
    source   source   source   source  AM
 
    data   data   data   data 
 
 CHART   NA  No   No   No   No   Oct 4 
 
 NUMBER   informa  informa  informa  informa  2011 
 
   tion in  tion in  tion in  tion in  11:23 
 
    source   source   source   source  AM
 
    data   data   data   data 
 
 Chlamyd  NEGATIV  No   No   No   NEGATIV  Oct 4 
 
 ia   E  informa  informa  informa  E   2011 
 
 trachom   tion in  tion in  tion in  RESULT=  11:23 
 
 atis     source   source   source   WITHIN  AM
 
 rRNA     data   data   data   NORMAL 
 
 [Presen        
 
 ce] in       LIMITSP 
 
 Unspeci      OSITIVE 
 
 fied         
 
 specime      RESULT= 
 
 n by         
 
 Probe &      ABNORMA 
 
  target      LEQUIVO 
 
        ECTOR  
 
 amplifi      RESULT= 
 
 cation         
 
 method      INDETER 
 
      MINATEU 
 
      NSATISF 
 
      ACTORY  
 
      RESULT= 
 
        
 
      INVALID 
 
 Neisser  NEGATIV  No   No   No   NEGATIV  Oct 4 
 
 ia   E  informa  informa  informa  E   2011 
 
 gonorrh   tion in  tion in  tion in  RESULT=  11:23 
 
 oeae     source   source   source   WITHIN  AM
 
 rRNA     data   data   data   NORMAL 
 
 [Presen        
 
 ce] in       LIMITSP 
 
 Unspeci      OSITIVE 
 
 fied         
 
 specime      RESULT= 
 
 n by         
 
 Probe &      ABNORMA 
 
  target      LEQUIVO 
 
        ECTOR  
 
 amplifi      RESULT= 
 
 cation         
 
 method      INDETER 
 
      MINATEU 
 
      NSATISF 
 
      ACTORY  
 
      RESULT= 
 
        
 
      INVALID 
 
      EFFECTI 
 
      VE  
 
      NOVEMBE 
 
      R 2010:  
 
      THE  
 
      APTIMA  
 
      COMBO 2 
 
        
 
      NUCLEIC 
 
        
 
      ACIDAMP 
 
      LIFICAT 
 
      ION  
 
      ASSAY  
 
      IS NOT  
 
      INTENDE 
 
      D FOR  
 
      THE  
 
      EVALUAT 
 
      ION  
 
      OFSUSPE 
 
      CTED  
 
      SEXUAL  
 
      ABUSE  
 
      OR FOR  
 
      OTHER  
 
      MEDICO- 
 
      LEGAL  
 
      INDICAT 
 
      IONS.FA 
 
      LSE  
 
      POSITIV 
 
      E  
 
      RESULTS 
 
       ARE  
 
      POSSIBL 
 
      E.\.br\ 
 
      This  
 
      report  
 
      contain 
 
      s  
 
      patient 
 
        
 
      informa 
 
      tion  
 
      that  
 
      must be 
 
        
 
      protect 
 
      ed in  
 
      accorda 
 
      nce  
 
      with  
 
      the  
 
      Health  
 
      [facility] 
 
      ce  
 
      Portabi 
 
      lity  
 
      and  
 
      Account 
 
      ability 
 
       Act. 
 
 
 
 
 CHLAMYDIA AND GONORRHEA TESTING
 
 
 
 
 Observa  Value  Referen  Units  Interpr  Notes  Date
 
 tion   ce    etation  
 
   Range    
 
 COLLECT  NA  No   No   No   No   Oct 4 
 
 OR   informa  informa  informa  informa   
 
   tion in  tion in  tion in  tion in  11:23 
 
    source   source   source   source  AM
 
    data   data   data   data 
 
 ETHNICI  WHITE,   No   No   No   No   Oct 4 
 
 TY  NON-HIS  informa  informa  informa  informa   
 
  PANIC  tion in  tion in  tion in  tion in  11:23 
 
    source   source   source   source  AM
 
    data   data   data   data 
 
 KIT   --1  No   No   No   No   Oct 4 
 
 EXPIRAT  1  informa  informa  informa  informa  2011 
 
 ION    tion in  tion in  tion in  tion in  11:23 
 
 DATE    source   source   source   source  AM
 
    data   data   data   data 
 
 SYMPTOM  NO  No   No   No   No   Oct 4 
 
 S   informa  informa  informa  informa  2011 
 
   tion in  tion in  tion in  tion in  11:23 
 
    source   source   source   source  AM
 
    data   data   data   data 
 
 REASON   REVISIT  No   No   No   No   Oct 4 
 
 FOR   /ANNUAL  informa  informa  informa  informa  2011 
 
 REQUEST   FAMILY  tion in  tion in  tion in  tion in  11:23 
 
      source   source   source   source  AM
 
  PLANNIN   data   data   data   data 
 
  G VISIT     
 
 SPECIME  URINE  No   No   No   No   Oct 4 
 
 N    informa  informa  informa  informa  2011 
 
 SOURCE   tion in  tion in  tion in  tion in  11:23 
 
    source   source   source   source  AM
 
    data   data   data   data 
 
 PREGNAN  NO  No   No   No   No   Oct 4 
 
 T   informa  informa  informa  informa  2011 
 
   tion in  tion in  tion in  tion in  11:23 
 
    source   source   source   source  AM
 
    data   data   data   data 
 
 CHART   NA  No   No   No   No   Oct 4 
 
 NUMBER   informa  informa  informa  informa  2011 
 
   tion in  tion in  tion in  tion in  11:23 
 
    source   source   source   source  AM
 
    data   data   data   data 
 
 Chlamyd  NEGATIV  No   No   No   NEGATIV  Oct 4 
 
 ia   E  informa  informa  informa  E   2011 
 
 trachom   tion in  tion in  tion in  RESULT=  11:23 
 
 atis     source   source   source   WITHIN  AM
 
 rRNA     data   data   data   NORMAL 
 
 [Presen        
 
 ce] in       LIMITSP 
 
 Unspeci      OSITIVE 
 
 fied         
 
 specime      RESULT= 
 
 n by         
 
 Probe &      ABNORMA 
 
  target      LEQUIVO 
 
        ECTOR  
 
 amplifi      RESULT= 
 
 cation         
 
 method      INDETER 
 
      MINATEU 
 
      NSATISF 
 
      ACTORY  
 
      RESULT= 
 
        
 
      INVALID 
 
 Neisser  NEGATIV  No   No   No   NEGATIV  Oct 4 
 
 ia   E  informa  informa  informa  E    
 
 gonorrh   tion in  tion in  tion in  RESULT=  11:23 
 
 oeae     source   source   source   WITHIN  AM
 
 rRNA     data   data   data   NORMAL 
 
 [Presen        
 
 ce] in       LIMITSP 
 
 Unspeci      OSITIVE 
 
 fied         
 
 specime      RESULT= 
 
 n by         
 
 Probe &      ABNORMA 
 
  target      LEQUIVO 
 
        ECTOR  
 
 amplifi      RESULT= 
 
 cation         
 
 method      INDETER 
 
      MINATEU 
 
      NSATISF 
 
      ACTORY  
 
      RESULT= 
 
        
 
      INVALID 
 
      EFFECTI 
 
      VE  
 
      NOVEMBE 
 
      R [id number]:  
 
      THE  
 
      APTIMA  
 
      COMBO 2 
 
        
 
      NUCLEIC 
 
        
 
      ACIDAMP 
 
      LIFICAT 
 
      ION  
 
      ASSAY  
 
      IS NOT  
 
      INTENDE 
 
      D FOR  
 
      THE  
 
      EVALUAT 
 
      ION  
 
      OFSUSPE 
 
      CTED  
 
      SEXUAL  
 
      ABUSE  
 
      OR FOR  
 
      OTHER  
 
      MEDICO- 
 
      LEGAL  
 
      INDICAT 
 
      IONS.FA 
 
      LSE  
 
      POSITIV 
 
      E  
 
      RESULTS 
 
       ARE  
 
      POSSIBL 
 
      E.\.br\ 
 
      This  
 
      report  
 
      contain 
 
      s  
 
      patient 
 
        
 
      informa 
 
      tion  
 
      that  
 
      must be 
 
        
 
      protect 
 
      ed in  
 
      accorda 
 
      nce  
 
      with  
 
      the  
 
      Health  
 
      [facility] 
 
      ce  
 
      Portabi 
 
      lity  
 
      and  
 
      Account 
 
      ability 
 
       Act.

## 2017-11-19 NOTE — EXTERNAL MEDICAL SUMMARY RPT
[facility] [employer] Immunization CCD
 Created on: 2017
 
TROY JAMMIE
External Reference #: [id number]
: 89
Sex: Female
 
Demographics
 
 
 
 Address  [address]  [address]
 
 Preferred Language  English
 
 Marital Status  Unknown
 
 [employer] Affiliation  Unknown
 
 Race  Unknown
 
 Ethnic Group  Unknown
 
 
Author
 
 
 
 Author            ,            FLORIN
 
 Organization  [facility]
 
 Address  Unknown
 
 Phone  [email or web address]
 
                                                                
 
Immunization
                      
 
 
 Name  Date  Rout  CVX   Reac  Dose  Comm  Prov  Is   Faci
 
          e           tion        ent   ider  Refu  lity
 
       Give                                      sed       
 
       n                                                   
 
                                                           
 
                                                   
 
                           
 
               
 
 HPV4  03-0        62          999   Hist  H149  No    H149
 
    5-20                          oric                  
 
 (Gar  08                            al                   
 
 dasi                                Info                  
 
 l)                                  rmat                  
 
                              ion       
 
                           -    
 
                      Sour   
 
                      ce    
 
       Unsp   
 
       ecif   
 
       ied    
 
              
 
              
 
              
 
              
 
           
 
 HPV4  10-1        62          999   Hist  H149  No    H149
 
    0-20                          oric                  
 
 (Gar  07                            al                   
 
 dasi                                Info                  
 
 l)                                  rmat                  
 
                              ion       
 
                           -    
 
                      Sour   
 
                      ce    
 
       Unsp   
 
       ecif   
 
       ied    
 
              
 
              
 
              
 
              
 
           
 
 Tdap  07-2        115         999   Hist  H149  No    H149
 
 ,   7-20                          oric                  
 
 Adso  07                            al                   
 
 rbed                                Info                  
 
                                     rmat                  
 
                                ion       
 
                           -    
 
                      Sour   
 
                    ce    
 
       Unsp   
 
       ecif   
 
       ied    
 
              
 
              
 
              
 
              
 
           
 
 HPV4  07-2        62          999   Hist  H149  No    H149
 
    7-20                          oric                  
 
 (Gar  07                            al                   
 
 dasi                                Info                  
 
 l)                                  rmat                  
 
                              ion       
 
                           -    
 
                      Sour   
 
                      ce    
 
       Unsp   
 
       ecif   
 
       ied    
 
              
 
              
 
              
 
              
 
           
 
 Meni  07-2        32          999   Hist  H149  No    H149
 
 sara  7-20                          oric                  
 
 occa  07                            al                   
 
 l                                 Info                  
 
 MPSV                                rmat                  
 
 4                            ion       
 
                           -    
 
                      Sour   
 
                      ce    
 
           Unsp   
 
       ecif   
 
       ied    
 
              
 
              
 
              
 
              
 
           
 
 Vari  04-0        21          999   Hist  H149  No    H149
 
 cell  4-20                          oric                  
 
 a     01                            al                   
 
                                     Info                  
 
                                     rmat                  
 
                              ion       
 
                          -    
 
                 Sour   
 
                 ce    
 
       Unsp   
 
       ecif   
 
       ied    
 
              
 
              
 
              
 
              
 
           
 
 MMR   06-2        3           999   Hist  H149  No    H149
 
       3-20                          oric                  
 
       00                            al                   
 
                                     Info                  
 
                                    rmat                  
 
                           ion       
 
                      -    
 
                 Sour   
 
                 ce    
 
       Unsp   
 
       ecif   
 
       ied    
 
              
 
              
 
              
 
              
 
           
 
 DTaP  06-2        107         999   Hist  H149  No    H149
 
 , UF  3-20                          oric                  
 
       00                            al                   
 
                                     Info                  
 
                                     rmat                  
 
                                ion       
 
                         -    
 
                 Sour   
 
                 ce    
 
       Unsp   
 
       ecif   
 
       ied    
 
              
 
              
 
              
 
              
 
           
 
 Matteo  06-2        10          999   Hist  H149  No    H149
 
 o-IP  3-20                          oric                  
 
 V     00                            al                   
 
                                     Info                  
 
                                     rmat                  
 
                              ion       
 
                          -    
 
                 Sour   
 
                 ce    
 
       Unsp   
 
       ecif   
 
       ied    
 
              
 
              
 
              
 
              
 
           
 
 MMR   08-2        3           999   Hist  H149  No    H149
 
       8-19                          oric                  
 
       97                            al                   
 
                                     Info                  
 
                                    rmat                  
 
                           ion       
 
                      -    
 
                 Sour   
 
                 ce    
 
       Unsp   
 
       ecif   
 
       ied    
 
              
 
              
 
              
 
              
 
           
 
 DTP-  08-2        22          999   Hist  H149  No    H149
 
 Hib   8-19                          oric                  
 
       97                            al                   
 
                                     Info                  
 
                                     rmat                  
 
                              ion       
 
                        -    
 
                 Sour   
 
                 ce    
 
       Unsp   
 
       ecif   
 
       ied    
 
              
 
              
 
              
 
              
 
           
 
 Hep   04-0        8           999   Hist  H149  No    H149
 
 B,   4-19                          oric                  
 
 ped/  97                            al                   
 
 adol                                Info                  
 
                                    rmat                  
 
                              ion       
 
                           -    
 
                      Sour   
 
                    ce    
 
       Unsp   
 
       ecif   
 
       ied    
 
              
 
              
 
              
 
              
 
           
 
 DTP-  01-0        22          999   Hist  H149  No    H149
 
 Hib   3-19                          oric                  
 
       97                            al                   
 
                                     Info                  
 
                                     rmat                  
 
                              ion       
 
                        -    
 
                 Sour   
 
                 ce    
 
       Unsp   
 
       ecif   
 
       ied    
 
              
 
              
 
              
 
              
 
           
 
 Matteo  01-0        2           999   Hist  H149  No    H149
 
 o-OP  3-19                          oric                  
 
 V     97                            al                   
 
                                     Info                  
 
                                    rmat                  
 
                              ion       
 
                          -    
 
                 Sour   
 
                 ce    
 
       Unsp   
 
       ecif   
 
       ied    
 
              
 
              
 
              
 
              
 
           
 
 Matteo  09-1        2           999   Hist  H149  No    H149
 
 o-OP  9-19                          oric                  
 
 V     96                            al                   
 
                                     Info                  
 
                                    rmat                  
 
                              ion       
 
                          -    
 
                 Sour   
 
                 ce    
 
       Unsp   
 
       ecif   
 
       ied    
 
              
 
              
 
              
 
              
 
           
 
 DTP-  09-1        22          999   Hist  H149  No    H149
 
 Hib   9-19                          oric                  
 
       96                            al                   
 
                                     Info                  
 
                                     rmat                  
 
                              ion       
 
                        -    
 
                 Sour   
 
                 ce    
 
       Unsp   
 
       ecif   
 
       ied    
 
              
 
              
 
              
 
              
 
           
 
 DTP-  06-1        22          999   Hist  H149  No    H149
 
 Hib   0-19                          oric                  
 
       96                            al                   
 
                                     Info                  
 
                                     rmat                  
 
                              ion       
 
                        -    
 
                 Sour   
 
                 ce    
 
       Unsp   
 
       ecif   
 
       ied    
 
              
 
              
 
              
 
              
 
           
 
 Hep   06-1        45          999   Hist  H149  No    H149
 
 B,   0-19                          oric                  
 
 UF    96                            al                   
 
                                     Info                  
 
                                     rmat                  
 
                              ion       
 
                           -    
 
                 Sour   
 
                 ce    
 
       Unsp   
 
       ecif   
 
       ied    
 
              
 
              
 
              
 
              
 
           
 
 Matteo  06-1        2           999   Hist  H149  No    H149
 
 o-OP  0-19                          oric                  
 
 V     96                            al                   
 
                                     Info                  
 
                                    rmat                  
 
                              ion       
 
                          -    
 
                 Sour   
 
                 ce    
 
       Unsp   
 
       ecif   
 
       ied

## 2017-11-19 NOTE — EXTERNAL MEDICAL SUMMARY RPT
[facility] On[facility]Demand Immunization CCD
 Created on: 2017
 
TROY JAMMIE
External Reference #: [id number]
: 89
Sex: Female
 
Demographics
 
 
 
 Address  [address]  [address]
 
 Preferred Language  English
 
 Marital Status  Unknown
 
 [employer] Affiliation  Unknown
 
 Race  Unknown
 
 Ethnic Group  Unknown
 
 
Author
 
 
 
 Author            ,            FLORIN
 
 Organization  [facility]
 
 Address  Unknown
 
 Phone  [email or web address]
 
                                                                
 
Immunization
                      
 
 
 Name  Date  Rout  CVX   Reac  Dose  Comm  Prov  Is   Faci
 
          e           tion        ent   ider  Refu  lity
 
       Give                                      sed       
 
       n                                                   
 
                                                           
 
                                                   
 
                           
 
               
 
 HPV4  03-0        62          999   Hist  H149  No    H149
 
    5-20                          oric                  
 
 (Gar  08                            al                   
 
 dasi                                Info                  
 
 l)                                  rmat                  
 
                              ion       
 
                           -    
 
                      Sour   
 
                      ce    
 
       Unsp   
 
       ecif   
 
       ied    
 
              
 
              
 
              
 
              
 
           
 
 HPV4  10-1        62          999   Hist  H149  No    H149
 
    0-20                          oric                  
 
 (Gar  07                            al                   
 
 dasi                                Info                  
 
 l)                                  rmat                  
 
                              ion       
 
                           -    
 
                      Sour   
 
                      ce    
 
       Unsp   
 
       ecif   
 
       ied    
 
              
 
              
 
              
 
              
 
           
 
 Tdap  07-2        115         999   Hist  H149  No    H149
 
 ,   7-20                          oric                  
 
 Adso  07                            al                   
 
 rbed                                Info                  
 
                                     rmat                  
 
                                ion       
 
                           -    
 
                      Sour   
 
                    ce    
 
       Unsp   
 
       ecif   
 
       ied    
 
              
 
              
 
              
 
              
 
           
 
 HPV4  07-2        62          999   Hist  H149  No    H149
 
    7-20                          oric                  
 
 (Gar  07                            al                   
 
 dasi                                Info                  
 
 l)                                  rmat                  
 
                              ion       
 
                           -    
 
                      Sour   
 
                      ce    
 
       Unsp   
 
       ecif   
 
       ied    
 
              
 
              
 
              
 
              
 
           
 
 Meni  07-2        32          999   Hist  H149  No    H149
 
 sara  7-20                          oric                  
 
 occa  07                            al                   
 
 l                                 Info                  
 
 MPSV                                rmat                  
 
 4                            ion       
 
                           -    
 
                      Sour   
 
                      ce    
 
           Unsp   
 
       ecif   
 
       ied    
 
              
 
              
 
              
 
              
 
           
 
 Vari  04-0        21          999   Hist  H149  No    H149
 
 cell  4-20                          oric                  
 
 a     01                            al                   
 
                                     Info                  
 
                                     rmat                  
 
                              ion       
 
                          -    
 
                 Sour   
 
                 ce    
 
       Unsp   
 
       ecif   
 
       ied    
 
              
 
              
 
              
 
              
 
           
 
 MMR   06-2        3           999   Hist  H149  No    H149
 
       3-20                          oric                  
 
       00                            al                   
 
                                     Info                  
 
                                    rmat                  
 
                           ion       
 
                      -    
 
                 Sour   
 
                 ce    
 
       Unsp   
 
       ecif   
 
       ied    
 
              
 
              
 
              
 
              
 
           
 
 DTaP  06-2        107         999   Hist  H149  No    H149
 
 , UF  3-20                          oric                  
 
       00                            al                   
 
                                     Info                  
 
                                     rmat                  
 
                                ion       
 
                         -    
 
                 Sour   
 
                 ce    
 
       Unsp   
 
       ecif   
 
       ied    
 
              
 
              
 
              
 
              
 
           
 
 Matteo  06-2        10          999   Hist  H149  No    H149
 
 o-IP  3-20                          oric                  
 
 V     00                            al                   
 
                                     Info                  
 
                                     rmat                  
 
                              ion       
 
                          -    
 
                 Sour   
 
                 ce    
 
       Unsp   
 
       ecif   
 
       ied    
 
              
 
              
 
              
 
              
 
           
 
 MMR   08-2        3           999   Hist  H149  No    H149
 
       8-19                          oric                  
 
       97                            al                   
 
                                     Info                  
 
                                    rmat                  
 
                           ion       
 
                      -    
 
                 Sour   
 
                 ce    
 
       Unsp   
 
       ecif   
 
       ied    
 
              
 
              
 
              
 
              
 
           
 
 DTP-  08-2        22          999   Hist  H149  No    H149
 
 Hib   8-19                          oric                  
 
       97                            al                   
 
                                     Info                  
 
                                     rmat                  
 
                              ion       
 
                        -    
 
                 Sour   
 
                 ce    
 
       Unsp   
 
       ecif   
 
       ied    
 
              
 
              
 
              
 
              
 
           
 
 Hep   04-0        8           999   Hist  H149  No    H149
 
 B,   4-19                          oric                  
 
 ped/  97                            al                   
 
 adol                                Info                  
 
                                    rmat                  
 
                              ion       
 
                           -    
 
                      Sour   
 
                    ce    
 
       Unsp   
 
       ecif   
 
       ied    
 
              
 
              
 
              
 
              
 
           
 
 DTP-  01-0        22          999   Hist  H149  No    H149
 
 Hib   3-19                          oric                  
 
       97                            al                   
 
                                     Info                  
 
                                     rmat                  
 
                              ion       
 
                        -    
 
                 Sour   
 
                 ce    
 
       Unsp   
 
       ecif   
 
       ied    
 
              
 
              
 
              
 
              
 
           
 
 Matteo  01-0        2           999   Hist  H149  No    H149
 
 o-OP  3-19                          oric                  
 
 V     97                            al                   
 
                                     Info                  
 
                                    rmat                  
 
                              ion       
 
                          -    
 
                 Sour   
 
                 ce    
 
       Unsp   
 
       ecif   
 
       ied    
 
              
 
              
 
              
 
              
 
           
 
 Matteo  09-1        2           999   Hist  H149  No    H149
 
 o-OP  9-19                          oric                  
 
 V     96                            al                   
 
                                     Info                  
 
                                    rmat                  
 
                              ion       
 
                          -    
 
                 Sour   
 
                 ce    
 
       Unsp   
 
       ecif   
 
       ied    
 
              
 
              
 
              
 
              
 
           
 
 DTP-  09-1        22          999   Hist  H149  No    H149
 
 Hib   9-19                          oric                  
 
       96                            al                   
 
                                     Info                  
 
                                     rmat                  
 
                              ion       
 
                        -    
 
                 Sour   
 
                 ce    
 
       Unsp   
 
       ecif   
 
       ied    
 
              
 
              
 
              
 
              
 
           
 
 DTP-  06-1        22          999   Hist  H149  No    H149
 
 Hib   0-19                          oric                  
 
       96                            al                   
 
                                     Info                  
 
                                     rmat                  
 
                              ion       
 
                        -    
 
                 Sour   
 
                 ce    
 
       Unsp   
 
       ecif   
 
       ied    
 
              
 
              
 
              
 
              
 
           
 
 Hep   06-1        45          999   Hist  H149  No    H149
 
 B,   0-19                          oric                  
 
 UF    96                            al                   
 
                                     Info                  
 
                                     rmat                  
 
                              ion       
 
                           -    
 
                 Sour   
 
                 ce    
 
       Unsp   
 
       ecif   
 
       ied    
 
              
 
              
 
              
 
              
 
           
 
 Matteo  06-1        2           999   Hist  H149  No    H149
 
 o-OP  0-19                          oric                  
 
 V     96                            al                   
 
                                     Info                  
 
                                    rmat                  
 
                              ion       
 
                          -    
 
                 Sour   
 
                 ce    
 
       Unsp   
 
       ecif   
 
       ied

## 2018-01-08 ENCOUNTER — HOSPITAL ENCOUNTER (OUTPATIENT)
Dept: HOSPITAL 22 - LAB | Age: 23
End: 2018-01-08
Payer: COMMERCIAL

## 2018-01-09 ENCOUNTER — HOSPITAL ENCOUNTER (OUTPATIENT)
Dept: HOSPITAL 22 - LAB | Age: 23
End: 2018-01-09
Payer: COMMERCIAL

## 2018-01-18 ENCOUNTER — HOSPITAL ENCOUNTER (EMERGENCY)
Dept: HOSPITAL 22 - UTC | Age: 23
Discharge: HOME | End: 2018-01-18
Payer: COMMERCIAL

## 2018-01-18 VITALS
DIASTOLIC BLOOD PRESSURE: 86 MMHG | OXYGEN SATURATION: 99 % | SYSTOLIC BLOOD PRESSURE: 142 MMHG | RESPIRATION RATE: 20 BRPM | HEART RATE: 122 BPM | TEMPERATURE: 98.3 F

## 2018-01-18 VITALS — BODY MASS INDEX: 28.3 KG/M2

## 2018-01-18 VITALS
HEART RATE: 125 BPM | DIASTOLIC BLOOD PRESSURE: 83 MMHG | OXYGEN SATURATION: 98 % | TEMPERATURE: 99.14 F | RESPIRATION RATE: 20 BRPM | SYSTOLIC BLOOD PRESSURE: 132 MMHG

## 2018-01-18 DIAGNOSIS — A09: ICD-10-CM

## 2018-01-18 DIAGNOSIS — E86.0: Primary | ICD-10-CM

## 2018-01-18 LAB
ALBUMIN LEVEL: 4.7 GM/DL (ref 3.4–5)
ALBUMIN/GLOB SERPL: 1.2 {RATIO} (ref 1.1–1.8)
ALP ISO SERPL-ACNC: 70 U/L (ref 46–116)
ALT SERPLBLD-CCNC: 32 U/L (ref 12–78)
AMYLASE SERPL-CCNC: 27 U/L (ref 25–125)
ANION GAP SERPL CALC-SCNC: 12.4 MEQ/L (ref 5–15)
AST SERPL QL: 27 U/L (ref 15–37)
BACTERIA URNS QL MICRO: (no result) /LPF
BILIRUBIN,TOTAL: 0.4 MG/DL (ref 0.2–1)
BUN SERPL-MCNC: 10 MG/DL (ref 7–18)
CALCIUM SPEC-MCNC: 9.2 MG/DL (ref 8.5–10.1)
CHLORIDE SPEC-SCNC: 100 MMOL/L (ref 98–107)
CO2 SERPL-SCNC: 27 MMOL/L (ref 21–32)
COLOR UR: YELLOW
CREAT BLD-SCNC: 0.81 MG/DL (ref 0.55–1.02)
CREATININE CLEARANCE ESTIMATED: 122 ML/MIN (ref 0–300)
ESTIMATED GLOMERULAR FILT RATE: 89 ML/MIN (ref 60–?)
GFR (AFRICAN AMERICAN): 108 ML/MIN (ref 60–?)
GLOBULIN SER CALC-MCNC: 3.8 GM/DL (ref 1.3–3.2)
GLUCOSE: 93 MG/DL (ref 74–106)
HCT VFR BLD CALC: 44.6 % (ref 37–47)
HGB BLD-MCNC: 15.1 G/DL (ref 12.2–16.2)
KETONES UR STRIP.AUTO-MCNC: 15 MG/DL
LEUKOCYTE ESTERASE UR QL STRIP: (no result)
LIPASE: 79 U/L (ref 73–393)
MCHC RBC-ENTMCNC: 33.8 G/DL (ref 31.8–35.4)
MCV RBC: 88.3 FL (ref 81–99)
MEAN CORPUSCULAR HEMOGLOBIN: 29.9 PG (ref 27–31.2)
MICRO URNS: (no result)
MUCOUS THREADS URNS QL MICRO: (no result) /LPF
PH UR: 6 [PH] (ref 5–8.5)
PLATELET # BLD: 400 K/MM3 (ref 142–424)
POTASSIUM: 3.4 MMOL/L (ref 3.5–5.1)
PROT SERPL-MCNC: 8.5 GM/DL (ref 6.4–8.2)
RBC # BLD AUTO: 5.04 M/MM3 (ref 4.2–5.4)
SODIUM SPEC-SCNC: 136 MMOL/L (ref 136–145)
SP GR UR: 1.01 (ref 1–1.03)
SQUAMOUS URNS QL MICRO: (no result) #/HPF (ref 0–5)
UROBILINOGEN UR QL: 0.2 EU/DL
WBC # BLD AUTO: 12.6 K/MM3 (ref 4.8–10.8)
WBC # UR: (no result) #/HPF (ref 0–3)

## 2018-01-18 PROCEDURE — 82150 ASSAY OF AMYLASE: CPT

## 2018-01-18 PROCEDURE — 83690 ASSAY OF LIPASE: CPT

## 2018-01-18 PROCEDURE — 87507 IADNA-DNA/RNA PROBE TQ 12-25: CPT

## 2018-01-18 PROCEDURE — 85025 COMPLETE CBC W/AUTO DIFF WBC: CPT

## 2018-01-18 PROCEDURE — 96365 THER/PROPH/DIAG IV INF INIT: CPT

## 2018-01-18 PROCEDURE — 96366 THER/PROPH/DIAG IV INF ADDON: CPT

## 2018-01-18 PROCEDURE — 99282 EMERGENCY DEPT VISIT SF MDM: CPT

## 2018-01-18 PROCEDURE — 80053 COMPREHEN METABOLIC PANEL: CPT

## 2018-01-18 PROCEDURE — 81001 URINALYSIS AUTO W/SCOPE: CPT

## 2018-01-18 PROCEDURE — 99284 EMERGENCY DEPT VISIT MOD MDM: CPT

## 2018-01-18 PROCEDURE — 87804 INFLUENZA ASSAY W/OPTIC: CPT

## 2018-01-31 ENCOUNTER — HOSPITAL ENCOUNTER (OUTPATIENT)
Age: 23
End: 2018-01-31
Payer: COMMERCIAL

## 2018-01-31 DIAGNOSIS — R11.2: Primary | ICD-10-CM

## 2018-01-31 PROCEDURE — 83013 H PYLORI (C-13) BREATH: CPT

## 2018-02-09 ENCOUNTER — HOSPITAL ENCOUNTER (OUTPATIENT)
Dept: HOSPITAL 22 - LAB | Age: 23
End: 2018-02-09
Payer: COMMERCIAL

## 2018-02-09 ENCOUNTER — HOSPITAL ENCOUNTER (EMERGENCY)
Age: 23
Discharge: HOME | End: 2018-02-09
Payer: COMMERCIAL

## 2018-02-09 VITALS
SYSTOLIC BLOOD PRESSURE: 131 MMHG | RESPIRATION RATE: 16 BRPM | DIASTOLIC BLOOD PRESSURE: 85 MMHG | OXYGEN SATURATION: 98 % | HEART RATE: 87 BPM | TEMPERATURE: 99.32 F

## 2018-02-09 VITALS
SYSTOLIC BLOOD PRESSURE: 145 MMHG | RESPIRATION RATE: 20 BRPM | HEART RATE: 104 BPM | TEMPERATURE: 98.96 F | OXYGEN SATURATION: 99 % | DIASTOLIC BLOOD PRESSURE: 92 MMHG

## 2018-02-09 VITALS — BODY MASS INDEX: 29 KG/M2

## 2018-02-09 DIAGNOSIS — R53.83: ICD-10-CM

## 2018-02-09 DIAGNOSIS — E86.0: ICD-10-CM

## 2018-02-09 DIAGNOSIS — R19.7: Primary | ICD-10-CM

## 2018-02-09 DIAGNOSIS — R10.9: Primary | ICD-10-CM

## 2018-02-09 DIAGNOSIS — R10.9: ICD-10-CM

## 2018-02-09 LAB
ALBUMIN LEVEL: 4.6 GM/DL (ref 3.4–5)
ALBUMIN/GLOB SERPL: 1.2 {RATIO} (ref 1.1–1.8)
ALP ISO SERPL-ACNC: 76 U/L (ref 46–116)
ALT SERPLBLD-CCNC: 53 U/L (ref 12–78)
ANION GAP SERPL CALC-SCNC: 13.8 MEQ/L (ref 5–15)
AST SERPL QL: 30 U/L (ref 15–37)
BILIRUBIN,TOTAL: 0.3 MG/DL (ref 0.2–1)
BUN SERPL-MCNC: 11 MG/DL (ref 7–18)
CALCIUM SPEC-MCNC: 9.4 MG/DL (ref 8.5–10.1)
CHLORIDE SPEC-SCNC: 105 MMOL/L (ref 98–107)
CO2 SERPL-SCNC: 28 MMOL/L (ref 21–32)
CREAT BLD-SCNC: 0.79 MG/DL (ref 0.55–1.02)
CREATININE CLEARANCE ESTIMATED: 128 ML/MIN (ref 0–300)
ESTIMATED GLOMERULAR FILT RATE: 92 ML/MIN (ref 60–?)
GFR (AFRICAN AMERICAN): 111 ML/MIN (ref 60–?)
GLOBULIN SER CALC-MCNC: 3.9 GM/DL (ref 1.3–3.2)
GLUCOSE: 92 MG/DL (ref 74–106)
HCT VFR BLD CALC: 44.8 % (ref 37–47)
HGB BLD-MCNC: 15.1 G/DL (ref 12.2–16.2)
LIPASE: 106 U/L (ref 73–393)
MCHC RBC-ENTMCNC: 33.7 G/DL (ref 31.8–35.4)
MCV RBC: 88.3 FL (ref 81–99)
MEAN CORPUSCULAR HEMOGLOBIN: 29.8 PG (ref 27–31.2)
PLATELET # BLD: 433 K/MM3 (ref 142–424)
POTASSIUM: 3.8 MMOL/L (ref 3.5–5.1)
PROT SERPL-MCNC: 8.5 GM/DL (ref 6.4–8.2)
RBC # BLD AUTO: 5.07 M/MM3 (ref 4.2–5.4)
SODIUM SPEC-SCNC: 143 MMOL/L (ref 136–145)
WBC # BLD AUTO: 12.2 K/MM3 (ref 4.8–10.8)

## 2018-02-09 PROCEDURE — 74177 CT ABD & PELVIS W/CONTRAST: CPT

## 2018-02-09 PROCEDURE — 84703 CHORIONIC GONADOTROPIN ASSAY: CPT

## 2018-02-09 PROCEDURE — 87086 URINE CULTURE/COLONY COUNT: CPT

## 2018-02-09 PROCEDURE — 76705 ECHO EXAM OF ABDOMEN: CPT

## 2018-02-09 PROCEDURE — 83690 ASSAY OF LIPASE: CPT

## 2018-02-09 PROCEDURE — 80053 COMPREHEN METABOLIC PANEL: CPT

## 2018-02-09 PROCEDURE — 85025 COMPLETE CBC W/AUTO DIFF WBC: CPT

## 2018-02-09 PROCEDURE — 99283 EMERGENCY DEPT VISIT LOW MDM: CPT

## 2018-02-13 ENCOUNTER — HOSPITAL ENCOUNTER (EMERGENCY)
Dept: HOSPITAL 22 - UTC | Age: 23
Discharge: LEFT BEFORE DISCHARGE | End: 2018-02-13
Payer: COMMERCIAL

## 2018-02-13 DIAGNOSIS — Z53.29: Primary | ICD-10-CM

## 2018-02-13 PROCEDURE — 99211 OFF/OP EST MAY X REQ PHY/QHP: CPT

## 2018-02-13 PROCEDURE — 99201: CPT

## 2018-02-15 ENCOUNTER — HOSPITAL ENCOUNTER (OUTPATIENT)
Dept: HOSPITAL 22 - LAB | Age: 23
End: 2018-02-15
Payer: COMMERCIAL

## 2018-02-15 DIAGNOSIS — E55.9: ICD-10-CM

## 2018-02-15 DIAGNOSIS — R53.83: Primary | ICD-10-CM

## 2018-02-15 PROCEDURE — 82652 VIT D 1 25-DIHYDROXY: CPT

## 2018-02-15 PROCEDURE — 85651 RBC SED RATE NONAUTOMATED: CPT

## 2018-02-15 PROCEDURE — 86318 IA INFECTIOUS AGENT ANTIBODY: CPT

## 2018-02-15 PROCEDURE — 86663 EPSTEIN-BARR ANTIBODY: CPT

## 2018-02-16 ENCOUNTER — HOSPITAL ENCOUNTER (OUTPATIENT)
Age: 23
End: 2018-02-16
Payer: COMMERCIAL

## 2018-02-16 DIAGNOSIS — R10.9: Primary | ICD-10-CM

## 2018-02-16 PROCEDURE — 76705 ECHO EXAM OF ABDOMEN: CPT

## 2018-02-17 LAB — EBV EA AB SER IA-ACNC: <9 U/ML (ref 0–8.9)

## 2018-03-13 ENCOUNTER — HOSPITAL ENCOUNTER (OUTPATIENT)
Dept: HOSPITAL 22 - OT | Age: 23
Discharge: HOME | End: 2018-03-13
Payer: COMMERCIAL

## 2018-03-13 DIAGNOSIS — M79.601: ICD-10-CM

## 2018-03-13 DIAGNOSIS — G56.01: ICD-10-CM

## 2018-03-13 DIAGNOSIS — M25.531: Primary | ICD-10-CM

## 2018-03-13 PROCEDURE — 97165 OT EVAL LOW COMPLEX 30 MIN: CPT

## 2018-03-13 PROCEDURE — 97163 PT EVAL HIGH COMPLEX 45 MIN: CPT

## 2019-06-06 ENCOUNTER — HOSPITAL ENCOUNTER (OUTPATIENT)
Age: 24
End: 2019-06-06
Payer: COMMERCIAL

## 2019-06-06 DIAGNOSIS — N92.6: Primary | ICD-10-CM

## 2019-06-06 PROCEDURE — 84703 CHORIONIC GONADOTROPIN ASSAY: CPT

## 2019-08-01 ENCOUNTER — HOSPITAL ENCOUNTER (OUTPATIENT)
Age: 24
End: 2019-08-01
Payer: COMMERCIAL

## 2019-08-01 DIAGNOSIS — R53.83: ICD-10-CM

## 2019-08-01 DIAGNOSIS — N92.6: Primary | ICD-10-CM

## 2019-08-01 LAB
ALBUMIN LEVEL: 4 GM/DL (ref 3.4–5)
ALBUMIN/GLOB SERPL: 1.3 {RATIO} (ref 1.1–1.8)
ALP ISO SERPL-ACNC: 54 U/L (ref 46–116)
ALT SERPLBLD-CCNC: 18 U/L (ref 12–78)
ANION GAP SERPL CALC-SCNC: 13.7 MEQ/L (ref 5–15)
AST SERPL QL: 10 U/L (ref 15–37)
BILIRUBIN,TOTAL: 0.4 MG/DL (ref 0.2–1)
BUN SERPL-MCNC: 11 MG/DL (ref 7–18)
CALCIUM SPEC-MCNC: 9.1 MG/DL (ref 8.5–10.1)
CHLORIDE SPEC-SCNC: 103 MMOL/L (ref 98–107)
CHOLEST SPEC-SCNC: 140 MG/DL (ref 140–200)
CO2 SERPL-SCNC: 28 MMOL/L (ref 21–32)
CREAT BLD-SCNC: 0.79 MG/DL (ref 0.55–1.02)
ESTIMATED GLOMERULAR FILT RATE: 90 ML/MIN (ref 60–?)
GFR (AFRICAN AMERICAN): 109 ML/MIN (ref 60–?)
GLOBULIN SER CALC-MCNC: 3.2 GM/DL (ref 1.3–3.2)
GLUCOSE: 99 MG/DL (ref 74–106)
HCT VFR BLD CALC: 43.7 % (ref 37–47)
HDLC SERPL-MCNC: 41 MG/DL (ref 29–89)
HGB BLD-MCNC: 13.5 G/DL (ref 12.2–16.2)
MCHC RBC-ENTMCNC: 30.9 G/DL (ref 31.8–35.4)
MCV RBC: 92.4 FL (ref 81–99)
MEAN CORPUSCULAR HEMOGLOBIN: 28.6 PG (ref 27–31.2)
PLATELET # BLD: 488 K/MM3 (ref 142–424)
POTASSIUM: 3.7 MMOL/L (ref 3.5–5.1)
PROT SERPL-MCNC: 7.2 GM/DL (ref 6.4–8.2)
RBC # BLD AUTO: 4.73 M/MM3 (ref 4.2–5.4)
SODIUM SPEC-SCNC: 141 MMOL/L (ref 136–145)
T4 (THYROXINE): 8 UG/DL (ref 4.7–13.3)
TRIGLYCERIDES: 74 MG/DL (ref 30–200)
TSH SERPL-ACNC: 1.45 UIU/ML (ref 0.36–3.74)
WBC # BLD AUTO: 7 K/MM3 (ref 4.8–10.8)

## 2019-08-01 PROCEDURE — 82652 VIT D 1 25-DIHYDROXY: CPT

## 2019-08-01 PROCEDURE — 80061 LIPID PANEL: CPT

## 2019-08-01 PROCEDURE — 84436 ASSAY OF TOTAL THYROXINE: CPT

## 2019-08-01 PROCEDURE — 82607 VITAMIN B-12: CPT

## 2019-08-01 PROCEDURE — 84703 CHORIONIC GONADOTROPIN ASSAY: CPT

## 2019-08-01 PROCEDURE — 82746 ASSAY OF FOLIC ACID SERUM: CPT

## 2019-08-01 PROCEDURE — 80053 COMPREHEN METABOLIC PANEL: CPT

## 2019-08-01 PROCEDURE — 84443 ASSAY THYROID STIM HORMONE: CPT

## 2019-08-01 PROCEDURE — 85025 COMPLETE CBC W/AUTO DIFF WBC: CPT

## 2019-08-03 LAB
FOLATE: >20 NG/ML (ref 3–?)
VITAMIN B12: 339 PG/ML (ref 232–1245)

## 2019-08-20 ENCOUNTER — HOSPITAL ENCOUNTER (OUTPATIENT)
Age: 24
End: 2019-08-20
Payer: COMMERCIAL

## 2019-08-20 DIAGNOSIS — O26.841: Primary | ICD-10-CM

## 2019-08-20 DIAGNOSIS — Z34.90: ICD-10-CM

## 2019-08-20 LAB
HCT VFR BLD CALC: 37.3 % (ref 37–47)
HGB BLD-MCNC: 12.3 G/DL (ref 12.2–16.2)
MCHC RBC-ENTMCNC: 32.9 G/DL (ref 31.8–35.4)
MCV RBC: 88.3 FL (ref 81–99)
MEAN CORPUSCULAR HEMOGLOBIN: 29.1 PG (ref 27–31.2)
PLATELET # BLD: 418 K/MM3 (ref 142–424)
RBC # BLD AUTO: 4.22 M/MM3 (ref 4.2–5.4)
WBC # BLD AUTO: 10 K/MM3 (ref 4.8–10.8)

## 2019-08-20 PROCEDURE — 86592 SYPHILIS TEST NON-TREP QUAL: CPT

## 2019-08-20 PROCEDURE — 87380 HEPATITIS DELTA AGENT AG IA: CPT

## 2019-08-20 PROCEDURE — 86762 RUBELLA ANTIBODY: CPT

## 2019-08-20 PROCEDURE — 85025 COMPLETE CBC W/AUTO DIFF WBC: CPT

## 2019-08-20 PROCEDURE — 87340 HEPATITIS B SURFACE AG IA: CPT

## 2019-08-20 PROCEDURE — 36415 COLL VENOUS BLD VENIPUNCTURE: CPT

## 2019-08-20 PROCEDURE — 86850 RBC ANTIBODY SCREEN: CPT

## 2019-08-20 PROCEDURE — G0432 EIA HIV-1/HIV-2 SCREEN: HCPCS

## 2019-08-20 PROCEDURE — 76817 TRANSVAGINAL US OBSTETRIC: CPT

## 2019-08-20 PROCEDURE — 86703 HIV-1/HIV-2 1 RESULT ANTBDY: CPT

## 2019-08-21 LAB
HIV-1 AB CHG: (no result)
HIV-2 AB CHG: (no result)
HIV1 RNA CHG: (no result)

## 2019-08-22 LAB
HCV AB SER IA-ACNC: <0.1 S/CO RATIO (ref 0–0.9)
RUBELLA ANTIBODIES, IGG: 8.8 INDEX

## 2019-09-03 ENCOUNTER — HOSPITAL ENCOUNTER (OUTPATIENT)
Dept: HOSPITAL 22 - ER | Age: 24
Setting detail: OBSERVATION
LOS: 2 days | Discharge: HOME | End: 2019-09-05
Attending: OBSTETRICS & GYNECOLOGY | Admitting: OBSTETRICS & GYNECOLOGY

## 2019-09-03 LAB
ALBUMIN LEVEL: 4.1 GM/DL (ref 3.4–5)
ALBUMIN/GLOB SERPL: 1.1 {RATIO} (ref 1.1–1.8)
ALP ISO SERPL-ACNC: 46 U/L (ref 46–116)
ALT SERPLBLD-CCNC: 16 U/L (ref 12–78)
ANION GAP SERPL CALC-SCNC: 15.3 MEQ/L (ref 5–15)
AST SERPL QL: 12 U/L (ref 15–37)
BILIRUBIN,TOTAL: 0.4 MG/DL (ref 0.2–1)
BUN SERPL-MCNC: 5 MG/DL (ref 7–18)
CALCIUM SPEC-MCNC: 9.2 MG/DL (ref 8.5–10.1)
CHLORIDE SPEC-SCNC: 101 MMOL/L (ref 98–107)
CO2 SERPL-SCNC: 26 MMOL/L (ref 21–32)
COLOR UR: YELLOW
GLOBULIN SER CALC-MCNC: 3.7 GM/DL (ref 1.3–3.2)
GLUCOSE: 73 MG/DL (ref 74–106)
HCT VFR BLD CALC: 40.2 % (ref 37–47)
HGB BLD-MCNC: 13.6 G/DL (ref 12.2–16.2)
KETONES UR STRIP.AUTO-MCNC: (no result) MG/DL
LEUKOCYTE ESTERASE UR QL STRIP: (no result)
MCHC RBC-ENTMCNC: 33.9 G/DL (ref 31.8–35.4)
MCV RBC: 88.5 FL (ref 81–99)
MICRO URNS: (no result)
PH UR: 6 [PH] (ref 5–8.5)
PLATELET # BLD: 401 K/MM3 (ref 142–424)
PROT SERPL-MCNC: 7.8 GM/DL (ref 6.4–8.2)
RBC # BLD AUTO: 4.54 M/MM3 (ref 4.2–5.4)
SODIUM SPEC-SCNC: 139 MMOL/L (ref 136–145)
SP GR UR: 1.01 (ref 1–1.03)
UROBILINOGEN UR QL: 0.2 EU/DL
WBC # BLD AUTO: 11.7 K/MM3 (ref 4.8–10.8)
WBC # UR: (no result) #/HPF (ref 0–3)

## 2019-09-03 PROCEDURE — 87086 URINE CULTURE/COLONY COUNT: CPT

## 2019-09-03 PROCEDURE — 81025 URINE PREGNANCY TEST: CPT

## 2019-09-03 PROCEDURE — 99283 EMERGENCY DEPT VISIT LOW MDM: CPT

## 2019-09-03 PROCEDURE — 36415 COLL VENOUS BLD VENIPUNCTURE: CPT

## 2019-09-03 PROCEDURE — 81001 URINALYSIS AUTO W/SCOPE: CPT

## 2019-09-03 PROCEDURE — 80048 BASIC METABOLIC PNL TOTAL CA: CPT

## 2019-09-03 PROCEDURE — 85025 COMPLETE CBC W/AUTO DIFF WBC: CPT

## 2019-09-03 PROCEDURE — 96365 THER/PROPH/DIAG IV INF INIT: CPT

## 2019-09-03 PROCEDURE — G0378 HOSPITAL OBSERVATION PER HR: HCPCS

## 2019-09-03 PROCEDURE — 80053 COMPREHEN METABOLIC PANEL: CPT

## 2019-09-04 LAB
ANION GAP SERPL CALC-SCNC: 13.6 MEQ/L (ref 5–15)
BUN SERPL-MCNC: 5 MG/DL (ref 7–18)
CALCIUM SPEC-MCNC: 8.6 MG/DL (ref 8.5–10.1)
CHLORIDE SPEC-SCNC: 106 MMOL/L (ref 98–107)
CO2 SERPL-SCNC: 25 MMOL/L (ref 21–32)
GLUCOSE: 68 MG/DL (ref 74–106)
HCT VFR BLD CALC: 38.7 % (ref 37–47)
HGB BLD-MCNC: 12.9 G/DL (ref 12.2–16.2)
MCHC RBC-ENTMCNC: 33.3 G/DL (ref 31.8–35.4)
MCV RBC: 88.6 FL (ref 81–99)
PLATELET # BLD: 373 K/MM3 (ref 142–424)
RBC # BLD AUTO: 4.37 M/MM3 (ref 4.2–5.4)
SODIUM SPEC-SCNC: 141 MMOL/L (ref 136–145)
WBC # BLD AUTO: 11.3 K/MM3 (ref 4.8–10.8)

## 2019-09-24 ENCOUNTER — HOSPITAL ENCOUNTER (OUTPATIENT)
Age: 24
End: 2019-09-24
Payer: COMMERCIAL

## 2019-09-24 DIAGNOSIS — Z34.90: Primary | ICD-10-CM

## 2019-09-24 PROCEDURE — 36415 COLL VENOUS BLD VENIPUNCTURE: CPT

## 2019-11-08 ENCOUNTER — TRANSCRIBE ORDERS (OUTPATIENT)
Dept: WOMENS IMAGING | Facility: HOSPITAL | Age: 24
End: 2019-11-08

## 2019-11-08 DIAGNOSIS — K90.0 CELIAC DISEASE: Primary | ICD-10-CM

## 2019-11-15 ENCOUNTER — OFFICE VISIT (OUTPATIENT)
Dept: OBSTETRICS AND GYNECOLOGY | Facility: HOSPITAL | Age: 24
End: 2019-11-15

## 2019-11-15 ENCOUNTER — HOSPITAL ENCOUNTER (OUTPATIENT)
Dept: WOMENS IMAGING | Facility: HOSPITAL | Age: 24
Discharge: HOME | End: 2019-11-15
Admitting: OBSTETRICS & GYNECOLOGY

## 2019-11-15 VITALS
DIASTOLIC BLOOD PRESSURE: 77 MMHG | WEIGHT: 131 LBS | BODY MASS INDEX: 24.11 KG/M2 | SYSTOLIC BLOOD PRESSURE: 114 MMHG | HEIGHT: 62 IN

## 2019-11-15 DIAGNOSIS — K90.0 CELIAC DISEASE: ICD-10-CM

## 2019-11-15 DIAGNOSIS — Z34.90 PREGNANCY, UNSPECIFIED GESTATIONAL AGE: Primary | ICD-10-CM

## 2019-11-15 PROBLEM — J45.909 ASTHMA: Status: ACTIVE | Noted: 2019-11-15

## 2019-11-15 PROCEDURE — 76811 OB US DETAILED SNGL FETUS: CPT

## 2019-11-15 PROCEDURE — 76805 OB US >/= 14 WKS SNGL FETUS: CPT

## 2019-11-15 PROCEDURE — 76805 OB US >/= 14 WKS SNGL FETUS: CPT | Performed by: OBSTETRICS & GYNECOLOGY

## 2019-11-15 RX ORDER — PRENATAL VIT/IRON FUM/FOLIC AC 27MG-0.8MG
1 TABLET ORAL DAILY
COMMUNITY
Start: 2019-09-24

## 2019-11-15 RX ORDER — PROMETHAZINE HYDROCHLORIDE 12.5 MG/1
12.5 TABLET ORAL EVERY 4 HOURS PRN
Refills: 2 | COMMUNITY
Start: 2019-10-16

## 2019-11-15 RX ORDER — ERGOCALCIFEROL 1.25 MG/1
50000 CAPSULE ORAL
COMMUNITY
Start: 2019-08-13

## 2019-11-15 RX ORDER — PNV NO.95/FERROUS FUM/FOLIC AC 28MG-0.8MG
1 TABLET ORAL DAILY
Refills: 11 | COMMUNITY
Start: 2019-11-07

## 2019-11-15 NOTE — PROGRESS NOTES
Documentation of the ultasound findings, images, and interpretations with be available in the patient's Viewpoint report located in the Chart Review Imaging tab in frents.

## 2019-11-15 NOTE — PROGRESS NOTES
"Pt denies problems with pregnancy.  Next OB appt 11/21/19.  Reports \"DS test at 12 weeks was normal and a boy.\"  "

## 2019-12-07 ENCOUNTER — HOSPITAL ENCOUNTER (OUTPATIENT)
Dept: HOSPITAL 22 - OBOUT | Age: 24
Discharge: HOME | End: 2019-12-07
Payer: COMMERCIAL

## 2019-12-07 VITALS
OXYGEN SATURATION: 100 % | HEART RATE: 88 BPM | TEMPERATURE: 98.3 F | RESPIRATION RATE: 20 BRPM | DIASTOLIC BLOOD PRESSURE: 84 MMHG | SYSTOLIC BLOOD PRESSURE: 136 MMHG

## 2019-12-07 VITALS — BODY MASS INDEX: 24.7 KG/M2

## 2019-12-07 DIAGNOSIS — Z3A.22: ICD-10-CM

## 2019-12-07 DIAGNOSIS — R10.31: ICD-10-CM

## 2019-12-07 DIAGNOSIS — M54.5: ICD-10-CM

## 2019-12-07 DIAGNOSIS — O26.892: Primary | ICD-10-CM

## 2019-12-07 DIAGNOSIS — M25.551: ICD-10-CM

## 2019-12-07 LAB
COLOR UR: YELLOW
LEUKOCYTE ESTERASE UR QL STRIP: (no result)
MICRO URNS: (no result)
PH UR: 8 [PH] (ref 5–8.5)
SP GR UR: 1.02 (ref 1–1.03)
SQUAMOUS URNS QL MICRO: (no result) #/HPF (ref 0–5)
UROBILINOGEN UR QL: 0.2 EU/DL

## 2019-12-07 PROCEDURE — 59025 FETAL NON-STRESS TEST: CPT

## 2019-12-07 PROCEDURE — 96360 HYDRATION IV INFUSION INIT: CPT

## 2019-12-07 PROCEDURE — 96367 TX/PROPH/DG ADDL SEQ IV INF: CPT

## 2019-12-07 PROCEDURE — 80305 DRUG TEST PRSMV DIR OPT OBS: CPT

## 2019-12-07 PROCEDURE — 87086 URINE CULTURE/COLONY COUNT: CPT

## 2019-12-07 PROCEDURE — 81001 URINALYSIS AUTO W/SCOPE: CPT

## 2020-01-18 ENCOUNTER — HOSPITAL ENCOUNTER (OUTPATIENT)
Age: 25
End: 2020-01-18
Payer: COMMERCIAL

## 2020-01-18 DIAGNOSIS — Z34.90: Primary | ICD-10-CM

## 2020-01-18 LAB
GLUCOSE 1H P GLC SERPL-MCNC: 117 MG/DL (ref 74–106)
GLUCOSE P FAST SERPL-MCNC: 89 MG/DL (ref 60–105)

## 2020-01-18 PROCEDURE — 82951 GLUCOSE TOLERANCE TEST (GTT): CPT

## 2020-01-18 PROCEDURE — 36415 COLL VENOUS BLD VENIPUNCTURE: CPT

## 2020-02-07 ENCOUNTER — HOSPITAL ENCOUNTER (OUTPATIENT)
Age: 25
End: 2020-02-07
Payer: COMMERCIAL

## 2020-02-07 DIAGNOSIS — Z3A.31: ICD-10-CM

## 2020-02-07 DIAGNOSIS — Z34.90: Primary | ICD-10-CM

## 2020-02-07 LAB
ALBUMIN LEVEL: 2.9 GM/DL (ref 3.4–5)
ALBUMIN/GLOB SERPL: 0.9 {RATIO} (ref 1.1–1.8)
ALP ISO SERPL-ACNC: 132 U/L (ref 46–116)
ALT SERPLBLD-CCNC: 22 U/L (ref 12–78)
ANION GAP SERPL CALC-SCNC: 11.7 MEQ/L (ref 5–15)
AST SERPL QL: 28 U/L (ref 15–37)
BILIRUBIN,TOTAL: 0.2 MG/DL (ref 0.2–1)
BUN SERPL-MCNC: 3 MG/DL (ref 7–18)
CALCIUM SPEC-MCNC: 8.9 MG/DL (ref 8.5–10.1)
CHLORIDE SPEC-SCNC: 104 MMOL/L (ref 98–107)
CO2 SERPL-SCNC: 28 MMOL/L (ref 21–32)
CREAT BLD-SCNC: 0.53 MG/DL (ref 0.55–1.02)
ESTIMATED GLOMERULAR FILT RATE: 143 ML/MIN (ref 60–?)
GFR (AFRICAN AMERICAN): 173 ML/MIN (ref 60–?)
GLOBULIN SER CALC-MCNC: 3.4 GM/DL (ref 1.3–3.2)
GLUCOSE: 87 MG/DL (ref 74–106)
HCT VFR BLD CALC: 36 % (ref 37–47)
HGB BLD-MCNC: 11.5 G/DL (ref 12.2–16.2)
MCHC RBC-ENTMCNC: 32 G/DL (ref 31.8–35.4)
MCV RBC: 90.5 FL (ref 81–99)
MEAN CORPUSCULAR HEMOGLOBIN: 29 PG (ref 27–31.2)
PLATELET # BLD: 334 K/MM3 (ref 142–424)
POTASSIUM: 3.7 MMOL/L (ref 3.5–5.1)
PROT SERPL-MCNC: 6.3 GM/DL (ref 6.4–8.2)
RBC # BLD AUTO: 3.98 M/MM3 (ref 4.2–5.4)
SODIUM SPEC-SCNC: 140 MMOL/L (ref 136–145)
WBC # BLD AUTO: 9.8 K/MM3 (ref 4.8–10.8)

## 2020-02-07 PROCEDURE — 36415 COLL VENOUS BLD VENIPUNCTURE: CPT

## 2020-02-07 PROCEDURE — 80053 COMPREHEN METABOLIC PANEL: CPT

## 2020-02-07 PROCEDURE — 85025 COMPLETE CBC W/AUTO DIFF WBC: CPT

## 2023-10-12 ENCOUNTER — OFFICE VISIT (OUTPATIENT)
Dept: OBSTETRICS AND GYNECOLOGY | Facility: CLINIC | Age: 28
End: 2023-10-12
Payer: COMMERCIAL

## 2023-10-12 VITALS
HEIGHT: 62 IN | SYSTOLIC BLOOD PRESSURE: 124 MMHG | DIASTOLIC BLOOD PRESSURE: 80 MMHG | BODY MASS INDEX: 30.99 KG/M2 | WEIGHT: 168.4 LBS

## 2023-10-12 DIAGNOSIS — Z32.02 ENCOUNTER FOR PREGNANCY TEST, RESULT NEGATIVE: ICD-10-CM

## 2023-10-12 DIAGNOSIS — Z01.419 ENCOUNTER FOR ANNUAL ROUTINE GYNECOLOGICAL EXAMINATION: Primary | ICD-10-CM

## 2023-10-12 DIAGNOSIS — Z11.3 SCREENING FOR STD (SEXUALLY TRANSMITTED DISEASE): ICD-10-CM

## 2023-10-12 LAB
B-HCG UR QL: NEGATIVE
EXPIRATION DATE: NORMAL
INTERNAL NEGATIVE CONTROL: NEGATIVE
INTERNAL POSITIVE CONTROL: POSITIVE
Lab: NORMAL

## 2023-10-12 RX ORDER — ALBUTEROL SULFATE 90 UG/1
AEROSOL, METERED RESPIRATORY (INHALATION)
COMMUNITY
Start: 2023-08-03

## 2023-10-12 NOTE — PROGRESS NOTES
Gynecologic Annual Exam Note        Gynecologic Exam and Threatened Miscarriage        Subjective     HPI  Jamila Montana is a 28 y.o.  female who presents for annual well woman exam as a new patient. Patient reports problems with:  patient states she took 7 at home UPT's due to nausea and HA that were positive .  Her tests were positive and became more faint over time until she did receive a negative at home. She states she started spotting yesterday morning, the spotting was dark and the color of rust. She states she has also had moderate to severe cramping that sometimes feels like contractions. The cramping is constant. She has tried Tylenol for the cramping without relief. Last week she reports she had a lot of nausea and a migraine. She stopped by her PCP office and had a HCG done which came back as negative. Patient states she has had some stringy jelly like stuff with her bleeding. Her bleeding is now bright red and moderate. Patient's last menstrual period was 2023 (exact date).. Her periods occur every 25-35 days , lasting 4 days. The flow is moderate.. She reports dysmenorrhea is mild, occurring premenstrually. Partner Status: Marital Status: .  She is sexually active. She has not had new partners.. STD testing recommendations have been explained to the patient and she does desire STD testing.    Additional OB/GYN History   Current contraception: contraceptive methods: None  Desires to: do not start contraception  Thromboembolic Disease: none  Age of menarche: 13    History of STD: no    Last Pap : Per patient sometime in  Negative she is not sure if HPV testing was done  Last Completed Pap Smear       This patient has no relevant Health Maintenance data.             History of abnormal Pap smear: no  Gardasil status:uncertain if she received the vaccine  Family history of uterine, colon, breast, or ovarian cancer: no  Performs monthly Self-Breast Exam: no  Exercises  "Regularly:yes  Feelings of Anxiety or Depression: no  Tobacco Usage?: No       Current Outpatient Medications:     albuterol sulfate  (90 Base) MCG/ACT inhaler, EVERY 4-6 HOURS, Disp: , Rfl:     Prenatal Vit-Fe Fumarate-FA (PRENATAL VITAMIN 27-0.8) 27-0.8 MG tablet tablet, Take 1 tablet by mouth Daily., Disp: , Rfl:     vitamin D (ERGOCALCIFEROL) 1.25 MG (47765 UT) capsule capsule, Take 1 capsule by mouth Every 7 (Seven) Days., Disp: , Rfl:      Patient denies the need for medication refills today.    OB History          2    Para   1    Term   1       0    AB   1    Living   1         SAB   0    IAB   0    Ectopic   0    Molar   0    Multiple   0    Live Births   0                Health Maintenance   Topic Date Due    Annual Gynecologic Pelvic and Breast Exam  Never done    BMI FOLLOWUP  Never done    COVID-19 Vaccine (1) Never done    Pneumococcal Vaccine 0-64 (1 - PCV) Never done    TDAP/TD VACCINES (2 - Td or Tdap) 2017    HEPATITIS C SCREENING  Never done    ANNUAL PHYSICAL  Never done    PAP SMEAR  Never done    INFLUENZA VACCINE  Never done       Past Medical History:   Diagnosis Date    Asthma     Celiac disease 2018    Ovarian cyst         Past Surgical History:   Procedure Laterality Date    COLONOSCOPY      ENDOSCOPY      TONSILLECTOMY         The additional following portions of the patient's history were reviewed and updated as appropriate: allergies, current medications, past family history, past medical history, past social history, past surgical history, and problem list.    Review of Systems   Genitourinary:  Positive for vaginal bleeding.   All other systems reviewed and are negative.        I have reviewed and agree with the HPI, ROS, and historical information as entered above. Re Junior, APRN          Objective   /80   Ht 157.5 cm (62\")   Wt 76.4 kg (168 lb 6.4 oz)   LMP 2023 (Exact Date)   BMI 30.80 kg/mý     Physical Exam  Constitutional:      "  Appearance: Normal appearance.   Neck:      Thyroid: No thyroid mass or thyromegaly.   Pulmonary:      Effort: Pulmonary effort is normal.   Chest:      Chest wall: No mass.   Breasts:     Right: Normal. No inverted nipple, mass, nipple discharge or skin change.      Left: Normal. No inverted nipple, mass, nipple discharge or skin change.   Abdominal:      General: There is no distension.      Palpations: Abdomen is soft. There is no mass.      Tenderness: There is no abdominal tenderness.      Hernia: No hernia is present.   Genitourinary:     General: Normal vulva.      Labia:         Right: No rash.         Left: No rash.       Vagina: Normal. Bleeding present.      Cervix: No cervical motion tenderness or lesion.      Uterus: Normal.       Adnexa: Right adnexa normal and left adnexa normal.        Right: No mass or tenderness.          Left: No mass or tenderness.        Comments: Moderate bleeding  Neurological:      Mental Status: She is alert.            Assessment and Plan    Problem List Items Addressed This Visit    None  Visit Diagnoses       Encounter for annual routine gynecological examination    -  Primary    Relevant Orders    LIQUID-BASED PAP SMEAR WITH HPV GENOTYPING REGARDLESS OF INTERPRETATION ([facility])    Encounter for pregnancy test, result negative        Relevant Orders    POC Pregnancy, Urine (Completed)    Screening for STD (sexually transmitted disease)        Relevant Orders    LIQUID-BASED PAP SMEAR WITH HPV GENOTYPING REGARDLESS OF INTERPRETATION ([facility])          Reviewed HCG from her PCP that was done on 10/9 and was <2. UPT in office today was negative. We discussed this was likely early chemical pregnancy. She was trying to conceive. She is free to try again when she is ready.     GYN annual well woman exam.   Reviewed pap guidelines.   Reviewed monthly self breast exams.  Instructed to call with lumps, pain, or breast discharge.    Return in about 1 year (around  10/12/2024), or if symptoms worsen or fail to improve, for Annual physical.  Call with + UPT. Can come for HCG if concerned, definitely needs HCG if having significant pain or bleeding.       Re Junior, APRN  10/12/2023

## 2023-10-16 LAB — REF LAB TEST METHOD: NORMAL

## 2023-11-06 DIAGNOSIS — O20.0 THREATENED ABORTION: Primary | ICD-10-CM
